# Patient Record
Sex: FEMALE | Race: WHITE | NOT HISPANIC OR LATINO | Employment: FULL TIME | URBAN - METROPOLITAN AREA
[De-identification: names, ages, dates, MRNs, and addresses within clinical notes are randomized per-mention and may not be internally consistent; named-entity substitution may affect disease eponyms.]

---

## 2017-01-17 ENCOUNTER — MYC MEDICAL ADVICE (OUTPATIENT)
Dept: FAMILY MEDICINE | Facility: CLINIC | Age: 52
End: 2017-01-17

## 2017-01-17 DIAGNOSIS — E78.5 HYPERLIPIDEMIA LDL GOAL <160: Primary | ICD-10-CM

## 2017-01-17 RX ORDER — SIMVASTATIN 10 MG
10 TABLET ORAL AT BEDTIME
Qty: 30 TABLET | Refills: 0 | Status: SHIPPED | OUTPATIENT
Start: 2017-01-17 | End: 2017-02-02

## 2017-01-17 NOTE — TELEPHONE ENCOUNTER
Alex sent. Huddled with provider who would like to see patient. Ordered simvastatin x1, and order for fasting lipids.    Arnav Warren RN

## 2017-02-02 ENCOUNTER — OFFICE VISIT (OUTPATIENT)
Dept: FAMILY MEDICINE | Facility: CLINIC | Age: 52
End: 2017-02-02
Payer: COMMERCIAL

## 2017-02-02 VITALS
WEIGHT: 159 LBS | SYSTOLIC BLOOD PRESSURE: 118 MMHG | HEART RATE: 88 BPM | HEIGHT: 69 IN | BODY MASS INDEX: 23.55 KG/M2 | TEMPERATURE: 97.9 F | DIASTOLIC BLOOD PRESSURE: 82 MMHG

## 2017-02-02 DIAGNOSIS — E78.5 HYPERLIPIDEMIA LDL GOAL <160: Primary | ICD-10-CM

## 2017-02-02 DIAGNOSIS — Z11.59 NEED FOR HEPATITIS C SCREENING TEST: ICD-10-CM

## 2017-02-02 LAB
CHOLEST SERPL-MCNC: 233 MG/DL
HDLC SERPL-MCNC: 70 MG/DL
LDLC SERPL CALC-MCNC: 140 MG/DL
NONHDLC SERPL-MCNC: 163 MG/DL
TRIGL SERPL-MCNC: 114 MG/DL

## 2017-02-02 PROCEDURE — 86803 HEPATITIS C AB TEST: CPT | Performed by: PHYSICIAN ASSISTANT

## 2017-02-02 PROCEDURE — 36415 COLL VENOUS BLD VENIPUNCTURE: CPT | Performed by: PHYSICIAN ASSISTANT

## 2017-02-02 PROCEDURE — 99213 OFFICE O/P EST LOW 20 MIN: CPT | Performed by: PHYSICIAN ASSISTANT

## 2017-02-02 PROCEDURE — 80061 LIPID PANEL: CPT | Performed by: PHYSICIAN ASSISTANT

## 2017-02-02 RX ORDER — SIMVASTATIN 10 MG
10 TABLET ORAL AT BEDTIME
Qty: 90 TABLET | Refills: 3 | Status: SHIPPED | OUTPATIENT
Start: 2017-02-02 | End: 2018-02-27

## 2017-02-02 NOTE — PROGRESS NOTES
SUBJECTIVE:                                                    Isela Cartwright is a 51 year old female who presents to clinic today for the following health issues:      Hyperlipidemia Follow-Up      Rate your low fat/cholesterol diet?: good    Taking statin?  Yes, no muscle aches from statin    Other lipid medications/supplements?:  none       Amount of exercise or physical activity: 2-3 days/week for an average of 45-60 minutes    Problems taking medications regularly: No    Medication side effects: none    Diet: low fat/cholesterol        Problem list and histories reviewed & adjusted, as indicated.  Additional history: as documented    Patient Active Problem List   Diagnosis     Insomnia     Recurrent UTI     Hyperlipidemia LDL goal <160     Vaginal atrophy     Weight gain     Past Surgical History   Procedure Laterality Date     Surgical history of -        urethral dilation from UTI     Colonoscopy         Social History   Substance Use Topics     Smoking status: Former Smoker -- 0.50 packs/day for 15 years     Types: Cigarettes     Quit date: 2008     Smokeless tobacco: Former User     Alcohol Use: Yes      Comment: OCCAS     Family History   Problem Relation Age of Onset     Hypertension Father      HEART DISEASE Maternal Grandfather      cardiomegaly;  at 98     C.A.D. No family hx of      no deaths at young age, bypass, etc.     Breast Cancer Maternal Aunt      mom's half sister     Cancer - colorectal Maternal Grandmother      Breast Cancer Cousin      paternal      DIABETES No family hx of      father is borderline     Hyperlipidemia Father      CEREBROVASCULAR DISEASE Maternal Grandmother      TIAs     Breast Cancer Cousin          Patient presents with a need for a medication refill of her hyperlipidemia medication. Patient reports no concerns regarding this medication, any other medications, or about life in general.       ROS:  C: NEGATIVE for fever, chills, change in weight  E/M:  "NEGATIVE for ear, mouth and throat problems  R: NEGATIVE for significant cough or SOB  CV: NEGATIVE for chest pain, palpitations or peripheral edema  MUSCULOSKELETAL: NEGATIVE for significant arthralgias or myalgia  ROS otherwise negative    OBJECTIVE:                                                    /82 mmHg  Pulse 88  Temp(Src) 97.9  F (36.6  C) (Oral)  Ht 5' 9\" (1.753 m)  Wt 159 lb (72.122 kg)  BMI 23.47 kg/m2  LMP 07/13/2007  Body mass index is 23.47 kg/(m^2).  GENERAL: healthy, alert and no distress  EYES: Eyes grossly normal to inspection, present red-eye reflex, PERRL and conjunctivae and sclerae normal  HENT: ear canals and TM's normal, mouth and oropharynx without ulcers, lesions, erythema, or inflammation  RESP: lungs clear to auscultation - no rales, rhonchi or wheezes  CV: regular rate and rhythm, normal S1 S2, no S3 or S4, no murmur, click or rub  MSK: 2+ deep tendon reflex at the knees bilaterally  SKIN: no suspicious lesions or rashes  PSYCH: mentation appears normal, affect normal/bright         ASSESSMENT/PLAN:                                                          (E78.5) Hyperlipidemia LDL goal <160  (primary encounter diagnosis)  Comment: Patient is tolerating low dose statin treatment for previous concerns regarding hyperlipidemia. We did briefly discuss pros / cons of treatment and current guidelines - she prefers to stay on.   Plan: Lipid panel reflex to direct LDL, simvastatin         (ZOCOR) 10 MG tablet           (Z11.59) Need for hepatitis C screening test  Comment: Per guidelines, patient's age indicates the need for a one time check for subclinical Hepatitis C infection  Plan: Hepatitis C Screen Reflex to HCV RNA Quant and         Genotype         HM up to date, pap, mammo, colon     PALMER MOODY PA-C  [facility]    "

## 2017-02-02 NOTE — NURSING NOTE
"Chief Complaint   Patient presents with     Lipids       Initial /82 mmHg  Pulse 88  Temp(Src) 97.9  F (36.6  C) (Oral)  Ht 5' 9\" (1.753 m)  Wt 159 lb (72.122 kg)  BMI 23.47 kg/m2  LMP 07/13/2007 Estimated body mass index is 23.47 kg/(m^2) as calculated from the following:    Height as of this encounter: 5' 9\" (1.753 m).    Weight as of this encounter: 159 lb (72.122 kg).  BP completed using cuff size: jonathan Gates, Certified Medical Assistant (AAMA)     "

## 2017-02-02 NOTE — MR AVS SNAPSHOT
"              After Visit Summary   2/2/2017    Isela Cartwright    MRN: [id number]           Patient Information     Date Of Birth          1965        Visit Information        Provider Department      2/2/2017 7:20 AM Bandar Banuelos PA-C [facility]        Today's Diagnoses     Hyperlipidemia LDL goal <160    -  1     Need for hepatitis C screening test            Follow-ups after your visit        Who to contact     If you have questions or need follow up information about today's clinic visit or your schedule please contact [facility] directly at [phone].  Normal or non-critical lab and imaging results will be communicated to you by NoveltyLabhart, letter or phone within 4 business days after the clinic has received the results. If you do not hear from us within 7 days, please contact the clinic through NoveltyLabhart or phone. If you have a critical or abnormal lab result, we will notify you by phone as soon as possible.  Submit refill requests through NetBeez or call your pharmacy and they will forward the refill request to us. Please allow 3 business days for your refill to be completed.          Additional Information About Your Visit        MyChart Information     NetBeez gives you secure access to your electronic health record. If you see a primary care provider, you can also send messages to your care team and make appointments. If you have questions, please call your primary care clinic.  If you do not have a primary care provider, please call [phone] and they will assist you.        Care EveryWhere ID     This is your Care EveryWhere ID. This could be used by other organizations to access your [place] medical records  [id number]        Your Vitals Were     Pulse Temperature Height BMI (Body Mass Index) Last Period       88 97.9  F (36.6  C) (Oral) 5' 9\" (1.753 m) 23.47 kg/m2 07/13/2007        Blood Pressure from Last 3 Encounters:   02/02/17 118/82   08/02/16 " 104/76   10/06/15 108/60    Weight from Last 3 Encounters:   02/02/17 159 lb (72.122 kg)   09/14/16 155 lb 6.4 oz (70.489 kg)   08/02/16 154 lb 3.2 oz (69.945 kg)              We Performed the Following     Hepatitis C Screen Reflex to HCV RNA Quant and Genotype     Lipid panel reflex to direct LDL          Where to get your medicines      These medications were sent to [place] [address] [facility] [place] - [address]  [address], [place] [address]     Phone:  [phone]    - simvastatin 10 MG tablet       Primary Care Provider Office Phone # Fax #    Bandar Banuelos PA-C [phone]       [facility] [address] [place] [address]        Thank you!     Thank you for choosing [facility]  for your care. Our goal is always to provide you with excellent care. Hearing back from our patients is one way we can continue to improve our services. Please take a few minutes to complete the written survey that you may receive in the mail after your visit with us. Thank you!             Your Updated Medication List - Protect others around you: Learn how to safely use, store and throw away your medicines at www.disposemymeds.org.          This list is accurate as of: 2/2/17  8:05 AM.  Always use your most recent med list.                   Brand Name Dispense Instructions for use    CALCIUM 500 + D PO      Take 1 tablet by mouth daily.       simvastatin 10 MG tablet    ZOCOR    90 tablet    Take 1 tablet (10 mg) by mouth At Bedtime       tretinoin 0.025 % cream    RETIN-A    45 g    Use every other night, pea-size to entire face x 2 weeks, then use nightly       zolpidem 5 MG tablet    AMBIEN    30 tablet    Take 1 tablet (5 mg) by mouth nightly as needed for sleep

## 2017-02-03 LAB — HCV AB SERPL QL IA: NORMAL

## 2017-03-15 ENCOUNTER — MYC MEDICAL ADVICE (OUTPATIENT)
Dept: FAMILY MEDICINE | Facility: CLINIC | Age: 52
End: 2017-03-15

## 2017-03-15 ENCOUNTER — E-VISIT (OUTPATIENT)
Dept: FAMILY MEDICINE | Facility: CLINIC | Age: 52
End: 2017-03-15
Payer: COMMERCIAL

## 2017-03-15 DIAGNOSIS — G47.00 INSOMNIA, UNSPECIFIED TYPE: ICD-10-CM

## 2017-03-15 PROCEDURE — 99444 ZZC PHYSICIAN ONLINE EVALUATION & MANAGEMENT SERVICE: CPT | Performed by: PHYSICIAN ASSISTANT

## 2017-03-15 NOTE — TELEPHONE ENCOUNTER
It looks like this was filled by Ger[facility] in 2015. Would you like to see patient for this, or refill?    Arnav Warren RN

## 2017-03-15 NOTE — MR AVS SNAPSHOT
After Visit Summary   3/15/2017    Isela Cartwright    MRN: [id number]           Patient Information     Date Of Birth          1965        Visit Information        Provider Department      3/15/2017 10:43 AM Bandar Banuelos PA-C [facility]        Today's Diagnoses     Insomnia, unspecified type           Follow-ups after your visit        Who to contact     If you have questions or need follow up information about today's clinic visit or your schedule please contact [facility] directly at [phone].  Normal or non-critical lab and imaging results will be communicated to you by BPThart, letter or phone within 4 business days after the clinic has received the results. If you do not hear from us within 7 days, please contact the clinic through Oscart or phone. If you have a critical or abnormal lab result, we will notify you by phone as soon as possible.  Submit refill requests through Employee Benef[employer] Plans or call your pharmacy and they will forward the refill request to us. Please allow 3 business days for your refill to be completed.          Additional Information About Your Visit        MyChart Information     [employer] Benef[employer] Plans gives you secure access to your electronic health record. If you see a primary care provider, you can also send messages to your care team and make appointments. If you have questions, please call your primary care clinic.  If you do not have a primary care provider, please call [phone] and they will assist you.        Care EveryWhere ID     This is your Care EveryWhere ID. This could be used by other organizations to access your [place] medical records  [id number]        Your Vitals Were     Last Period                   07/13/2007            Blood Pressure from Last 3 Encounters:   02/02/17 118/82   08/02/16 104/76   10/06/15 108/60    Weight from Last 3 Encounters:   02/02/17 159 lb (72.1 kg)   09/14/16 155 lb 6.4 oz (70.5 kg)   08/02/16  154 lb 3.2 oz (69.9 kg)              Today, you had the following     No orders found for display         Where to get your medicines      Some of these will need a paper prescription and others can be bought over the counter.  Ask your nurse if you have questions.     Bring a paper prescription for each of these medications     zolpidem 5 MG tablet          Primary Care Provider Office Phone # Fax #    Bandar Banuelos PA-C [phone]       [facility] [address] [facility] [address]        Thank you!     Thank you for choosing [facility]  for your care. Our goal is always to provide you with excellent care. Hearing back from our patients is one way we can continue to improve our services. Please take a few minutes to complete the written survey that you may receive in the mail after your visit with us. Thank you!             Your Updated Medication List - Protect others around you: Learn how to safely use, store and throw away your medicines at www.disposemymeds.org.          This list is accurate as of: 3/15/17 11:59 PM.  Always use your most recent med list.                   Brand Name Dispense Instructions for use    CALCIUM 500 + D PO      Take 1 tablet by mouth daily.       simvastatin 10 MG tablet    ZOCOR    90 tablet    Take 1 tablet (10 mg) by mouth At Bedtime       tretinoin 0.025 % cream    RETIN-A    45 g    Use every other night, pea-size to entire face x 2 weeks, then use nightly       zolpidem 5 MG tablet    AMBIEN    30 tablet    Take 1 tablet (5 mg) by mouth nightly as needed for sleep

## 2017-03-16 RX ORDER — ZOLPIDEM TARTRATE 5 MG/1
5 TABLET ORAL
Qty: 30 TABLET | Refills: 2 | Status: SHIPPED | OUTPATIENT
Start: 2017-03-16 | End: 2018-09-20

## 2017-08-03 ENCOUNTER — RADIANT APPOINTMENT (OUTPATIENT)
Dept: MAMMOGRAPHY | Facility: CLINIC | Age: 52
End: 2017-08-03

## 2017-08-03 DIAGNOSIS — Z12.31 VISIT FOR SCREENING MAMMOGRAM: ICD-10-CM

## 2017-10-12 ENCOUNTER — MYC MEDICAL ADVICE (OUTPATIENT)
Dept: FAMILY MEDICINE | Facility: CLINIC | Age: 52
End: 2017-10-12

## 2017-10-26 ENCOUNTER — OFFICE VISIT (OUTPATIENT)
Dept: FAMILY MEDICINE | Facility: CLINIC | Age: 52
End: 2017-10-26
Payer: COMMERCIAL

## 2017-10-26 VITALS
HEIGHT: 69 IN | BODY MASS INDEX: 21.77 KG/M2 | SYSTOLIC BLOOD PRESSURE: 108 MMHG | DIASTOLIC BLOOD PRESSURE: 72 MMHG | WEIGHT: 147 LBS | TEMPERATURE: 98.3 F | HEART RATE: 80 BPM

## 2017-10-26 DIAGNOSIS — M72.2 PLANTAR FASCIITIS: Primary | ICD-10-CM

## 2017-10-26 PROCEDURE — 99213 OFFICE O/P EST LOW 20 MIN: CPT | Performed by: PHYSICIAN ASSISTANT

## 2017-10-26 NOTE — NURSING NOTE
"Chief Complaint   Patient presents with     Foot Pain     Heel pain      Flu Shot     Pt declined        Initial /72 (BP Location: Right arm, Cuff Size: Adult Regular)  Pulse 80  Temp 98.3  F (36.8  C) (Oral)  Ht 5' 9\" (1.753 m)  Wt 147 lb (66.7 kg)  LMP 07/13/2007  BMI 21.71 kg/m2 Estimated body mass index is 21.71 kg/(m^2) as calculated from the following:    Height as of this encounter: 5' 9\" (1.753 m).    Weight as of this encounter: 147 lb (66.7 kg).  Medication Reconciliation: complete     Gerri Antunez CMA (AAMA)      "

## 2017-10-26 NOTE — PROGRESS NOTES
"  SUBJECTIVE:   Isela Cartwright is a 52 year old female who presents to clinic today for the following health issues:      Joint Pain    Onset: July     Description:   Location: Left heel   Character: Dull ache    Intensity: moderate    Progression of Symptoms: same    Accompanying Signs & Symptoms:  Other symptoms: none    History:   Previous similar pain: no       Precipitating factors:   Trauma or overuse: no     Alleviating factors:  Improved by: nothing    Therapies Tried and outcome: Nothing     Patient has had pain since July, she thinks it might be due to wearing sandals in the summer. Left heel pain. No right sided pain. No history of injuries or pain in the past. Pain will increase with the first few steps after resting. Has morning pain. Pain increases with walking a lot. Typically wears tennis shows. Works as an  - but does walk around quite a bit throughout the day. Denies numbness or tingling. She has not tried anything for the pain.   Does aerobics once a week - her pain doesn't seem to increase with this activity.    Problem list and histories reviewed & adjusted, as indicated.  Additional history: as documented    BP Readings from Last 3 Encounters:   10/26/17 108/72   02/02/17 118/82   08/02/16 104/76    Wt Readings from Last 3 Encounters:   10/26/17 147 lb (66.7 kg)   02/02/17 159 lb (72.1 kg)   09/14/16 155 lb 6.4 oz (70.5 kg)         Reviewed and updated as needed this visit by clinical staff     Reviewed and updated as needed this visit by Provider       ROS:  Constitutional, HEENT, cardiovascular, pulmonary, gi and gu systems are negative, except as otherwise noted.      OBJECTIVE:   /72 (BP Location: Right arm, Cuff Size: Adult Regular)  Pulse 80  Temp 98.3  F (36.8  C) (Oral)  Ht 5' 9\" (1.753 m)  Wt 147 lb (66.7 kg)  LMP 07/13/2007  BMI 21.71 kg/m2  Body mass index is 21.71 kg/(m^2).  GENERAL: healthy, alert and no distress  MS: left plantar aspect of calcaneus " tender to palpation, ankle plantarflexion and dorsiflexion ROM WNL, strength 5/5.       ASSESSMENT/PLAN:     1. Plantar fasciitis  Patient advised to do stretching exercises every evening prior to going to bed. Also to use a cold water bottle to roll out and ice her foot at night. In addition, advised to get orthotics for extra support. Discussed the course of plantar fasciitis. Instructed to follow up in 2-3 months if not improving or worsening. May benefit from physical therapy at that time.    PALMER MOODY PA-C  [facility]

## 2017-10-26 NOTE — MR AVS SNAPSHOT
"              After Visit Summary   10/26/2017    Isela Cartwright    MRN: [id number]           Patient Information     Date Of Birth          1965        Visit Information        Provider Department      10/26/2017 8:40 AM Bandar Banuelos PA-C [facility]        Today's Diagnoses     Plantar fasciitis    -  1       Follow-ups after your visit        Who to contact     If you have questions or need follow up information about today's clinic visit or your schedule please contact [facility] directly at [phone].  Normal or non-critical lab and imaging results will be communicated to you by Cloud Lendinghart, letter or phone within 4 business days after the clinic has received the results. If you do not hear from us within 7 days, please contact the clinic through Heart Metabolicst or phone. If you have a critical or abnormal lab result, we will notify you by phone as soon as possible.  Submit refill requests through MySQL or call your pharmacy and they will forward the refill request to us. Please allow 3 business days for your refill to be completed.          Additional Information About Your Visit        MyChart Information     MySQL gives you secure access to your electronic health record. If you see a primary care provider, you can also send messages to your care team and make appointments. If you have questions, please call your primary care clinic.  If you do not have a primary care provider, please call [phone] and they will assist you.        Care EveryWhere ID     This is your Care EveryWhere ID. This could be used by other organizations to access your [place] medical records  [id number]        Your Vitals Were     Pulse Temperature Height Last Period BMI (Body Mass Index)       80 98.3  F (36.8  C) (Oral) 5' 9\" (1.753 m) 07/13/2007 21.71 kg/m2        Blood Pressure from Last 3 Encounters:   10/26/17 108/72   02/02/17 118/82   08/02/16 104/76    Weight from Last 3 " Encounters:   10/26/17 147 lb (66.7 kg)   02/02/17 159 lb (72.1 kg)   09/14/16 155 lb 6.4 oz (70.5 kg)              Today, you had the following     No orders found for display       Primary Care Provider Office Phone # Fax #    Bandar Banuelos PA-C [phone]       [address] [facility] [address]        Equal Access to Services     LINDA BELL : Hadii aad ku hadasho Soomaali, waaxda luqadaha, qaybta kaalmada adeegyada, waxay idiin hayaan adeeg kharatammy la'ani . So [facility] [phone].    ATENCIÓN: Si lornala espcarlos enrique, tiene a chamorro disposición servicios gratuitos de asistencia lingüística. [facility] [phone].    We comply with applicable federal civil rights laws and [place] laws. We do not discriminate on the basis of race, color, national origin, age, disability, sex, sexual orientation, or gender identity.            Thank you!     Thank you for choosing [facility]  for your care. Our goal is always to provide you with excellent care. Hearing back from our patients is one way we can continue to improve our services. Please take a few minutes to complete the written survey that you may receive in the mail after your visit with us. Thank you!             Your Updated Medication List - Protect others around you: Learn how to safely use, store and throw away your medicines at www.disposemymeds.org.          This list is accurate as of: 10/26/17 10:33 AM.  Always use your most recent med list.                   Brand Name Dispense Instructions for use Diagnosis    CALCIUM 500 + D PO      Take 1 tablet by mouth daily.        simvastatin 10 MG tablet    ZOCOR    90 tablet    Take 1 tablet (10 mg) by mouth At Bedtime    Hyperlipidemia LDL goal <160       tretinoin 0.025 % cream    RETIN-A    45 g    Use every other night, pea-size to entire face x 2 weeks, then use nightly    Acne vulgaris       zolpidem 5 MG tablet    AMBIEN    30 tablet    Take 1 tablet (5 mg) by mouth  nightly as needed for sleep    Insomnia, unspecified type

## 2018-01-24 ENCOUNTER — TELEPHONE (OUTPATIENT)
Dept: FAMILY MEDICINE | Facility: CLINIC | Age: 53
End: 2018-01-24

## 2018-01-24 NOTE — TELEPHONE ENCOUNTER
Reason for Call:  Other     Detailed comments: patent would like to know what her BP and cholesterol where the last time it was checked, please call with information     Phone Number Patient can be reached at: Work number on file:  [phone] (work)    Best Time: any    Can we leave a detailed message on this number? YES    Call taken on 1/24/2018 at 8:17 AM by Yulisa Sarah

## 2018-01-24 NOTE — TELEPHONE ENCOUNTER
Returned call to patient and informed her of BP reading from visit on 10/26/17, as well as most recent Lipid Panel results from 2/2/17.  She voices understanding.  Edie Mckeon RN

## 2018-02-12 ENCOUNTER — DOCUMENTATION ONLY (OUTPATIENT)
Dept: LAB | Facility: CLINIC | Age: 53
End: 2018-02-12

## 2018-02-12 DIAGNOSIS — Z13.1 SCREENING FOR DIABETES MELLITUS: ICD-10-CM

## 2018-02-12 DIAGNOSIS — Z13.220 SCREENING CHOLESTEROL LEVEL: Primary | ICD-10-CM

## 2018-02-22 DIAGNOSIS — Z13.1 SCREENING FOR DIABETES MELLITUS: ICD-10-CM

## 2018-02-22 DIAGNOSIS — Z13.220 SCREENING CHOLESTEROL LEVEL: ICD-10-CM

## 2018-02-22 LAB
ANION GAP SERPL CALCULATED.3IONS-SCNC: 7 MMOL/L (ref 3–14)
BUN SERPL-MCNC: 12 MG/DL (ref 7–30)
CALCIUM SERPL-MCNC: 9.2 MG/DL (ref 8.5–10.1)
CHLORIDE SERPL-SCNC: 107 MMOL/L (ref 94–109)
CHOLEST SERPL-MCNC: 242 MG/DL
CO2 SERPL-SCNC: 27 MMOL/L (ref 20–32)
CREAT SERPL-MCNC: 0.69 MG/DL (ref 0.52–1.04)
GFR SERPL CREATININE-BSD FRML MDRD: 88 ML/MIN/1.7M2
GLUCOSE SERPL-MCNC: 88 MG/DL (ref 70–99)
HDLC SERPL-MCNC: 70 MG/DL
LDLC SERPL CALC-MCNC: 153 MG/DL
NONHDLC SERPL-MCNC: 172 MG/DL
POTASSIUM SERPL-SCNC: 4.3 MMOL/L (ref 3.4–5.3)
SODIUM SERPL-SCNC: 141 MMOL/L (ref 133–144)
TRIGL SERPL-MCNC: 94 MG/DL

## 2018-02-22 PROCEDURE — 36415 COLL VENOUS BLD VENIPUNCTURE: CPT | Performed by: PHYSICIAN ASSISTANT

## 2018-02-22 PROCEDURE — 80048 BASIC METABOLIC PNL TOTAL CA: CPT | Performed by: PHYSICIAN ASSISTANT

## 2018-02-22 PROCEDURE — 80061 LIPID PANEL: CPT | Performed by: PHYSICIAN ASSISTANT

## 2018-02-27 ENCOUNTER — MYC MEDICAL ADVICE (OUTPATIENT)
Dept: FAMILY MEDICINE | Facility: CLINIC | Age: 53
End: 2018-02-27

## 2018-02-27 DIAGNOSIS — E78.5 HYPERLIPIDEMIA LDL GOAL <130: Primary | ICD-10-CM

## 2018-02-27 RX ORDER — SIMVASTATIN 20 MG
20 TABLET ORAL AT BEDTIME
Qty: 90 TABLET | Refills: 3 | Status: SHIPPED | OUTPATIENT
Start: 2018-02-27 | End: 2019-02-15

## 2018-04-03 ENCOUNTER — TELEPHONE (OUTPATIENT)
Dept: DERMATOLOGY | Facility: CLINIC | Age: 53
End: 2018-04-03

## 2018-04-03 ENCOUNTER — OFFICE VISIT (OUTPATIENT)
Dept: DERMATOLOGY | Facility: CLINIC | Age: 53
End: 2018-04-03
Payer: COMMERCIAL

## 2018-04-03 DIAGNOSIS — L82.0 SEBORRHEIC KERATOSES, INFLAMED: ICD-10-CM

## 2018-04-03 DIAGNOSIS — L70.0 ACNE VULGARIS: ICD-10-CM

## 2018-04-03 DIAGNOSIS — Z80.8 FAMILY HISTORY OF MELANOMA: Primary | ICD-10-CM

## 2018-04-03 RX ORDER — TRETINOIN 0.25 MG/G
CREAM TOPICAL
Qty: 45 G | Refills: 3 | Status: SHIPPED | OUTPATIENT
Start: 2018-04-03 | End: 2020-06-10

## 2018-04-03 ASSESSMENT — PAIN SCALES - GENERAL: PAINLEVEL: NO PAIN (0)

## 2018-04-03 NOTE — LETTER
4/3/2018       RE: Isela Cartwright  [address]  [facility] [address]     Dear Colleague,    Thank you for referring your patient, Isela Cartwright, to the [facility] DERMATOLOGY at [facility]. Please see a copy of my visit note below.    [facility] Dermatology Note      Dermatology Problem List:  1.Acne Vulgaris - face - tretinoin 0.025% cream nightly  2. Fhx melanoma and NMSC (mother)    CC:   Chief Complaint   Patient presents with     Skin Check     Isela is here today to have a mole looked at.          Encounter Date: Apr 3, 2018    History of Present Illness:  Ms. Isela Cartwright is a 52 year old female who presents for a spot check. She has had the spot on her left side for a long time, but lately it has changed somewhat and sometimes rubs on her bra line. It has gotten more raised she says. She does have a fhx of melanoma (mom) and her last FBSE was in September 2016 with Dr. Chavez.     Additionally, she would like a refill of her tretinoin for acne. It is working well. She uses it almost nightly. She says it helps the most on her cheeks. She still sometimes gets more acne on her chin, but it has not been bothersome enough to her at this time to change her treatment.     Past Medical History:   Patient Active Problem List   Diagnosis     Insomnia     Recurrent UTI     Hyperlipidemia LDL goal <160     Vaginal atrophy     Weight gain     Past Medical History:   Diagnosis Date     Recurrent UTI 11/19/2009     Tobacco use disorder     quit in 2008     Past Surgical History:   Procedure Laterality Date     COLONOSCOPY  2013     GENITOURINARY SURGERY  1988    burned uretha, chronic urinary tract infections before     SURGICAL HISTORY OF -       urethral dilation from UTI       Social History:  Not obtained today.    Family History:  There is a family history of melanoma and NMSC in the patient's mother.    Medications:  Current Outpatient Prescriptions    Medication Sig Dispense Refill     simvastatin (ZOCOR) 20 MG tablet Take 1 tablet (20 mg) by mouth At Bedtime 90 tablet 3     zolpidem (AMBIEN) 5 MG tablet Take 1 tablet (5 mg) by mouth nightly as needed for sleep 30 tablet 2     tretinoin (RETIN-A) 0.025 % cream Use every other night, pea-size to entire face x 2 weeks, then use nightly 45 g 3     Calcium Carbonate-Vitamin D (CALCIUM 500 + D PO) Take 1 tablet by mouth daily.       Allergies   Allergen Reactions     Amoxicillin Rash     Bees      Sulfa Drugs Rash     Tetracycline      Mild itching; not sure if this is true allergy         Review of Systems:  -Skin Establ Pt: The patient denies any new rash, pruritus, or lesions that are symptomatic, changing or bleeding, except as per HPI.  -Constitutional: The patient denies fatigue, fevers, chills, unintended weight loss, and night sweats.  -Skin: As above in HPI. No additional skin concerns.    Physical exam:  Vitals: LMP 07/13/2007  GEN: This is a well developed, well-nourished female in no acute distress, in a pleasant mood.    SKIN: Focused examination of the face, neck, abdomen and back was performed.  -There is a waxy stuck on tan to brown papule on the left flank which is inflamed.  -Face is relatively clear of comedones, although there are 2-3 small pink papules on the chin  -No other lesions of concern on areas examined.     Impression/Plan:  1. Seborrheic keratosis, irritated    Discussed benign nature of lesions.  Cryotherapy procedure note: After verbal consent and discussion of risks and benefits including but no limited to dyspigmentation/scar, blister, and pain, 1 was(were) treated with 1-2mm freeze border for 2 cycles with liquid nitrogen. Post cryotherapy instructions were provided.     2. Acne vulgaris    Tretinoin 0.025% cream refilled    Reminded patient of need for adequate sun protection - at least SPF 30    3. Family Hx of melanoma - mother    Discussed need for annual skin exams, patient's  last exam was 9/2016.     Patient to schedule for this fall.    Continue with adequate sun protection    CC Dr. Mitchell on close of this encounter.  Follow-up prn for new or changing lesions.       Staff Involved:  Staff Only  All risks, benefits and alternatives were discussed with patient.  Patient is in agreement and understands the assessment and plan.  All questions were answered.    Cora Wong PA-C  [facility]: Phone: [phone], Fax: [phone]

## 2018-04-03 NOTE — MR AVS SNAPSHOT
After Visit Summary   4/3/2018    Isela Cartwright    MRN: [id number]           Patient Information     Date Of Birth          1965        Visit Information        Provider Department      4/3/2018 10:30 AM Cora Wong PA-C [facility] Dermatology        Today's Diagnoses     Acne vulgaris          Care Instructions    Cryotherapy    What is it?    Use of a very cold liquid, such as liquid nitrogen, to freeze and destroy abnormal skin cells that need to be removed    What should I expect?    Tenderness and redness    A small blister that might grow and fill with dark purple blood. There may be crusting.    More than one treatment may be needed if the lesions do not go away.    How do I care for the treated area?    Gently wash the area with your hands when bathing.    Use a thin layer of Vaseline to help with healing. You may use a Band-Aid.     The area should heal within 7-10 days and may leave behind a pink or lighter color.     Do not use an antibiotic or Neosporin ointment.     You may take acetaminophen (Tylenol) for pain.     Call your Doctor if you have:    Severe pain    Signs of infection (warmth, redness, cloudy yellow drainage, and or a bad smell)    Questions or concerns    Who should I call with questions?       [facility]: [phone]       [facility]: [phone]       For urgent needs outside of business hours call the [facility] at [phone]        and ask for the dermatology resident on call            Follow-ups after your visit        Follow-up notes from your care team     Return in about 1 year (around 4/3/2019).      Who to contact     Please call your clinic at [phone] to:    Ask questions about your health    Make or cancel appointments    Discuss your medicines    Learn about your test results    Speak to your doctor            Additional Information About Your Visit        Alex  Information     [employer] gives you secure access to your electronic health record. If you see a primary care provider, you can also send messages to your care team and make appointments. If you have questions, please call your primary care clinic.  If you do not have a primary care provider, please call [phone] and they will assist you.      [employer] is an electronic gateway that provides easy, online access to your medical records. With [employer], you can request a clinic appointment, read your test results, renew a prescription or communicate with your care team.     To access your existing account, please contact your [facility] or call [phone] for assistance.        Care EveryWhere ID     This is your Care EveryWhere ID. This could be used by other organizations to access your [place] medical records  [id number]        Your Vitals Were     Last Period                   07/13/2007            Blood Pressure from Last 3 Encounters:   10/26/17 108/72   02/02/17 118/82   08/02/16 104/76    Weight from Last 3 Encounters:   10/26/17 66.7 kg (147 lb)   02/02/17 72.1 kg (159 lb)   09/14/16 70.5 kg (155 lb 6.4 oz)              Today, you had the following     No orders found for display         Today's Medication Changes          These changes are accurate as of 4/3/18 10:46 AM.  If you have any questions, ask your nurse or doctor.               These medicines have changed or have updated prescriptions.        Dose/Directions    tretinoin 0.025 % cream   Commonly known as:  RETIN-A   This may have changed:  additional instructions   Used for:  Acne vulgaris   Changed by:  Cora Wong PA-C        Use nightly on entire face   Quantity:  45 g   Refills:  3            Where to get your medicines      These medications were sent to [place] [address] [place] [facility] [place] - [address]  [address] [place] [address]     Phone:  [phone]     tretinoin 0.025 % cream                 Primary Care Provider Office Phone # Fax #    Bandar Banuelos PA-C [phone]       [address] [facility] [address]        Equal Access to Services     LINDA BELL : Hadii aad ku haddionicioo Soomaali, waaxda luqadaha, qaybta kaalmada adeegyada, vahid radford laTaraani hanson. So [facility] [phone].    ATENCIÓN: Si habla español, tiene a chamorro disposición servicios gratuitos de asistencia lingüística. Llame al [phone].    We comply with applicable federal civil rights laws and [place] laws. We do not discriminate on the basis of race, color, national origin, age, disability, sex, sexual orientation, or gender identity.            Thank you!     Thank you for choosing [facility] DERMATOLOGY  for your care. Our goal is always to provide you with excellent care. Hearing back from our patients is one way we can continue to improve our services. Please take a few minutes to complete the written survey that you may receive in the mail after your visit with us. Thank you!             Your Updated Medication List - Protect others around you: Learn how to safely use, store and throw away your medicines at www.disposemymeds.org.          This list is accurate as of 4/3/18 10:46 AM.  Always use your most recent med list.                   Brand Name Dispense Instructions for use Diagnosis    CALCIUM 500 + D PO      Take 1 tablet by mouth daily.        simvastatin 20 MG tablet    ZOCOR    90 tablet    Take 1 tablet (20 mg) by mouth At Bedtime    Hyperlipidemia LDL goal <130       tretinoin 0.025 % cream    RETIN-A    45 g    Use nightly on entire face    Acne vulgaris       zolpidem 5 MG tablet    AMBIEN    30 tablet    Take 1 tablet (5 mg) by mouth nightly as needed for sleep    Insomnia, unspecified type

## 2018-04-03 NOTE — PATIENT INSTRUCTIONS
Cryotherapy    What is it?    Use of a very cold liquid, such as liquid nitrogen, to freeze and destroy abnormal skin cells that need to be removed    What should I expect?    Tenderness and redness    A small blister that might grow and fill with dark purple blood. There may be crusting.    More than one treatment may be needed if the lesions do not go away.    How do I care for the treated area?    Gently wash the area with your hands when bathing.    Use a thin layer of Vaseline to help with healing. You may use a Band-Aid.     The area should heal within 7-10 days and may leave behind a pink or lighter color.     Do not use an antibiotic or Neosporin ointment.     You may take acetaminophen (Tylenol) for pain.     Call your Doctor if you have:    Severe pain    Signs of infection (warmth, redness, cloudy yellow drainage, and or a bad smell)    Questions or concerns    Who should I call with questions?       [facility]: [phone]       [facility]: [phone]       For urgent needs outside of business hours call the [facility] at [phone]        and ask for the dermatology resident on call

## 2018-04-03 NOTE — PROGRESS NOTES
[facility] Dermatology Note      Dermatology Problem List:  1.Acne Vulgaris - face - tretinoin 0.025% cream nightly  2. Fhx melanoma and NMSC (mother)    CC:   Chief Complaint   Patient presents with     Skin Check     Isela is here today to have a mole looked at.          Encounter Date: Apr 3, 2018    History of Present Illness:  Ms. Isela Cartwright is a 52 year old female who presents for a spot check. She has had the spot on her left side for a long time, but lately it has changed somewhat and sometimes rubs on her bra line. It has gotten more raised she says. She does have a fhx of melanoma (mom) and her last FBSE was in September 2016 with Dr. Chavez.     Additionally, she would like a refill of her tretinoin for acne. It is working well. She uses it almost nightly. She says it helps the most on her cheeks. She still sometimes gets more acne on her chin, but it has not been bothersome enough to her at this time to change her treatment.     Past Medical History:   Patient Active Problem List   Diagnosis     Insomnia     Recurrent UTI     Hyperlipidemia LDL goal <160     Vaginal atrophy     Weight gain     Past Medical History:   Diagnosis Date     Recurrent UTI 11/19/2009     Tobacco use disorder     quit in 2008     Past Surgical History:   Procedure Laterality Date     COLONOSCOPY  2013     GENITOURINARY SURGERY  1988    burned uretha, chronic urinary tract infections before     SURGICAL HISTORY OF -       urethral dilation from UTI       Social History:  Not obtained today.    Family History:  There is a family history of melanoma and NMSC in the patient's mother.    Medications:  Current Outpatient Prescriptions   Medication Sig Dispense Refill     simvastatin (ZOCOR) 20 MG tablet Take 1 tablet (20 mg) by mouth At Bedtime 90 tablet 3     zolpidem (AMBIEN) 5 MG tablet Take 1 tablet (5 mg) by mouth nightly as needed for sleep 30 tablet 2     tretinoin (RETIN-A) 0.025 % cream Use every other  night, pea-size to entire face x 2 weeks, then use nightly 45 g 3     Calcium Carbonate-Vitamin D (CALCIUM 500 + D PO) Take 1 tablet by mouth daily.       Allergies   Allergen Reactions     Amoxicillin Rash     Bees      Sulfa Drugs Rash     Tetracycline      Mild itching; not sure if this is true allergy         Review of Systems:  -Skin Establ Pt: The patient denies any new rash, pruritus, or lesions that are symptomatic, changing or bleeding, except as per HPI.  -Constitutional: The patient denies fatigue, fevers, chills, unintended weight loss, and night sweats.  -Skin: As above in HPI. No additional skin concerns.    Physical exam:  Vitals: LMP 07/13/2007  GEN: This is a well developed, well-nourished female in no acute distress, in a pleasant mood.    SKIN: Focused examination of the face, neck, abdomen and back was performed.  -There is a waxy stuck on tan to brown papule on the left flank which is inflamed.  -Face is relatively clear of comedones, although there are 2-3 small pink papules on the chin  -No other lesions of concern on areas examined.     Impression/Plan:  1. Seborrheic keratosis, irritated    Discussed benign nature of lesions.  Cryotherapy procedure note: After verbal consent and discussion of risks and benefits including but no limited to dyspigmentation/scar, blister, and pain, 1 was(were) treated with 1-2mm freeze border for 2 cycles with liquid nitrogen. Post cryotherapy instructions were provided.     2. Acne vulgaris    Tretinoin 0.025% cream refilled    Reminded patient of need for adequate sun protection - at least SPF 30    3. Family Hx of melanoma - mother    Discussed need for annual skin exams, patient's last exam was 9/2016.     Patient to schedule for this fall.    Continue with adequate sun protection    CC Dr. Mitchell on close of this encounter.  Follow-up prn for new or changing lesions.       Staff Involved:  Staff Only  All risks, benefits and alternatives were discussed  with patient.  Patient is in agreement and understands the assessment and plan.  All questions were answered.    Cora Wong PA-C  [facility]: Phone: [phone], Fax: [phone]

## 2018-04-04 NOTE — TELEPHONE ENCOUNTER
Prior Authorization Retail Medication Request    Medication/Dose: tretinoin (Retin-A) 0.025% cream  ICD code (if different than what is on RX):  Acne vulgaris (L70.0)  Previously Tried and Failed:  tretinoin  Rationale:  Continuation of current therapy    Insurance Name:  [employer]  Insurance ID:  [id number]

## 2018-04-05 NOTE — TELEPHONE ENCOUNTER
Central Prior Authorization Team   Phone: [phone]      PA Initiation    Medication: tretinoin (Retin-A) 0.025% cream-Initiated  Insurance Company: Other (see comments)Comment:  [phone]  Pharmacy Filling the Rx: [id number] [place] [facility] [place] - [address]  Filling Pharmacy Phone: [phone]  Filling Pharmacy Fax:    Start Date: 4/5/2018

## 2018-04-06 NOTE — TELEPHONE ENCOUNTER
Prior Authorization Approval    Authorization Effective Date: 4/3/2018  Authorization Expiration Date: 4/3/2019  Medication: tretinoin (Retin-A) 0.025% cream-PA approved  Approved Dose/Quantity:    Reference #:     Insurance Company: Other (see comments)Comment:  [employer] [phone]  Expected CoPay: $10.00     CoPay Card Available:      Foundation Assistance Needed:    Which Pharm[facility] is filling the prescription (Not needed for infusion/clinic administered[id number] [place] [address] [address]  Pharmacy Notified: Yes  Patient Notified: Yes

## 2018-05-08 ENCOUNTER — OFFICE VISIT (OUTPATIENT)
Dept: FAMILY MEDICINE | Facility: CLINIC | Age: 53
End: 2018-05-08
Payer: COMMERCIAL

## 2018-05-08 ENCOUNTER — MYC MEDICAL ADVICE (OUTPATIENT)
Dept: FAMILY MEDICINE | Facility: CLINIC | Age: 53
End: 2018-05-08

## 2018-05-08 ENCOUNTER — RADIANT APPOINTMENT (OUTPATIENT)
Dept: GENERAL RADIOLOGY | Facility: CLINIC | Age: 53
End: 2018-05-08
Attending: FAMILY MEDICINE
Payer: COMMERCIAL

## 2018-05-08 VITALS
TEMPERATURE: 98.3 F | HEIGHT: 69 IN | WEIGHT: 154 LBS | HEART RATE: 82 BPM | BODY MASS INDEX: 22.81 KG/M2 | DIASTOLIC BLOOD PRESSURE: 76 MMHG | SYSTOLIC BLOOD PRESSURE: 130 MMHG

## 2018-05-08 DIAGNOSIS — M79.645 PAIN OF FINGER OF LEFT HAND: Primary | ICD-10-CM

## 2018-05-08 PROCEDURE — 73130 X-RAY EXAM OF HAND: CPT | Mod: LT

## 2018-05-08 PROCEDURE — 99213 OFFICE O/P EST LOW 20 MIN: CPT | Performed by: FAMILY MEDICINE

## 2018-05-08 NOTE — PATIENT INSTRUCTIONS
-Ice 20 minutes 3 times daily, as needed.  -OTC medications, only as directed.  -Follow-up if worsening condition or not gradually improving over the next week.    [facility]   Discharged by : Angi Chavez CMA  Paper scripts provided to patient : no     If you have any questions regarding your visit please contact your care team:     Team Gold                Clinic Hours Telephone Number     Dr. Jeri Cardona NP 7am-7pm  Monday - Thursday   7am-5pm  Fridays  [phone]   (Appointment scheduling available 24/7)     RN Line  [phone] option 2     Urgent Care - [place] - 11am-9pm Monday-Friday Saturday-Sunday- 9am-5pm     [place] -   5pm-9pm Monday-Friday Saturday-Sunday- 9am-5pm    [phone] - [place]    [phone] - [place]       For a Price Quote for your services, please call our Consumer Price Line at [phone].     What options do I have for visits at the clinic other than the traditional office visit?     To expand how we care for you, many of our providers are utilizing electronic visits (e-visits) and telephone visits, when medically appropriate, for interactions with their patients rather than a visit in the clinic. We also offer nurse visits for many medical concerns. Just like any other service, we will bill your insurance company for this type of visit based on time spent on the phone with your provider. Not all insurance companies cover these visits. Please check with your medical insurance if this type of visit is covered. You will be responsible for any charges that are not paid by your insurance.   E-visits via [employer]: generally incur a $35.00 fee.     Telephone visits:  Time spent on the phone: *charged based on time that is spent on the phone in increments of 10 minutes. Estimated cost:   5-10 mins $30.00   11-20 mins. $59.00   21-30  mins. $85.00       Use [employer] (secure email communication and access to your chart) to send your primary care provider a message or make an appointment. Ask someone on your Team how to sign up for [employer].     As always, Thank you for trusting us with your health care needs!      [facility]:    Scheduling Appointments  [facility]  Call: [phone]    [facility]  Call: [phone]    [facility]  Call: [phone]    For Gastroenterology referrals   [facility]   Clinics and Surgery Center, 4th Floor   [address] [place] [address]   Appointments: [phone]    WHERE TO GO FOR CARE?  Clinic    Make an appointment if you:       Are sick (cold, cough, flu, sore throat, earache or in pain).       Have a small injury (sprain, small cut, burn or broken bone).       Need a physical exam, Pap smear, vaccine or prescription refill.       Have questions about your health or medicines.    To reach us:      Call [phone] ([phone]). Open 24 hours every day. (For counseling services, call [phone].)    Log into [employer] at [email or web address]. (Visit [email or web address] to create an account.) Hospital emergency room    An emergency is a serious or life- threatening problem that must be treated right away.    Call 508 or get to the hospital if you have:      Very bad or sudden:            - Chest pain or pressure         - Bleeding         - Head or belly pain         - Dizziness or trouble seeing, walking or                          Speaking      Problems breathing      Blood in your vomit or you are coughing up blood      A major injury (knocked out, loss of a finger or limb, rape, broken bone protruding from skin)    A mental health crisis. (Or call the Mental Health Crisis line at 1-737.284.2600 or Suicide Prevention Hotline at 1-804.941.1455.)    Open 24 hours every day. You don't  need an appointment.     Urgent care    Visit urgent care for sickness or small injuries when the clinic is closed. You don't need an appointment. To check hours or find an urgent care near you, visit [email or web address]. Online care    Get online care from [facility] for more than 70 common problems, like colds, allergies and infections. Open 24 hours every day at:   [email or web address]   Need help deciding?    For advice about where to be seen, you may call your clinic and ask to speak with a nurse. We're here for you 24 hours every day.         If you are deaf or hard of hearing, please let us know. We provide many free services including sign language interpreters, oral interpreters, TTYs, telephone amplifiers, note takers and written materials.

## 2018-05-08 NOTE — PROGRESS NOTES
SUBJECTIVE:   Isela Cartwright is a 52 year old female who presents to clinic today for the following health issues:    Chief Complaint   Patient presents with     Musculoskeletal Problem     left fingers injury happened last night      Finger Pain    Onset:     Occurred last evening.      Mechanism:  Patient states she missed the last step of her deck.  Patient fell, catching herself with the fingers of her left hand.    No other injuries reported.  No headache or loss of consciousness.    Description:   Location: PIP joints of the left index, middle, and ring fingers  Character: Dull ache, especially involving the middle finger     Intensity: Moderate - 3/10 (rest) and 5-6/10 (with typing)    Progression of Symptoms: Same    Accompanying Signs & Symptoms:  Other symptoms: Swelling and purplish discoloration of the affected PIP joints noted, mainly involving the middle finger.  No paresthesias or weakness.    History:   Previous similar pain: no       Precipitating factors:   Trauma or overuse: YES - Typing at work makes it worse.    Alleviating factors:  Improved by: nothing    Therapies Tried and outcome: Ice last night, Ibuprofen     Problem list and histories reviewed & adjusted, as indicated.  Additional history: as documented    Patient Active Problem List   Diagnosis     Insomnia     Recurrent UTI     Hyperlipidemia LDL goal <160     Vaginal atrophy     Weight gain     Pain of finger of left hand     Past Surgical History:   Procedure Laterality Date     COLONOSCOPY  2013     GENITOURINARY SURGERY  1988    burned uretha, chronic urinary tract infections before     SURGICAL HISTORY OF -       urethral dilation from UTI       Social History   Substance Use Topics     Smoking status: Former Smoker     Packs/day: 0.50     Years: 15.00     Types: Cigarettes     Quit date: 1/6/2008     Smokeless tobacco: Former User     Alcohol use Yes      Comment: OCCAS     Family History   Problem Relation Age of Onset      "Hypertension Father      Hyperlipidemia Father      Cancer - colorectal Maternal Grandmother      CEREBROVASCULAR DISEASE Maternal Grandmother      TIAs     HEART DISEASE Maternal Grandfather      cardiomegaly;  at 98     Breast Cancer Maternal Aunt      mom's half sister     Breast Cancer Cousin      paternal      Breast Cancer Cousin      C.A.D. No family hx of      no deaths at young age, bypass, etc.     DIABETES No family hx of      father is borderline           Reviewed and updated as needed this visit by clinical staff  Tobacco  Allergies  Meds  Med Hx  Surg Hx  Fam Hx  Soc Hx      Reviewed and updated as needed this visit by Provider         ROS:  Patient denies risk for pregnancy, stating that she has been in menopause the past 15 years.    OBJECTIVE:     /76  Pulse 82  Temp 98.3  F (36.8  C) (Oral)  Ht 5' 9\" (1.753 m)  Wt 154 lb (69.9 kg)  LMP 2007  Breastfeeding? No  BMI 22.74 kg/m2  Body mass index is 22.74 kg/(m^2).    General: Awake, alert, and in no acute distress.  HEENT: Sclera anicteric.  No conjunctivitis.  Respiratory:  No respiratory distress.   Left Hand: No gross abnormalities.  Full range of motion and intact strength noted.  There is mild edema, ecchymosis, and tenderness noted involving the PIP joints of the index, middle, and ring fingers.  The PIP joint of the middle finger is most affected.  The remainder of the fingers and left hand are nontender to palpation.  Sensation intact.  Capillary refill less than 3 seconds.    X-ray of the Left Hand: Negative for acute changes or fracture, as reviewed by this examiner.  Formal Radiology report was reviewed in Epic.    ASSESSMENT/PLAN:     1. Pain of fingers of the left hand, likely consistent with contusions of the PIP joints of the index, middle, and ring fingers.  X-ray studies today were negative for fracture or dislocation.    - XR Hand Left G/E 3 Views    -Ice 20 minutes 3 times daily, as needed.  -OTC " medications, only as directed.  -Follow-up if worsening condition or not gradually improving over the next week.    Leesa Aguirre MD  [facility]

## 2018-05-08 NOTE — MR AVS SNAPSHOT
After Visit Summary   5/8/2018    Isela Cartwright    MRN: [id number]           Patient Information     Date Of Birth          1965        Visit Information        Provider Department      5/8/2018 1:40 PM Leesa Aguirre MD [facility] [place] [facility]        Today's Diagnoses     Pain of finger of left hand    -  1      Care Instructions    [facility]   Discharged by : Angi Chavez CMA  Paper scripts provided to patient : no     If you have any questions regarding your visit please contact your care team:     Team Gold                Clinic Hours Telephone Number     Dr. Jeri Cardona NP 7am-7pm  Monday - Thursday   7am-5pm  Fridays  [phone]   (Appointment scheduling available 24/7)     RN Line  [phone] option 2     Urgent Care - [place] - 11am-9pm Monday-Friday Saturday-Sunday- 9am-5pm     [place] -   5pm-9pm Monday-Friday Saturday-Sunday- 9am-5pm    [phone] - [place]    [phone] - [place]       For a Price Quote for your services, please call our Consumer Price Line at [phone].     What options do I have for visits at the clinic other than the traditional office visit?     To expand how we care for you, many of our providers are utilizing electronic visits (e-visits) and telephone visits, when medically appropriate, for interactions with their patients rather than a visit in the clinic. We also offer nurse visits for many medical concerns. Just like any other service, we will bill your insurance company for this type of visit based on time spent on the phone with your provider. Not all insurance companies cover these visits. Please check with your medical insurance if this type of visit is covered. You will be responsible for any charges that are not paid by your insurance.   E-visits via PowerOasis:  generally incur a $35.00 fee.     Telephone visits:  Time spent on the phone: *charged based on time that is spent on the phone in increments of 10 minutes. Estimated cost:   5-10 mins $30.00   11-20 mins. $59.00   21-30 mins. $85.00       Use [employer] (secure email communication and access to your chart) to send your primary care provider a message or make an appointment. Ask someone on your Team how to sign up for [employer].     As always, Thank you for trusting us with your health care needs!      [facility]:    Scheduling Appointments  [facility]  Call: [phone]    [facility]  Call: [phone]    [facility]  Call: [phone]    For Gastroenterology referrals   [facility]in[facility] and Surgery Center, 4th Floor   [address] [place] [address]   Appointments: [phone]    WHERE TO GO FOR CARE?  Clinic    Make an appointment if you:       Are sick (cold, cough, flu, sore throat, earache or in pain).       Have a small injury (sprain, small cut, burn or broken bone).       Need a physical exam, Pap smear, vaccine or prescription refill.       Have questions about your health or medicines.    To reach us:      Call [phone] ([phone]). Open 24 hours every day. (For counseling services, call [phone].)    Log into Specle at [email or web address]. (Visit [email or web address] to create an account.) Hospital emergency room    An emergency is a serious or life- threatening problem that must be treated right away.    Call 879 or get to the hospital if you have:      Very bad or sudden:            - Chest pain or pressure         - Bleeding         - Head or belly pain         - Dizziness or trouble seeing, walking or                          Speaking      Problems breathing      Blood in your vomit or you are coughing up blood      A major injury (knocked out, loss of a finger  or limb, rape, broken bone protruding from skin)    A mental health crisis. (Or call the Mental Health Crisis line at 1-317.630.4556 or Suicide Prevention Hotline at 1-993.939.2379.)    Open 24 hours every day. You don't need an appointment.     Urgent care    Visit urgent care for sickness or small injuries when the clinic is closed. You don't need an appointment. To check hours or find an urgent care near you, visit [email or web address]. Online care    Get online care from [facility] for more than 70 common problems, like colds, allergies and infections. Open 24 hours every day at:   [email or web address]   Need help deciding?    For advice about where to be seen, you may call your clinic and ask to speak with a nurse. We're here for you 24 hours every day.         If you are deaf or hard of hearing, please let us know. We provide many free services including sign language interpreters, oral interpreters, TTYs, telephone amplifiers, note takers and written materials.                   Follow-ups after your visit        Who to contact     If you have questions or need follow up information about today's clinic visit or your schedule please contact [facility] directly at [phone].  Normal or non-critical lab and imaging results will be communicated to you by SendUshart, letter or phone within 4 business days after the clinic has received the results. If you do not hear from us within 7 days, please contact the clinic through SendUshart or phone. If you have a critical or abnormal lab result, we will notify you by phone as soon as possible.  Submit refill requests through [employer] or call your pharmacy and they will forward the refill request to us. Please allow 3 business days for your refill to be completed.          Additional Information About Your Visit        [employer] Information     [employer] gives you secure access to your electronic health record. If you see a primary care provider, you can also send messages  "to your care team and make appointments. If you have questions, please call your primary care clinic.  If you do not have a primary care provider, please call [phone] and they will assist you.        Care EveryWhere ID     This is your Care EveryWhere ID. This could be used by other organizations to access your [place] medical records  [id number]        Your Vitals Were     Pulse Temperature Height Last Period Breastfeeding? BMI (Body Mass Index)    82 98.3  F (36.8  C) (Oral) 5' 9\" (1.753 m) 07/13/2007 No 22.74 kg/m2       Blood Pressure from Last 3 Encounters:   05/08/18 130/76   10/26/17 108/72   02/02/17 118/82    Weight from Last 3 Encounters:   05/08/18 154 lb (69.9 kg)   10/26/17 147 lb (66.7 kg)   02/02/17 159 lb (72.1 kg)              We Performed the Following     XR Hand Left G/E 3 Views        Primary Care Provider Office Phone # Fax #    Bandar Banuelos PA-C [phone]       [address] [facility] [address]        Equal Access to Services     [facility] ARABELLA : Hadii aad ku hadasho Soomaali, waaxda luqadaha, qaybta kaalmada adeegyada, waxay kourtneyin hayyingn goyoeg prabhakar lalana ah. So [facility] [phone].    ATENCIÓN: Si habla español, tiene a chamorro disposición servicios gratuitos de asistencia lingüística. Randal al [phone].    We comply with applicable federal civil rights laws and [place] laws. We do not discriminate on the basis of race, color, national origin, age, disability, sex, sexual orientation, or gender identity.            Thank you!     Thank you for choosing [facility]  for your care. Our goal is always to provide you with excellent care. Hearing back from our patients is one way we can continue to improve our services. Please take a few minutes to complete the written survey that you may receive in the mail after your visit with us. Thank you!             Your Updated Medication List - Protect others around you: Learn how to safely use, " store and throw away your medicines at www.disposemymeds.org.          This list is accurate as of 5/8/18  2:37 PM.  Always use your most recent med list.                   Brand Name Dispense Instructions for use Diagnosis    CALCIUM 500 + D PO      Take 1 tablet by mouth daily.        simvastatin 20 MG tablet    ZOCOR    90 tablet    Take 1 tablet (20 mg) by mouth At Bedtime    Hyperlipidemia LDL goal <130       tretinoin 0.025 % cream    RETIN-A    45 g    Use nightly on entire face    Acne vulgaris       zolpidem 5 MG tablet    AMBIEN    30 tablet    Take 1 tablet (5 mg) by mouth nightly as needed for sleep    Insomnia, unspecified type

## 2018-05-09 PROBLEM — M79.645 PAIN OF FINGER OF LEFT HAND: Status: ACTIVE | Noted: 2018-05-09

## 2018-06-19 ENCOUNTER — OFFICE VISIT (OUTPATIENT)
Dept: FAMILY MEDICINE | Facility: CLINIC | Age: 53
End: 2018-06-19
Payer: COMMERCIAL

## 2018-06-19 VITALS
BODY MASS INDEX: 21.77 KG/M2 | WEIGHT: 147 LBS | TEMPERATURE: 98 F | HEART RATE: 60 BPM | HEIGHT: 69 IN | DIASTOLIC BLOOD PRESSURE: 62 MMHG | SYSTOLIC BLOOD PRESSURE: 118 MMHG

## 2018-06-19 DIAGNOSIS — M79.642 PAIN OF LEFT HAND: Primary | ICD-10-CM

## 2018-06-19 PROCEDURE — 99213 OFFICE O/P EST LOW 20 MIN: CPT | Performed by: PHYSICIAN ASSISTANT

## 2018-06-19 NOTE — PROGRESS NOTES
"  SUBJECTIVE:   Isela Cartwright is a 53 year old female who presents to clinic today for the following health issues:    Hand pain - Follow up from appointment on 5/8/18, pain has not improved. Fell off a deck and landed on it, hyperextended. Notes swelling, stiffness and pain since. No weakness. X rays from 5/8 normal.     Patient Active Problem List   Diagnosis     Insomnia     Recurrent UTI     Hyperlipidemia LDL goal <160     Vaginal atrophy     Weight gain     Pain of finger of left hand      Current Outpatient Prescriptions   Medication     Calcium Carbonate-Vitamin D (CALCIUM 500 + D PO)     diclofenac (VOLTAREN) 1 % GEL topical gel     simvastatin (ZOCOR) 20 MG tablet     tretinoin (RETIN-A) 0.025 % cream     zolpidem (AMBIEN) 5 MG tablet     No current facility-administered medications for this visit.       Problem list and histories reviewed & adjusted, as indicated.  Additional history: as documented    Labs reviewed in EPIC    Reviewed and updated as needed this visit by clinical staff       Reviewed and updated as needed this visit by Provider         ROS:  Constitutional, HEENT, cardiovascular, pulmonary, gi and gu systems are negative, except as otherwise noted.    OBJECTIVE:     /62 (Cuff Size: Adult Regular)  Pulse 60  Temp 98  F (36.7  C) (Oral)  Ht 5' 9\" (1.753 m)  Wt 147 lb (66.7 kg)  LMP 07/13/2007  BMI 21.71 kg/m2  Body mass index is 21.71 kg/(m^2).  GENERAL: healthy, alert and no distress  MUSC: Left hand tender 2nd, 3rd digit of the PIP is erythematous and stiff, ROM is stiff but mostly intact. Normal exam otherwise     ASSESSMENT/PLAN:         ICD-10-CM    1. Pain of left hand M79.642 [facility] PT, HAND, AND CHIROPRACTIC REFERRAL     diclofenac (VOLTAREN) 1 % GEL topical gel      Persistent. Recommend PT and topical NSAID. Follow up as needed.    CECIL Castorena, PA-C   "

## 2018-06-19 NOTE — MR AVS SNAPSHOT
After Visit Summary   6/19/2018    Isela Cartwright    MRN: [id number]           Patient Information     Date Of Birth          1965        Visit Information        Provider Department      6/19/2018 12:40 PM Bandar Banuelos PA-C [facility]        Today's Diagnoses     Pain of left hand    -  1       Follow-ups after your visit        Additional Services     [facility] PT, HAND, AND CHIROPRACTIC REFERRAL       **This order will print in the [facility] Scheduling Office**    Physical Therapy, Hand Therapy and Chiropractic Care are available through:    *[facility]  *[facility]  *[facility]    Call one number to schedule at any of the above locations: [phone].    Your provider has referred you to: Hand Therapy    Indication/Reason for Referral: Left hand swelling following a fall - persisting for 5-6 weeks   Onset of Illness: 6+ weeks   Therapy Orders: Evaluate and Treat  Special Programs: None  Special Request: None    Shekhar Corrigan      Additional Comments for the Therapist or Chiropractor: Thanks     Please be aware that coverage of these services is subject to the terms and limitations of your health insurance plan.  Call member services at your health plan with any benefit or coverage questions.      Please bring the following to your appointment:    *Your personal calendar for scheduling future appointments  *Comfortable clothing                  Your next 10 appointments already scheduled     Aug 06, 2018  7:30 AM CDT   Screening Mammogram with [id number]   [facility] ([facility])    [address] [place] [id number] [phone]           Do NOT use body powder, lotions, perfume or deodorant the day of the exam.  If your last mammogram was not done at [place] [facility], please bring your mammogram films. We will need the name of your provider to send a copy of  "your report.  A mammogram may be covered on an annual or biannual basis, please check with your insurance company.              Who to contact     If you have questions or need follow up information about today's clinic visit or your schedule please contact [facility] directly at [phone].  Normal or non-critical lab and imaging results will be communicated to you by MyChart, letter or phone within 4 business days after the clinic has received the results. If you do not hear from us within 7 days, please contact the clinic through Btargethart or phone. If you have a critical or abnormal lab result, we will notify you by phone as soon as possible.  Submit refill requests through [employer] or call your pharmacy and they will forward the refill request to us. Please allow 3 business days for your refill to be completed.          Additional Information About Your Visit        MyChart Information     [employer] gives you secure access to your electronic health record. If you see a primary care provider, you can also send messages to your care team and make appointments. If you have questions, please call your primary care clinic.  If you do not have a primary care provider, please call [phone] and they will assist you.        Care EveryWhere ID     This is your Care EveryWhere ID. This could be used by other organizations to access your [place] medical records  [id number]        Your Vitals Were     Pulse Temperature Height Last Period BMI (Body Mass Index)       60 98  F (36.7  C) (Oral) 5' 9\" (1.753 m) 07/13/2007 21.71 kg/m2        Blood Pressure from Last 3 Encounters:   06/19/18 118/62   05/08/18 130/76   10/26/17 108/72    Weight from Last 3 Encounters:   06/19/18 147 lb (66.7 kg)   05/08/18 154 lb (69.9 kg)   10/26/17 147 lb (66.7 kg)              We Performed the Following     [facility] PT, HAND, AND CHIROPRACTIC REFERRAL          Today's Medication Changes          These changes are accurate as of " 6/19/18  1:00 PM.  If you have any questions, ask your nurse or doctor.               Start taking these medicines.        Dose/Directions    diclofenac 1 % Gel topical gel   Commonly known as:  VOLTAREN   Used for:  Pain of left hand   Started by:  Bandar Banuelos PA-C        Apply 4 grams to knees or 2 grams to hands four times daily using enclosed dosing card.   Quantity:  100 g   Refills:  1            Where to get your medicines      These medications were sent to [place] [address] [facility] - [place], [place] - [address]  [address], [place] [address]     Phone:  [phone]     diclofenac 1 % Gel topical gel                Primary Care Provider Office Phone # Fax #    Bandar Banuelos PA-C [phone]       [facility] [address]        Equal Access to Services     [facility] : Hadii aad ku hadasho Soomaali, waaxda luqadaha, qaybta kaalmada adeegyada, vahid oconnellin hayaan rob castro . So [facility] [phone].    ATENCIÓN: Si habla español, tiene a chamorro disposición servicios gratuitos de asistencia lingüística. Llame al [phone].    We comply with applicable federal civil rights laws and [place] laws. We do not discriminate on the basis of race, color, national origin, age, disability, sex, sexual orientation, or gender identity.            Thank you!     Thank you for choosing [facility]  for your care. Our goal is always to provide you with excellent care. Hearing back from our patients is one way we can continue to improve our services. Please take a few minutes to complete the written survey that you may receive in the mail after your visit with us. Thank you!             Your Updated Medication List - Protect others around you: Learn how to safely use, store and throw away your medicines at www.disposemymeds.org.          This list is accurate as of 6/19/18  1:00 PM.  Always use your most recent med list.                   Brand Name  Dispense Instructions for use Diagnosis    CALCIUM 500 + D PO      Take 1 tablet by mouth daily.        diclofenac 1 % Gel topical gel    VOLTAREN    100 g    Apply 4 grams to knees or 2 grams to hands four times daily using enclosed dosing card.    Pain of left hand       simvastatin 20 MG tablet    ZOCOR    90 tablet    Take 1 tablet (20 mg) by mouth At Bedtime    Hyperlipidemia LDL goal <130       tretinoin 0.025 % cream    RETIN-A    45 g    Use nightly on entire face    Acne vulgaris       zolpidem 5 MG tablet    AMBIEN    30 tablet    Take 1 tablet (5 mg) by mouth nightly as needed for sleep    Insomnia, unspecified type

## 2018-07-02 ENCOUNTER — THERAPY VISIT (OUTPATIENT)
Dept: OCCUPATIONAL THERAPY | Facility: CLINIC | Age: 53
End: 2018-07-02
Payer: COMMERCIAL

## 2018-07-02 DIAGNOSIS — M25.442 SWELLING OF HAND JOINT, LEFT: ICD-10-CM

## 2018-07-02 DIAGNOSIS — M79.642 PAIN OF LEFT HAND: ICD-10-CM

## 2018-07-02 PROCEDURE — 97165 OT EVAL LOW COMPLEX 30 MIN: CPT | Mod: GO | Performed by: OCCUPATIONAL THERAPIST

## 2018-07-02 PROCEDURE — 97140 MANUAL THERAPY 1/> REGIONS: CPT | Mod: GO | Performed by: OCCUPATIONAL THERAPIST

## 2018-07-02 PROCEDURE — 97110 THERAPEUTIC EXERCISES: CPT | Mod: GO | Performed by: OCCUPATIONAL THERAPIST

## 2018-07-02 NOTE — PROGRESS NOTES
Hand Therapy Initial Evaluation    Current Date: July, 2, 2018  Order Dr. Bandar Banuelos, Mitch and treat Left hand pain  Diagnosis: Left hand pain swelling and stiffness post fall  Procedure:  none  Post:  7w 6d        Subjective:  Isela Cartwright is a 53 year old female with a diagnosis of Left hand pain.   Right hand dominate  Patient reports symptoms of pain, stiffness/loss of motion, weakness/loss of strength and edema of the left index, middle, and ring fingers which occurred due to fall on outstretched hand. Since onset symptoms are Unchanged  Special tests:  x-ray.  Previous treatment: Pt home icing.    General health as reported by patient is excellent.  Pertinent medical history includes:Menopausal, Migraines/Headaches, Smoking  Medical allergies:Amoxocillion, Sulpha drugs, tetracycline.  Surgical history: none.  Medication history: High colesterol.    Occupational Profile Information:  Current occupation is   Currently working in normal job without restrictions  Job Tasks: Repetitive Tasks  Prior functional level:  no limitations  Barriers include:none  Mobility: No difficulty  Transportation: drives  Leisure activities/hobbies: garden and read  Other: mild restriction to job tasks, notes endrange stiffness and joint enlargement    Functional Outcome Measure:   Upper Extremity Functonal Index 68/80 mild impairment , sensitive to vibration.     Objective:  Pain Level Report  VAS(0-10) 7/2/2018   At Rest: minimal   With Use: 3/10     Report of Pain:  Location:  index finger, long finger and ring finger  Pain Quality:  Aching  Frequency: constant  day  Pain is worst:  daytime  Exacerbated by:   and repetitive work  Relieved by:  Maybe icing hard to say  Progression:  Unchanged   Edema:  MILD joint enlargement with nodule formation on radial side of PIP jt.   Sensation: WNL throughout all nerve distributions; per patient report    ROM:  Endrange limitation due to stiffness  DPC index 8 hours  cm, middle .6cm Ring and small full      Strength:  Strength:   (Measured in pounds)  Pain Report:  - none    + mild    ++ moderate    +++ severe    Right Left   Trials 3 3   1  2  3 75  73  70 45  50  50   Average: 73 48    mild discomfort with release of      Assessment:  Patient presents with symptoms consistent with diagnosis of hand hyper extension,  with conservative intervention.     Patient's limitations or Problem List includes:  Pain, Decreased ROM/motion, Increased edema, Weakness and Decreased  of the left hand which interferes with the patient's ability to perform Household Chores as compared to previous level of function.    Rehab Potential:  Excellent - Return to full activity, no limitations    Patient will benefit from skilled Occupational Therapy to decrease pain, edema and adherence of scarring to return to previous activity level and resume normal daily tasks and to reach their rehab potential.    Barriers to Learning:  No barrier    Communication Issues:  Patient appears to be able to clearly communicate and understand verbal and written communication and follow directions correctly.    Chart Review: Chart Review    Identified Performance Deficits: home establishment and management    Assessment of Occupational Performance:  1-3 Performance Deficits    Clinical Decision Making (Complexity): Low complexity    Treatment Explanation:  The following has been discussed with the patient:  RX ordered/plan of care  Anticipated outcomes  Possible risks and side effects    Plan:  Frequency:  1 X week, once daily  Duration:  for 1 months    Treatment Plan:   Modalities:  Heat ( warm water bath for exercise)  Therapeutic Exercise:  AROM, AAROM, PROM and Tendon Gliding  Manual Techniques:  Manual edema mobilization  Self Care:  Self Care Tasks  Discharge Plan:  Achieve all LTG.  Independent in home treatment program.  Reach maximal therapeutic benefit.    Home Exercise Program:  Hard copy  handouts: AROM tendon gliding exercises   MEM palm (webspaces) and dorsal fingers  Coban tape night sleeves  Kinesiotape dorsal hand and webspaces after tx.      Next Visit:  Recheck 1 week, reassess self management, progress to strengthening if swelling is resolving.

## 2018-07-02 NOTE — MR AVS SNAPSHOT
After Visit Summary   7/2/2018    Isela Cartwright    MRN: [id number]           Patient Information     Date Of Birth          1965        Visit Information        Provider Department      7/2/2018 7:30 AM Kaylan Humphrey [facility] [place]        Today's Diagnoses     Pain of left hand        Swelling of hand joint, left           Follow-ups after your visit        Your next 10 appointments already scheduled     Jul 10, 2018  8:00 AM CDT   SCAR Hand with Gissell Skelton   [facility] [place] ([facility] [place] [facility])    [address]  [place] [address] [phone]            Aug 06, 2018  7:30 AM CDT   Screening Mammogram with UCB[address] [facility] ([facility] [place])    [address] [place] [address], 2nd Floor  [facility] [address] [phone]           Do NOT use body powder, lotions, perfume or deodorant the day of the exam.  If your last mammogram was not done at [place], please bring your mammogram films. We will need the name of your provider to send a copy of your report.  A mammogram may be covered on an annual or biannual basis, please check with your insurance company.              Who to contact     If you have questions or need follow up information about today's clinic visit or your schedule please contact [facility] [place] directly at [phone].  Normal or non-critical lab and imaging results will be communicated to you by MyChart, letter or phone within 4 business days after the clinic has received the results. If you do not hear from us within 7 days, please contact the clinic through MyChart or phone. If you have a critical or abnormal lab result, we will notify you by phone as soon as possible.  Submit refill requests through SkyJam or call your pharmacy and they will forward the refill request to us. Please allow 3  business days for your refill to be completed.          Additional Information About Your Visit        MyChart Information     [email or web address] gives you secure access to your electronic health record. If you see a primary care provider, you can also send messages to your care team and make appointments. If you have questions, please call your primary care clinic.  If you do not have a primary care provider, please call [phone] and they will assist you.        Care EveryWhere ID     This is your Care EveryWhere ID. This could be used by other organizations to access your [place] medical records  [id number]        Your Vitals Were     Last Period                   07/13/2007            Blood Pressure from Last 3 Encounters:   06/19/18 118/62   05/08/18 130/76   10/26/17 108/72    Weight from Last 3 Encounters:   06/19/18 66.7 kg (147 lb)   05/08/18 69.9 kg (154 lb)   10/26/17 66.7 kg (147 lb)              We Performed the Following     INITIAL EVAL REPORT     MANUAL THER TECH     OT Eval, Low Complexity ([address])     THERAPEUTIC EXERCISES        Primary Care Provider Office Phone # Fax #    Bandar Banuelos PA-C [phone]       [address] [facility] [address]        Equal Access to Services     LINDA BELL : Hadii aad ku hadasho Soomaali, waaxda luqadaha, qaybta kaalmada adeegyada, vahid marshn rob hanson. So [facility] [phone].    ATENCIÓN: Si habla español, tiene a chamorro disposición servicios gratuitos de asistencia lingüística. Llame al [phone].    We comply with applicable federal civil rights laws and [place] laws. We do not discriminate on the basis of race, color, national origin, age, disability, sex, sexual orientation, or gender identity.            Thank you!     Thank you for choosing [facility] [place]  for your care. Our goal is always to provide you with excellent care. Hearing back from our patients is one way we can  continue to improve our services. Please take a few minutes to complete the written survey that you may receive in the mail after your visit with us. Thank you!             Your Updated Medication List - Protect others around you: Learn how to safely use, store and throw away your medicines at www.disposemymeds.org.          This list is accurate as of 7/2/18 11:51 AM.  Always use your most recent med list.                   Brand Name Dispense Instructions for use Diagnosis    CALCIUM 500 + D PO      Take 1 tablet by mouth daily.        diclofenac 1 % Gel topical gel    VOLTAREN    100 g    Apply 4 grams to knees or 2 grams to hands four times daily using enclosed dosing card.    Pain of left hand       simvastatin 20 MG tablet    ZOCOR    90 tablet    Take 1 tablet (20 mg) by mouth At Bedtime    Hyperlipidemia LDL goal <130       tretinoin 0.025 % cream    RETIN-A    45 g    Use nightly on entire face    Acne vulgaris       zolpidem 5 MG tablet    AMBIEN    30 tablet    Take 1 tablet (5 mg) by mouth nightly as needed for sleep    Insomnia, unspecified type

## 2018-07-10 ENCOUNTER — THERAPY VISIT (OUTPATIENT)
Dept: OCCUPATIONAL THERAPY | Facility: CLINIC | Age: 53
End: 2018-07-10
Payer: COMMERCIAL

## 2018-07-10 DIAGNOSIS — M79.642 PAIN OF LEFT HAND: ICD-10-CM

## 2018-07-10 DIAGNOSIS — M25.442 SWELLING OF HAND JOINT, LEFT: ICD-10-CM

## 2018-07-10 PROCEDURE — 97110 THERAPEUTIC EXERCISES: CPT | Mod: GO | Performed by: OCCUPATIONAL THERAPIST

## 2018-07-10 NOTE — MR AVS SNAPSHOT
After Visit Summary   7/10/2018    Isela Cartwright    MRN: [id number]           Patient Information     Date Of Birth          1965        Visit Information        Provider Department      7/10/2018 8:00 AM Gissell Skelton [facility]note 3[facility] [place]        Today's Diagnoses     Pain of left hand        Swelling of hand joint, left           Follow-ups after your visit        Your next 10 appointments already scheduled     Jul 24, 2018  7:30 AM CDT   SCAR Hand with Gissell Skelton   [facility]note 3[facility] [place] ([facility] [place] [facility])    [address]  [place] [address] [phone]            Aug 06, 2018  7:30 AM CDT   Screening Mammogram with UCB[address] [facility] ([facility] [place])    [address], [address]nd [address]  [facility] [address] [phone]           Do NOT use body powder, lotions, perfume or deodorant the day of the exam.  If your last mammogram was not done at [place], please bring your mammogram films. We will need the name of your provider to send a copy of your report.  A mammogram may be covered on an annual or biannual basis, please check with your insurance company.              Who to contact     If you have questions or need follow up information about today's clinic visit or your schedule please contact [facility] [place] directly at [phone].  Normal or non-critical lab and imaging results will be communicated to you by MyChart, letter or phone within 4 business days after the clinic has received the results. If you do not hear from us within 7 days, please contact the clinic through MyChart or phone. If you have a critical or abnormal lab result, we will notify you by phone as soon as possible.  Submit refill requests through Higher One or call your pharmacy and they will forward the refill request to us. Please allow 3  business days for your refill to be completed.          Additional Information About Your Visit        MyChart Information     [employer] [facility] gives you secure access to your electronic health record. If you see a primary care provider, you can also send messages to your care team and make appointments. If you have questions, please call your primary care clinic.  If you do not have a primary care provider, please call [phone] and they will assist you.        Care EveryWhere ID     This is your Care EveryWhere ID. This could be used by other organizations to access your [place] medical records  [id number]        Your Vitals Were     Last Period                   07/13/2007            Blood Pressure from Last 3 Encounters:   06/19/18 118/62   05/08/18 130/76   10/26/17 108/72    Weight from Last 3 Encounters:   06/19/18 66.7 kg (147 lb)   05/08/18 69.9 kg (154 lb)   10/26/17 66.7 kg (147 lb)              We Performed the Following     THERAPEUTIC EXERCISES        Primary Care Provider Office Phone # Fax #    Bandar Banuelos PA-C [phone]       [facility] [address]        Equal Access to Services     [facility] : Hadii aad ku hadasho Soomaali, waaxda luqadaha, qaybta kaalmada adeegyada, vahid marshn rob castro . So [facility] [phone].    ATENCIÓN: Si habla español, tiene a chamorro disposición servicios gratuitos de asistencia lingüística. Llame al [phone].    We comply with applicable federal civil rights laws and [place] laws. We do not discriminate on the basis of race, color, national origin, age, disability, sex, sexual orientation, or gender identity.            Thank you!     Thank you for choosing [facility] [place]  for your care. Our goal is always to provide you with excellent care. Hearing back from our patients is one way we can continue to improve our services. Please take a few minutes to complete the  written survey that you may receive in the mail after your visit with us. Thank you!             Your Updated Medication List - Protect others around you: Learn how to safely use, store and throw away your medicines at www.disposemymeds.org.          This list is accurate as of 7/10/18  8:29 AM.  Always use your most recent med list.                   Brand Name Dispense Instructions for use Diagnosis    CALCIUM 500 + D PO      Take 1 tablet by mouth daily.        diclofenac 1 % Gel topical gel    VOLTAREN    100 g    Apply 4 grams to knees or 2 grams to hands four times daily using enclosed dosing card.    Pain of left hand       simvastatin 20 MG tablet    ZOCOR    90 tablet    Take 1 tablet (20 mg) by mouth At Bedtime    Hyperlipidemia LDL goal <130       tretinoin 0.025 % cream    RETIN-A    45 g    Use nightly on entire face    Acne vulgaris       zolpidem 5 MG tablet    AMBIEN    30 tablet    Take 1 tablet (5 mg) by mouth nightly as needed for sleep    Insomnia, unspecified type

## 2018-07-10 NOTE — PROGRESS NOTES
SOAP note objective information for 7/10/2018:    ROM:  AROM(PROM) 7/10/18    Right   Index MP 0/90   PIP 0/95   DIP 0/60   Long MP 0/90   PIP 0/100   DIP 0/65     Home Exercise Program:  Warmth for stiffness  AROM tendon gliding exercises with 3 fists  PIP and DIP blocking exercises  PROM for hook and composite flexion   strengthening with foam wedge  Coban wrap sleeping for swelling as needed    Next Visit:  See in 2 weeks  Assess response to PROM, blocking and  strengthening    Please refer to the daily flowsheet for treatment today, total treatment time and time spent performing 1:1 timed codes.

## 2018-07-24 ENCOUNTER — THERAPY VISIT (OUTPATIENT)
Dept: OCCUPATIONAL THERAPY | Facility: CLINIC | Age: 53
End: 2018-07-24
Payer: COMMERCIAL

## 2018-07-24 DIAGNOSIS — M79.642 PAIN OF LEFT HAND: ICD-10-CM

## 2018-07-24 DIAGNOSIS — M25.442 SWELLING OF HAND JOINT, LEFT: ICD-10-CM

## 2018-07-24 PROCEDURE — 97110 THERAPEUTIC EXERCISES: CPT | Mod: GO | Performed by: OCCUPATIONAL THERAPIST

## 2018-07-24 NOTE — PROGRESS NOTES
Hand Therapy Progress/Discharge Note    Current Date:  7/24/2018    Reporting period is 7/2/2018 to 7/24/2018    Subjective:   Subjective changes noted by patient:  Fingers still are a little stiff and sore but it helps to use heat and stretch.  Functional changes noted by patient:  Improvement in Self Care Tasks (dressing) and Household Chores  Patient has noted adverse reaction to:  None    Functional Outcome Measure:  Upper Extremity Functional Index  SCORE:   Column Totals: 73/80  (A lower score indicates greater disability.)    Objective:  ROM:  AROM(PROM) 7/10/18 7/24/18    Left Left   Index MP 0/90 0/95   PIP 0/95 0/105   DIP 0/60 0/70   Long MP 0/90 0/90   PIP 0/100 0/105   DIP 0/65 0/75     Strength:   (Measured in pounds)    7/24/18   Trials Right Left   1  2  3 50  57  52 40-  42-  40-   Average: 53 41     Edema: Mild of index and long fingers    Sensation:  WNL throughout all nerve distributions; per patient report      Pain Level Report  VAS(0-10) 7/2/18 7/24/18   At Rest: minimal    With Use: 3/10 3/10   Location:  index finger, long finger and ring finger    Please refer to the daily flowsheet for treatment provided today.     Assessment:  Response to therapy has been improvement to:  ROM of Fingers: All Planes    Overall Assessment:  Patient's symptoms are resolving and patient is independent in home exercise program  STG/LTG:  STGoals have been reviewed and progress or achievement has occurred;  see goal sheet for details and updates.  I have re-evaluated this patient and find that the nature, scope, duration and intensity of the therapy is appropriate for the medical condition of the patient.    Plan:  Frequency/Duration:  Discharge from Hand Therapy; continue home program.    Recommendations for Continued Therapy  Home Exercise Program:  Warmth for stiffness  AROM tendon gliding exercises with 3 fists  PIP and DIP blocking exercises  PROM for hook and composite flexion   strengthening with  foam wedge  3 point pinch strengthening with foam wedge  Coban wrap sleeping for swelling as needed

## 2018-07-24 NOTE — MR AVS SNAPSHOT
After Visit Summary   7/24/2018    Isela Cartwright    MRN: [id number]           Patient Information     Date Of Birth          1965        Visit Information        Provider Department      7/24/2018 7:30 AM Gissell Skelton [facility] [place]        Today's Diagnoses     Pain of left hand        Swelling of hand joint, left           Follow-ups after your visit        Your next 10 appointments already scheduled     Aug 06, 2018  7:30 AM CDT   Screening Mammogram with [id number]   [facility] ([facility])    [address] [place] [address], [address]nd Floor  [facility] [address] [phone]           Do NOT use body powder, lotions, perfume or deodorant the day of the exam.  If your last mammogram was not done at [place], please bring your mammogram films. We will need the name of your provider to send a copy of your report.  A mammogram may be covered on an annual or biannual basis, please check with your insurance company.              Who to contact     If you have questions or need follow up information about today's clinic visit or your schedule please contact [facility] [place] directly at [phone].  Normal or non-critical lab and imaging results will be communicated to you by MyChart, letter or phone within 4 business days after the clinic has received the results. If you do not hear from us within 7 days, please contact the clinic through MyChart or phone. If you have a critical or abnormal lab result, we will notify you by phone as soon as possible.  Submit refill requests through Covercake or call your pharmacy and they will forward the refill request to us. Please allow 3 business days for your refill to be completed.          Additional Information About Your Visit        MyChart Information     Covercake gives you secure access to your electronic health record. If you see a primary care  provider, you can also send messages to your care team and make appointments. If you have questions, please call your primary care clinic.  If you do not have a primary care provider, please call [phone] and they will assist you.        Care EveryWhere ID     This is your Care EveryWhere ID. This could be used by other organizations to access your [place] medical records  [id number]        Your Vitals Were     Last Period                   07/13/2007            Blood Pressure from Last 3 Encounters:   06/19/18 118/62   05/08/18 130/76   10/26/17 108/72    Weight from Last 3 Encounters:   06/19/18 66.7 kg (147 lb)   05/08/18 69.9 kg (154 lb)   10/26/17 66.7 kg (147 lb)              We Performed the Following     SCAR PROGRESS NOTES REPORT     THERAPEUTIC EXERCISES        Primary Care Provider Office Phone # Fax #    Bandar Banuelos PA-C [phone]       [address] [facility] [address]        Equal Access to Services     LINDA BELL : Hadii aad ku hadasho Soomaali, waaxda luqadaha, qaybta kaalmada adeegyada, waxay idiin hayaan rob castro . So [facility] [phone].    ATENCIÓN: Si habla español, tiene a chamorro disposición servicios gratuitos de asistencia lingüística. Ll[facility]ultman [facility] [phone].    We comply with applicable federal civil rights laws and [place] laws. We do not discriminate on the basis of race, color, national origin, age, disability, sex, sexual orientation, or gender identity.            Thank you!     Thank you for choosing [facility] [place]  for your care. Our goal is always to provide you with excellent care. Hearing back from our patients is one way we can continue to improve our services. Please take a few minutes to complete the written survey that you may receive in the mail after your visit with us. Thank you!             Your Updated Medication List - Protect others around you: Learn how to safely use, store and  throw away your medicines at www.disposemymeds.org.          This list is accurate as of 7/24/18  7:55 AM.  Always use your most recent med list.                   Brand Name Dispense Instructions for use Diagnosis    CALCIUM 500 + D PO      Take 1 tablet by mouth daily.        diclofenac 1 % Gel topical gel    VOLTAREN    100 g    Apply 4 grams to knees or 2 grams to hands four times daily using enclosed dosing card.    Pain of left hand       simvastatin 20 MG tablet    ZOCOR    90 tablet    Take 1 tablet (20 mg) by mouth At Bedtime    Hyperlipidemia LDL goal <130       tretinoin 0.025 % cream    RETIN-A    45 g    Use nightly on entire face    Acne vulgaris       zolpidem 5 MG tablet    AMBIEN    30 tablet    Take 1 tablet (5 mg) by mouth nightly as needed for sleep    Insomnia, unspecified type

## 2018-08-06 DIAGNOSIS — Z12.31 VISIT FOR SCREENING MAMMOGRAM: ICD-10-CM

## 2018-09-10 ENCOUNTER — OFFICE VISIT (OUTPATIENT)
Dept: FAMILY MEDICINE | Facility: CLINIC | Age: 53
End: 2018-09-10
Payer: COMMERCIAL

## 2018-09-10 VITALS
SYSTOLIC BLOOD PRESSURE: 122 MMHG | HEART RATE: 68 BPM | TEMPERATURE: 98.2 F | DIASTOLIC BLOOD PRESSURE: 72 MMHG | BODY MASS INDEX: 21.4 KG/M2 | HEIGHT: 69 IN | WEIGHT: 144.5 LBS

## 2018-09-10 DIAGNOSIS — B88.0 CHIGGER BITES: Primary | ICD-10-CM

## 2018-09-10 PROCEDURE — 99213 OFFICE O/P EST LOW 20 MIN: CPT | Performed by: PHYSICIAN ASSISTANT

## 2018-09-10 NOTE — PATIENT INSTRUCTIONS
The bites seem consistent with chigger bites.  Vigorous scrubbing with soap and water.  I would recommend using topical steroid 3 times daily for redness and itching.  Take over the counter Zyrtec/cetirizine x 5-7 days.  Call if this worsens.    Alem Boswell PA-C

## 2018-09-10 NOTE — MR AVS SNAPSHOT
After Visit Summary   9/10/2018    Isela Cartwright    MRN: [id number]           Patient Information     Date Of Birth          1965        Visit Information        Provider Department      9/10/2018 10:40 AM Alem Boswell PA-C [facility]        Today's Diagnoses     Chigger bites    -  1      Care Instructions    The bites seem consistent with chigger bites.  I would recommend using topical steroid 3 times daily for redness and itching.  Take over the counter Zyrtec/cetirizine x 5-7 days.  Call if this worsens.    Alem Boswell PA-C            Follow-ups after your visit        Your next 10 appointments already scheduled     Sep 20, 2018  2:20 PM CDT   [facility] with Bandar Banuelos PA-C   [facility] ([facility])    [address] [place] [id number] [phone]              Who to contact     If you have questions or need follow up information about today's clinic visit or your schedule please contact [facility] directly at [phone].  Normal or non-critical lab and imaging results will be communicated to you by Kim[employer] Systemshart, letter or phone within 4 business days after the clinic has received the results. If you do not hear from us within 7 days, please contact the clinic through Kimera Systemshart or phone. If you have a critical or abnormal lab result, we will notify you by phone as soon as possible.  Submit refill requests through [employer] or call your pharmacy and they will forward the refill request to us. Please allow 3 business days for your refill to be completed.          Additional Information About Your Visit        MyChart Information     [employer] gives you secure access to your electronic health record. If you see a primary care provider, you can also send messages to your care team and make appointments. If you have questions, please call your primary care clinic.  If you do not have  "a primary care provider, please call [phone] and they will assist you.        Care EveryWhere ID     This is your Care EveryWhere ID. This could be used by other organizations to access your [place] medical records  [id number]        Your Vitals Were     Pulse Temperature Height Last Period BMI (Body Mass Index)       68 98.2  F (36.8  C) (Oral) 5' 9\" (1.753 m) 07/13/2007 21.34 kg/m2        Blood Pressure from Last 3 Encounters:   09/10/18 122/72   06/19/18 118/62   05/08/18 130/76    Weight from Last 3 Encounters:   09/10/18 144 lb 8 oz (65.5 kg)   06/19/18 147 lb (66.7 kg)   05/08/18 154 lb (69.9 kg)              Today, you had the following     No orders found for display       Primary Care Provider Office Phone # Fax #    Bandar Banuelos PA-C [phone]       [facility] [address]        Equal Access to Services     [facility]: Hadii aad ku hadasho Soomaali, waaxda luqadaha, qaybta kaalmada adeegyada, waxclemente castro . So [facility] [phone].    ATENCIÓN: Si habla español, tiene a chamorro disposición servicios gratuitos de asistencia lingüística. Llame al [phone].    We comply with applicable federal civil rights laws and [place] laws. We do not discriminate on the basis of race, color, national origin, age, disability, sex, sexual orientation, or gender identity.            Thank you!     Thank you for choosing [facility]  for your care. Our goal is always to provide you with excellent care. Hearing back from our patients is one way we can continue to improve our services. Please take a few minutes to complete the written survey that you may receive in the mail after your visit with us. Thank you!             Your Updated Medication List - Protect others around you: Learn how to safely use, store and throw away your medicines at www.disposemymeds.org.          This list is accurate as of 9/10/18 11:23 AM.  Always use " your most recent med list.                   Brand Name Dispense Instructions for use Diagnosis    CALCIUM 500 + D PO      Take 1 tablet by mouth daily.        simvastatin 20 MG tablet    ZOCOR    90 tablet    Take 1 tablet (20 mg) by mouth At Bedtime    Hyperlipidemia LDL goal <130       tretinoin 0.025 % cream    RETIN-A    45 g    Use nightly on entire face    Acne vulgaris       zolpidem 5 MG tablet    AMBIEN    30 tablet    Take 1 tablet (5 mg) by mouth nightly as needed for sleep    Insomnia, unspecified type

## 2018-09-10 NOTE — PROGRESS NOTES
"  SUBJECTIVE:   Isela Cartwright is a 53 year old female who presents to clinic today for the following health issues:      Rash  Onset: About 1 week ago, believes they are some type of bug bites.    Description:   Location: both legs from the calves down to feet.    Character: raised, red  Itching (Pruritis): YES- just recently started itching    Progression of Symptoms:  Worsened on Saturday with more spots showing up    Accompanying Signs & Symptoms:  Fever: no   Body aches or joint pain: no   Sore throat symptoms: no   Recent cold symptoms: no     History:   Previous similar rash: no     Precipitating factors:   Exposure to similar rash: no   New exposures: None   Recent travel: no   Had been working out in her grass/garden.    Alleviating factors:  The cortisone cream might be helping    Therapies Tried and outcome: cortisone     -------------------------------------    Problem list and histories reviewed & adjusted, as indicated.  Additional history: as documented    Reviewed and updated as needed this visit by clinical staff       Reviewed and updated as needed this visit by Provider         ROS:  Constitutional, HEENT, cardiovascular, pulmonary, gi and gu systems are negative, except as otherwise noted.    OBJECTIVE:     /72 (BP Location: Right arm, Cuff Size: Adult Regular)  Pulse 68  Temp 98.2  F (36.8  C) (Oral)  Ht 5' 9\" (1.753 m)  Wt 144 lb 8 oz (65.5 kg)  LMP 07/13/2007  BMI 21.34 kg/m2  Body mass index is 21.34 kg/(m^2).  GENERAL: healthy, alert and no distress  RESP: lungs clear to auscultation - no rales, rhonchi or wheezes  CV: regular rate and rhythm, normal S1 S2, no S3 or S4, no murmur, click or rub, no peripheral edema and peripheral pulses strong  SKIN: bilateral lower legs and feet with scattered erythematous papules.    Diagnostic Test Results:  none     ASSESSMENT/PLAN:   1. Chigger bites  Patient Instructions   The bites seem consistent with chigger bites.  Vigorous scrubbing " with soap and water.  I would recommend using topical steroid 3 times daily for redness and itching.  Take over the counter Zyrtec/cetirizine x 5-7 days.  Call if this worsens.    DASHAWN Chi PA-C  [facility]

## 2018-09-20 ENCOUNTER — RADIANT APPOINTMENT (OUTPATIENT)
Dept: GENERAL RADIOLOGY | Facility: CLINIC | Age: 53
End: 2018-09-20
Attending: PHYSICIAN ASSISTANT
Payer: COMMERCIAL

## 2018-09-20 ENCOUNTER — OFFICE VISIT (OUTPATIENT)
Dept: FAMILY MEDICINE | Facility: CLINIC | Age: 53
End: 2018-09-20
Payer: COMMERCIAL

## 2018-09-20 VITALS
HEIGHT: 69 IN | DIASTOLIC BLOOD PRESSURE: 72 MMHG | WEIGHT: 143.13 LBS | SYSTOLIC BLOOD PRESSURE: 122 MMHG | TEMPERATURE: 98.4 F | HEART RATE: 72 BPM | BODY MASS INDEX: 21.2 KG/M2

## 2018-09-20 DIAGNOSIS — M25.511 ACUTE PAIN OF RIGHT SHOULDER: ICD-10-CM

## 2018-09-20 DIAGNOSIS — M79.672 LEFT FOOT PAIN: Primary | ICD-10-CM

## 2018-09-20 DIAGNOSIS — G47.00 INSOMNIA, UNSPECIFIED TYPE: ICD-10-CM

## 2018-09-20 PROCEDURE — 73030 X-RAY EXAM OF SHOULDER: CPT | Mod: RT

## 2018-09-20 PROCEDURE — 99214 OFFICE O/P EST MOD 30 MIN: CPT | Performed by: PHYSICIAN ASSISTANT

## 2018-09-20 RX ORDER — ZOLPIDEM TARTRATE 5 MG/1
5 TABLET ORAL
Qty: 30 TABLET | Refills: 2 | Status: CANCELLED | OUTPATIENT
Start: 2018-09-20

## 2018-09-20 RX ORDER — MELOXICAM 15 MG/1
15 TABLET ORAL DAILY
Qty: 14 TABLET | Refills: 1 | Status: SHIPPED | OUTPATIENT
Start: 2018-09-20 | End: 2019-05-31

## 2018-09-20 RX ORDER — ZOLPIDEM TARTRATE 10 MG/1
10 TABLET ORAL
Qty: 30 TABLET | Refills: 2 | Status: SHIPPED | OUTPATIENT
Start: 2018-09-20 | End: 2019-05-10

## 2018-09-20 ASSESSMENT — PAIN SCALES - GENERAL: PAINLEVEL: MODERATE PAIN (5)

## 2018-09-20 NOTE — PATIENT INSTRUCTIONS
"1. Left foot pain  --Do \"Contrast Baths\" ) hot / cold soaks for up to 5 minutes  --Wear tennis shoes or other shoe with good sole on them and consider inserts - Dr. Newman's or similar   --Could do PT, See Podiatry etc if not improving  2. Ice the arm for 2 days - then switch to heat, gentle stretching, isometric exercises as shown - see if the mobic anti inflammatory medication helps   "

## 2018-09-20 NOTE — PROGRESS NOTES
"  SUBJECTIVE:   Isela Cartwright is a 53 year old female who presents to clinic today for the following health issues:      Medication Followup of Ambien    Taking Medication as prescribed: yes    Side Effects:  None    Medication Helping Symptoms:  No - would like to increase dosage    She uses this rarely when traveling. Was on 10 mg in the past and tolerated well but reports that the 5 mg isn't working      Joint Pain    Onset: over 1 month    Description:   Location: Top of left foot  Character: \"like I stretched something\"    Intensity: mild    Progression of Symptoms: Someday's it seems worse    Accompanying Signs & Symptoms:  Other symptoms: none    History:   Previous similar pain: no       Precipitating factors:   Trauma or overuse: Not sure if related to fall she had in May and injured her fingers    Alleviating factors:  Improved by: nothing    Therapies Tried and outcome: hasn't tried anything    Right arm pain - located proximal along the right humerus area. Denies an injury recently but did have a fracture a few years ago that as far as we know healed well.     Patient Active Problem List   Diagnosis     Insomnia     Recurrent UTI     Hyperlipidemia LDL goal <160     Vaginal atrophy     Weight gain     Pain of finger of left hand      Current Outpatient Prescriptions   Medication     Calcium Carbonate-Vitamin D (CALCIUM 500 + D PO)     meloxicam (MOBIC) 15 MG tablet     simvastatin (ZOCOR) 20 MG tablet     tretinoin (RETIN-A) 0.025 % cream     zolpidem (AMBIEN) 10 MG tablet     No current facility-administered medications for this visit.         Problem list and histories reviewed & adjusted, as indicated.  Additional history: as documented    Labs reviewed in EPIC    Reviewed and updated as needed this visit by clinical staff  Tobacco  Allergies  Meds  Med Hx  Surg Hx  Fam Hx  Soc Hx      Reviewed and updated as needed this visit by Provider         ROS:  Constitutional, HEENT, cardiovascular, " "pulmonary, gi and gu systems are negative, except as otherwise noted.    OBJECTIVE:     /72 (BP Location: Right arm, Cuff Size: Adult Regular)  Pulse 72  Temp 98.4  F (36.9  C) (Oral)  Ht 5' 9\" (1.753 m)  Wt 143 lb 2 oz (64.9 kg)  LMP 07/13/2007  BMI 21.14 kg/m2  Body mass index is 21.14 kg/(m^2).  GENERAL: healthy, alert and no distress  MUSC: Right arm tender muscle groups lateral arm, ROM of the shoulder and elbow are normal, left  over flexural surface of the left foot, she has distal 2nd and 3rd metatarsal tenderness on exam but an otherwise normal exam. She has pes cavus     X ray arm - normal  ASSESSMENT/PLAN:     (M79.672) Left foot pain  (primary encounter diagnosis)  Comment:   Plan: meloxicam (MOBIC) 15 MG tablet        This appears to probably be metatarsalgia with some flexural tendinous overuse due to compensation for the metatarsal pain. Recommend PRICE and wear good tennis shoes, contrast baths. She declines an x ray and walking boot, wants to monitor. Refer to PT if persisting.    (G47.00) Insomnia, unspecified type  Comment:   Plan: zolpidem (AMBIEN) 10 MG tablet        Can try 10 mg again. This prescription is given with a discussion of side effects, risks and proper use.  Instructions are given to follow up if not improving or symptoms change or worsen as discussed.     (M25.511) Acute pain of right shoulder  Comment:   Plan: XR Shoulder Right 2 Views        Exam suggest muscular but due to her prior history of a fracture I advised an x ray. The x ray was normal. Recommend ice to the area, mild stretching and the anti inflammatory as above. This prescription is given with a discussion of side effects, risks and proper use.  Instructions are given to follow up if not improving or symptoms change or worsen as discussed.     Bandar Banuelos, DOMINICKS, PA-C      "

## 2018-09-20 NOTE — MR AVS SNAPSHOT
"              After Visit Summary   9/20/2018    Isela Cartwright    MRN: [id number]           Patient Information     Date Of Birth          1965        Visit Information        Provider Department      9/20/2018 2:20 PM Bandar Banuelos PA-C [facility]        Today's Diagnoses     Left foot pain    -  1    Insomnia, unspecified type        Acute pain of right shoulder          Care Instructions    1. Left foot pain  --Do \"Contrast Baths\" ) hot / cold soaks for up to 5 minutes  --Wear tennis shoes or other shoe with good sole on them and consider inserts - Dr. Hernandez or similar   --Could do PT, See Podiatry etc if not improving  2. Ice the arm for 2 days - then switch to heat, gentle stretching, isometric exercises as shown - see if the mobic anti inflammatory medication helps           Follow-ups after your visit        Who to contact     If you have questions or need follow up information about today's clinic visit or your schedule please contact [facility] directly at [phone].  Normal or non-critical lab and imaging results will be communicated to you by Nalahart, letter or phone within 4 business days after the clinic has received the results. If you do not hear from us within 7 days, please contact the clinic through [employer] or phone. If you have a critical or abnormal lab result, we will notify you by phone as soon as possible.  Submit refill requests through [employer] or call your pharmacy and they will forward the refill request to us. Please allow 3 business days for your refill to be completed.          Additional Information About Your Visit        Nalahart Information     [employer] gives you secure access to your electronic health record. If you see a primary care provider, you can also send messages to your care team and make appointments. If you have questions, please call your primary care clinic.  If you do not have a primary care provider, please call " "[phone] and they will assist you.        Care EveryWhere ID     This is your Care EveryWhere ID. This could be used by other organizations to access your [place] medical records  [id number]        Your Vitals Were     Pulse Temperature Height Last Period BMI (Body Mass Index)       72 98.4  F (36.9  C) (Oral) 5' 9\" (1.753 m) 07/13/2007 21.14 kg/m2        Blood Pressure from Last 3 Encounters:   09/20/18 122/72   09/10/18 122/72   06/19/18 118/62    Weight from Last 3 Encounters:   09/20/18 143 lb 2 oz (64.9 kg)   09/10/18 144 lb 8 oz (65.5 kg)   06/19/18 147 lb (66.7 kg)                 Today's Medication Changes          These changes are accurate as of 9/20/18  3:08 PM.  If you have any questions, ask your nurse or doctor.               Start taking these medicines.        Dose/Directions    meloxicam 15 MG tablet   Commonly known as:  MOBIC   Used for:  Left foot pain   Started by:  Bandar Banuelos PA-C        Dose:  15 mg   Take 1 tablet (15 mg) by mouth daily   Quantity:  14 tablet   Refills:  1         These medicines have changed or have updated prescriptions.        Dose/Directions    zolpidem 10 MG tablet   Commonly known as:  AMBIEN   This may have changed:    - medication strength  - how much to take   Used for:  Insomnia, unspecified type   Changed by:  Bandar Banuelos PA-C        Dose:  10 mg   Take 1 tablet (10 mg) by mouth nightly as needed for sleep   Quantity:  30 tablet   Refills:  2            Where to get your medicines      These medications were sent to [place] [address] [facility] [place]  [address]  [address] [place] [address]     Phone:  [phone]     meloxicam 15 MG tablet         Some of these will need a paper prescription and others can be bought over the counter.  Ask your nurse if you have questions.     Bring a paper prescription for each of these medications     zolpidem 10 MG tablet                Primary Care Provider Office Phone # Fax #    Bandar " Andrew Banuelos PA-C [phone]       [address] [facility] [address]        Equal Access to Services     LINDA BELL : Wesley Olivera, wacedric salgadobarbiha, jesseta karoxda jacinda, vahid kourtneyin hayaan goyoamada radford matthew hanson. So [facility] [phone].    ATENCIÓN: Si habla español, tiene a chamorro disposición servicios gratuitos de asistencia lingüística. Llame al [phone].    We comply with applicable federal civil rights laws and [place] laws. We do not discriminate on the basis of race, color, national origin, age, disability, sex, sexual orientation, or gender identity.            Thank you!     Thank you for choosing [facility]  for your care. Our goal is always to provide you with excellent care. Hearing back from our patients is one way we can continue to improve our services. Please take a few minutes to complete the written survey that you may receive in the mail after your visit with us. Thank you!             Your Updated Medication List - Protect others around you: Learn how to safely use, store and throw away your medicines at www.disposemymeds.org.          This list is accurate as of 9/20/18  3:08 PM.  Always use your most recent med list.                   Brand Name Dispense Instructions for use Diagnosis    CALCIUM 500 + D PO      Take 1 tablet by mouth daily.        meloxicam 15 MG tablet    MOBIC    14 tablet    Take 1 tablet (15 mg) by mouth daily    Left foot pain       simvastatin 20 MG tablet    ZOCOR    90 tablet    Take 1 tablet (20 mg) by mouth At Bedtime    Hyperlipidemia LDL goal <130       tretinoin 0.025 % cream    RETIN-A    45 g    Use nightly on entire face    Acne vulgaris       zolpidem 10 MG tablet    AMBIEN    30 tablet    Take 1 tablet (10 mg) by mouth nightly as needed for sleep    Insomnia, unspecified type

## 2018-10-30 ENCOUNTER — MYC MEDICAL ADVICE (OUTPATIENT)
Dept: FAMILY MEDICINE | Facility: CLINIC | Age: 53
End: 2018-10-30

## 2018-10-30 DIAGNOSIS — M79.672 LEFT FOOT PAIN: Primary | ICD-10-CM

## 2018-10-30 NOTE — TELEPHONE ENCOUNTER
"Route MyChart to PCP for PT referral please.  Referral pended.  Your note from 9/20/18 visit pasted below.    \"(K34.611) Left foot pain  (primary encounter diagnosis)  Comment:   Plan: meloxicam (MOBIC) 15 MG tablet        This appears to probably be metatarsalgia with some flexural tendinous overuse due to compensation for the metatarsal pain. Recommend PRICE and wear good tennis shoes, contrast baths. She declines an x ray and walking boot, wants to monitor. Refer to PT if persisting.\"      MyChart sent.   Edie Mckeon RN    "

## 2018-11-07 ENCOUNTER — OFFICE VISIT (OUTPATIENT)
Dept: PODIATRY | Facility: CLINIC | Age: 53
End: 2018-11-07
Payer: COMMERCIAL

## 2018-11-07 ENCOUNTER — RADIANT APPOINTMENT (OUTPATIENT)
Dept: GENERAL RADIOLOGY | Facility: CLINIC | Age: 53
End: 2018-11-07
Attending: PODIATRIST
Payer: COMMERCIAL

## 2018-11-07 VITALS
HEIGHT: 69 IN | BODY MASS INDEX: 21.18 KG/M2 | WEIGHT: 143 LBS | SYSTOLIC BLOOD PRESSURE: 112 MMHG | HEART RATE: 82 BPM | DIASTOLIC BLOOD PRESSURE: 76 MMHG

## 2018-11-07 DIAGNOSIS — Q66.70 CAVUS DEFORMITY OF FOOT: ICD-10-CM

## 2018-11-07 DIAGNOSIS — M79.672 LEFT FOOT PAIN: Primary | ICD-10-CM

## 2018-11-07 DIAGNOSIS — M19.072 PRIMARY OSTEOARTHRITIS OF LEFT FOOT: ICD-10-CM

## 2018-11-07 DIAGNOSIS — M79.672 LEFT FOOT PAIN: ICD-10-CM

## 2018-11-07 PROCEDURE — 73630 X-RAY EXAM OF FOOT: CPT | Mod: LT

## 2018-11-07 PROCEDURE — 99243 OFF/OP CNSLTJ NEW/EST LOW 30: CPT | Performed by: PODIATRIST

## 2018-11-07 ASSESSMENT — PAIN SCALES - GENERAL: PAINLEVEL: MILD PAIN (3)

## 2018-11-07 NOTE — PATIENT INSTRUCTIONS
Thank you for choosing [facility]!    Follow up as needed     DR. OZUNA'S CLINIC LOCATIONS     MONDAY  [place] TUESDAY & FRIDAY AM  [facility]   [address]   [address]   [place] [address] [place]EL [place] [address]   [phone]  [phone]  [phone]       WEDNESDAY  [place] SCHEDULE SURGERY: [phone]   [address] [facility] APPOINTMENTS: [phone]   [place] [address] BILLING QUESTIONS: [phone]   [phone]       PRICE THERAPY  Many aches and pains throughout the foot and ankle can be helped with many simple treatments. This is usually described as PRICE Therapy.      P - Protection - often times, inflammation/pain in the lower extremity is not able to improve simply because the areas involved are never allowed to rest. Every step we take can bother the problematic area. Protecting those areas is an important step in the healing process. This may involve a walking cast boot, a special insert/orthotic device, an ankle brace, or simply avoiding barefoot walking.    R - Rest - in addition to protecting the foot/ankle, resting is an important, but often times difficult, treatment option. Getting off your feet when they bother you, and specifically avoiding activities that cause pain/discomfort, are very beneficial to prevent, and treat, foot/ankle pain.      I - Ice - icing regularly can help to decrease inflammation and swelling in the foot, thus decreasing pain. Using an ice pack or a bag of frozen veggies works very well. Ice for 20 minutes multiple times per day as needed.  Do not place the ice directly on the skin as this can cause tissue damage.    C - Compression - using a compression wrap or an ACE wrap can help to decrease swelling, which can help to decrease pain. Wearing the wraps is generally not needed at night, but they should be worn on a regular basis when you are going to be on your  feet for prolonged periods as gravity tends to pull fluids down to your feet/ankles.    E - Elevation - elevating your lower extremities multiple times daily for 15-20 minutes can help to decrease swelling, which works well in decreasing pain levels.    NSAID/Tylenol - Anti-inflammatories like Aleve or ibuprofen, and/or a pain medication, such as Tylenol, can help to improve pain levels and get the issue resolved sooner rather than later. Anyone with liver issues should be careful with Tylenol, and anyone with high blood pressure or heart, stomach or kidney issues should be careful with anti-inflammatories. Please ask if you have questions about these medications, including dosage.  OVER THE COUNTER INSERTS    SuperFeet   Sofsole Fit Spenco   Power Step   Walk-Fit Arch Cradles     Most of these can be found at your local [employer], [employer], or online:    1.  https://www.[employer]/en-us/  2.  Https://[employer].Psy[employer]ic/  3.  Https://www.powersteps.com/    **A good high quality over the counter insert should cost around $40-$50            OSTEOARTHRITIS OF THE FOOT & ANKLE  Osteoarthritis is a condition characterized by the breakdown and eventual loss of cartilage in one or more joints. Cartilage (the connective tissue found at the end of the bones in the joints) protects and cushions the bones during movement. When cartilage deteriorates or is lost, symptoms develop that can restrict one s ability to easily perform daily activities.  Osteoarthritis is also known as degenerative arthritis, reflecting its nature to develop as part of the aging process. As the most common form of arthritis, osteoarthritis affects millions of Americans. Some people refer to osteoarthritis simply as arthritis, even though there are many different types of arthritis.  Osteoarthritis appears at various joints throughout the body, including the hands, feet, spine, hips, and knees. In the foot, the  disease most frequently occurs in the big toe, although it is also often found in the midfoot and ankle.  CAUSES  Osteoarthritis is considered a  wear and tear  disease because the cartilage in the joint wears down with repeated stress and use over time. As the cartilage deteriorates and gets thinner, the bones lose their protective covering and eventually may rub together, causing pain and inflammation of the joint.  An injury may also lead to osteoarthritis, although it may take months or years after the injury for the condition to develop. For example, osteoarthritis in the big toe is often caused by kicking or jamming the toe, or by dropping something on the toe. Osteoarthritis in the midfoot is often caused by dropping something on it, or by a sprain or fracture. In the ankle, osteoarthritis is usually caused by a fracture and occasionally by a severe sprain.  Sometimes osteoarthritis develops as a result of abnormal foot mechanics such as flat feet or high arches. A flat foot causes less stability in the ligaments (bands of tissue that connect bones), resulting in excessive strain on the joints, which can cause arthritis. A high arch is rigid and lacks mobility, causing a jamming of joints that creates an increased risk of arthritis.  SYMPTOMS  People with osteoarthritis in the foot or ankle experience, in varying degrees, one or more of the following: Pain and stiffness in the joint, swelling in or near the joint, or difficulty walking or bending the joint.   Some patients with osteoarthritis also develop a bone spur (a bony protrusion) at the affected joint. Shoe pressure may cause pain at the site of a bone spur, and in some cases blisters or calluses may form over its surface. Bone spurs can also limit the movement of the joint.    DIAGNOSIS  In diagnosing osteoarthritis, the foot and ankle surgeon will examine the foot thoroughly, looking for swelling in the joint, limited mobility, and pain with  movement. In some cases, deformity and/or enlargement (spur) of the joint may be noted. X-rays may be ordered to evaluate the extent of the disease.  NON-SURGICAL TREATMENT  To help relieve symptoms, the surgeon may begin treating osteoarthritis with one or more of the following non-surgical approaches:  Oral medications. Nonsteroidal anti-inflammatory drugs (NSAIDs), such as ibuprofen, are often helpful in reducing the inflammation and pain. Occasionally a prescription for a steroid medication is needed to adequately reduce symptoms.   Orthotic devices. Custom orthotic devices (shoe inserts) are often prescribed to provide support to improve the foot s mechanics or cushioning to help minimize pain.   Bracing. Bracing, which restricts motion and supports the joint, can reduce pain during walking and help prevent further deformity.   Immobilization. Protecting the foot from movement by wearing a cast or removable cast-boot may be necessary to allow the inflammation to resolve.   Steroid injections. In some cases, steroid injections are applied to the affected joint to deliver anti-inflammatory medication.   Physical therapy. Exercises to strengthen the muscles, especially when the osteoarthritis occurs in the ankle, may give the patient greater stability and help avoid injury that might worsen the condition.   DO I NEED SURGERY?  When osteoarthritis has progressed substantially or failed to improve with non-surgical treatment, surgery may be recommended. In advanced cases, surgery may be the only option. The goal of surgery is to decrease pain and improve function. The foot and ankle surgeon will consider a number of factors when selecting the procedure best suited to the patient s condition and lifestyle.  Cavus Foot (High-Arched Foot)  What Is Cavus Foot?   Cavus foot is a condition in which the foot has a very high arch. Because of this high arch, an excessive amount of weight is placed on the ball and heel of the  foot when walking or standing. Cavus foot can lead to a variety of signs and symptoms, such as pain and instability. It can develop at any age and can occur in one or both feet.  Causes  Cavus foot is often caused by a neurologic disorder or other medical condition, such as cerebral palsy, Charcot-Mary-Tooth disease, spina bifida, polio, muscular dystrophy or stroke. In other cases of cavus foot, the high arch may represent an inherited structural abnormality. An accurate diagnosis is important because the underlying cause of cavus foot largely determines its future course. If the high arch is due to a neurologic disorder or other medical condition, it is likely to progressively worsen. On the other hand, cases of cavus foot that do not result from neurologic disorders usually do not change in appearance.  Symptoms  The arch of a cavus foot will appear high even when standing. In addition, one or more of the following symptoms may be present:  Hammertoes (bent toes) or claw toes (toes clenched like a fist)   Calluses on the ball, side or heel of the foot   Pain when standing or walking   An unstable foot due to the heel tilting inward, which can lead to ankle sprains  Some people with cavus foot may also experience foot drop, a weakness of the muscles in the foot and ankle that results in dragging the foot when taking a step. Foot drop is usually a sign of an underlying neurologic condition.   Diagnosis  Diagnosis of cavus foot includes a review of the patient s family history. The foot and ankle surgeon examines the foot, looking for a high arch and possible calluses, hammertoes and claw toes. The foot is tested for muscle strength, and the patient s walking pattern and coordination are observed. If a neurologic condition appears to be present, the entire limb may be examined. The surgeon may also study the pattern of wear on the patient's shoes.  X-rays are sometimes ordered to further assess the condition. In  addition, the surgeon may refer the patient to a neurologist for a complete neurologic evaluation.  Nonsurgical Treatment  Nonsurgical treatment of cavus foot may include one or more of the following options:  Orthotic devices. Custom orthotic devices that fit into the shoe can be beneficial because they provide stability and cushioning to the foot.   Shoe modifications. High-topped shoes support the ankle, and shoes with heels a little wider on the bottom add stability.   Bracing. The surgeon may recommend a brace to help keep the foot and ankle stable. Bracing is also useful in managing foot drop.  When Is Surgery Needed?  If nonsurgical treatment fails to adequately relieve pain and improve stability, surgery may be needed to decrease pain, increase stability and compensate for weakness in the foot. The surgeon will choose the best surgical procedure or combination of procedures based on the patient s individual case. In some cases where an underlying neurologic problem exists, surgery may be needed again in the future due to the progression of the disorder.    [facility] LOCATIONS  [facility]  [address]  [place] [address]  Phone: [phone]  Fax: [phone] [facility]  [address]  [place] [address]  Phone: [phone]   Fax: [phone]   [facility]  [address]  [place] [address]  Phone: [phone]  Fax: [phone] [facility]  [address]  [place] [address]  Phone: [phone]   Fax: [phone]   [facility]   [address]  [place] [address]  Phone: [phone]   Fax: [phone] * Please call any location listed to make an appointment for a casting/fitting. Your referral was sent to their central office and they will all have the order on file.
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
## 2018-11-07 NOTE — MR AVS SNAPSHOT
After Visit Summary   11/7/2018    Isela Cartwright    MRN: [id number]           Patient Information     Date Of Birth          1965        Visit Information        Provider Department      11/7/2018 8:30 AM Hubert Ozuna DPM [facility]        Today's Diagnoses     Left foot pain    -  1    Primary osteoarthritis of left foot        Cavus deformity of foot          Care Instructions    Thank you for choosing [facility]!    Follow up as needed     DR. OZUNA'S CLINIC LOCATIONS     MONDAY  [place] TUESDAY & FRIDAY AM  [facility]   [address] [facility]   [address]   [place] [address] MANUEL Prado [address]   [phone]  [phone]  [phone]       WEDNESDAY  [place] SCHEDULE SURGERY: [phone]   [address] [facility] APPOINTMENTS: [phone]   [place] [address] BILLING QUESTIONS: [phone]   [phone]       PRICE THERAPY  Many aches and pains throughout the foot and ankle can be helped with many simple treatments. This is usually described as PRICE Therapy.      P - Protection - often times, inflammation/pain in the lower extremity is not able to improve simply because the areas involved are never allowed to rest. Every step we take can bother the problematic area. Protecting those areas is an important step in the healing process. This may involve a walking cast boot, a special insert/orthotic device, an ankle brace, or simply avoiding barefoot walking.    R - Rest - in addition to protecting the foot/ankle, resting is an important, but often times difficult, treatment option. Getting off your feet when they bother you, and specifically avoiding activities that cause pain/discomfort, are very beneficial to prevent, and treat, foot/ankle pain.      I - Ice - icing regularly can help to decrease inflammation and swelling in the foot, thus decreasing pain. Using an ice  pack or a bag of frozen veggies works very well. Ice for 20 minutes multiple times per day as needed.  Do not place the ice directly on the skin as this can cause tissue damage.    C - Compression - using a compression wrap or an ACE wrap can help to decrease swelling, which can help to decrease pain. Wearing the wraps is generally not needed at night, but they should be worn on a regular basis when you are going to be on your feet for prolonged periods as gravity tends to pull fluids down to your feet/ankles.    E - Elevation - elevating your lower extremities multiple times daily for 15-20 minutes can help to decrease swelling, which works well in decreasing pain levels.    NSAID/Tylenol - Anti-inflammatories like Aleve or ibuprofen, and/or a pain medication, such as Tylenol, can help to improve pain levels and get the issue resolved sooner rather than later. Anyone with liver issues should be careful with Tylenol, and anyone with high blood pressure or heart, stomach or kidney issues should be careful with anti-inflammatories. Please ask if you have questions about these medications, including dosage.  OVER THE COUNTER INSERTS    VideoLens Fit Spenco   Power Step   Walk-Fit Arch Cradles     Most of these can be found at your local [employer], [employer], [employer], or online:    1.  https://www.Passbox.[employer]/en-us/  2.  Https://ValetAnywhere.[employer]/  3.  Https://www.[employer]s.[employer]/    **A good high quality over the counter insert should cost around $40-$50            OSTEOARTHRITIS OF THE FOOT & ANKLE  Osteoarthritis is a condition characterized by the breakdown and eventual loss of cartilage in one or more joints. Cartilage (the connective tissue found at the end of the bones in the joints) protects and cushions the bones during movement. When cartilage deteriorates or is lost, symptoms develop that can restrict one s ability to easily perform daily activities.  Osteoarthritis is  also known as degenerative arthritis, reflecting its nature to develop as part of the aging process. As the most common form of arthritis, osteoarthritis affects millions of Americans. Some people refer to osteoarthritis simply as arthritis, even though there are many different types of arthritis.  Osteoarthritis appears at various joints throughout the body, including the hands, feet, spine, hips, and knees. In the foot, the disease most frequently occurs in the big toe, although it is also often found in the midfoot and ankle.  CAUSES  Osteoarthritis is considered a  wear and tear  disease because the cartilage in the joint wears down with repeated stress and use over time. As the cartilage deteriorates and gets thinner, the bones lose their protective covering and eventually may rub together, causing pain and inflammation of the joint.  An injury may also lead to osteoarthritis, although it may take months or years after the injury for the condition to develop. For example, osteoarthritis in the big toe is often caused by kicking or jamming the toe, or by dropping something on the toe. Osteoarthritis in the midfoot is often caused by dropping something on it, or by a sprain or fracture. In the ankle, osteoarthritis is usually caused by a fracture and occasionally by a severe sprain.  Sometimes osteoarthritis develops as a result of abnormal foot mechanics such as flat feet or high arches. A flat foot causes less stability in the ligaments (bands of tissue that connect bones), resulting in excessive strain on the joints, which can cause arthritis. A high arch is rigid and lacks mobility, causing a jamming of joints that creates an increased risk of arthritis.  SYMPTOMS  People with osteoarthritis in the foot or ankle experience, in varying degrees, one or more of the following: Pain and stiffness in the joint, swelling in or near the joint, or difficulty walking or bending the joint.   Some patients with  osteoarthritis also develop a bone spur (a bony protrusion) at the affected joint. Shoe pressure may cause pain at the site of a bone spur, and in some cases blisters or calluses may form over its surface. Bone spurs can also limit the movement of the joint.    DIAGNOSIS  In diagnosing osteoarthritis, the foot and ankle surgeon will examine the foot thoroughly, looking for swelling in the joint, limited mobility, and pain with movement. In some cases, deformity and/or enlargement (spur) of the joint may be noted. X-rays may be ordered to evaluate the extent of the disease.  NON-SURGICAL TREATMENT  To help relieve symptoms, the surgeon may begin treating osteoarthritis with one or more of the following non-surgical approaches:  Oral medications. Nonsteroidal anti-inflammatory drugs (NSAIDs), such as ibuprofen, are often helpful in reducing the inflammation and pain. Occasionally a prescription for a steroid medication is needed to adequately reduce symptoms.   Orthotic devices. Custom orthotic devices (shoe inserts) are often prescribed to provide support to improve the foot s mechanics or cushioning to help minimize pain.   Bracing. Bracing, which restricts motion and supports the joint, can reduce pain during walking and help prevent further deformity.   Immobilization. Protecting the foot from movement by wearing a cast or removable cast-boot may be necessary to allow the inflammation to resolve.   Steroid injections. In some cases, steroid injections are applied to the affected joint to deliver anti-inflammatory medication.   Physical therapy. Exercises to strengthen the muscles, especially when the osteoarthritis occurs in the ankle, may give the patient greater stability and help avoid injury that might worsen the condition.   DO I NEED SURGERY?  When osteoarthritis has progressed substantially or failed to improve with non-surgical treatment, surgery may be recommended. In advanced cases, surgery may be the  only option. The goal of surgery is to decrease pain and improve function. The foot and ankle surgeon will consider a number of factors when selecting the procedure best suited to the patient s condition and lifestyle.  Cavus Foot (High-Arched Foot)  What Is Cavus Foot?   Cavus foot is a condition in which the foot has a very high arch. Because of this high arch, an excessive amount of weight is placed on the ball and heel of the foot when walking or standing. Cavus foot can lead to a variety of signs and symptoms, such as pain and instability. It can develop at any age and can occur in one or both feet.  Causes  Cavus foot is often caused by a neurologic disorder or other medical condition, such as cerebral palsy, Charcot-Mary-Tooth disease, spina bifida, polio, muscular dystrophy or stroke. In other cases of cavus foot, the high arch may represent an inherited structural abnormality. An accurate diagnosis is important because the underlying cause of cavus foot largely determines its future course. If the high arch is due to a neurologic disorder or other medical condition, it is likely to progressively worsen. On the other hand, cases of cavus foot that do not result from neurologic disorders usually do not change in appearance.  Symptoms  The arch of a cavus foot will appear high even when standing. In addition, one or more of the following symptoms may be present:  Hammertoes (bent toes) or claw toes (toes clenched like a fist)   Calluses on the ball, side or heel of the foot   Pain when standing or walking   An unstable foot due to the heel tilting inward, which can lead to ankle sprains  Some people with cavus foot may also experience foot drop, a weakness of the muscles in the foot and ankle that results in dragging the foot when taking a step. Foot drop is usually a sign of an underlying neurologic condition.   Diagnosis  Diagnosis of cavus foot includes a review of the patient s family history. The foot  and ankle surgeon examines the foot, looking for a high arch and possible calluses, hammertoes and claw toes. The foot is tested for muscle strength, and the patient s walking pattern and coordination are observed. If a neurologic condition appears to be present, the entire limb may be examined. The surgeon may also study the pattern of wear on the patient's shoes.  X-rays are sometimes ordered to further assess the condition. In addition, the surgeon may refer the patient to a neurologist for a complete neurologic evaluation.  Nonsurgical Treatment  Nonsurgical treatment of cavus foot may include one or more of the following options:  Orthotic devices. Custom orthotic devices that fit into the shoe can be beneficial because they provide stability and cushioning to the foot.   Shoe modifications. High-topped shoes support the ankle, and shoes with heels a little wider on the bottom add stability.   Bracing. The surgeon may recommend a brace to help keep the foot and ankle stable. Bracing is also useful in managing foot drop.  When Is Surgery Needed?  If nonsurgical treatment fails to adequately relieve pain and improve stability, surgery may be needed to decrease pain, increase stability and compensate for weakness in the foot. The surgeon will choose the best surgical procedure or combination of procedures based on the patient s individual case. In some cases where an underlying neurologic problem exists, surgery may be needed again in the future due to the progression of the disorder.    [facility] LOCATIONS  [facility]  [address]  [place] [address]  Phone: [phone]  Fax: [phone] [facility]  [address] [place] [address] [address]  [place] [address]  Phone: [phone]   Fax: [phone]   [facility]  [address]  [place] [address]  Phone: [phone]  Fax: [phone] [facility]  [place]  [address]  [place] [address]  Phone: [phone]   Fax: [phone]   [facility]   [address]  [place] [address]  Phone: [phone]   Fax: [phone] * Please call any location listed to make an appointment for a casting/fitting. Your referral was sent to their central office and they will all have the order on file.               Follow-ups after your visit        Additional Services     ORTHOTICS REFERRAL       Please be aware that coverage of these services is subject to the terms and limitations of your health insurance plan.  Call member services at your health plan with any benefit or coverage questions.      Please bring the following to your appointment:    >>   Any x-rays, CTs or MRIs which have been performed.  Contact the facility where they were done to arrange for  prior to your scheduled appointment.  Any new CT, MRI or other procedures ordered by your specialist must be performed at a [facility] facility or coordinated by your clinic's referral office.    >>   List of current medications   >>   This referral request   >>   Any documents/labs given to you for this referral    ==This Referral PRINTS in the [facility] (ORTHOTICS & PROSTHETICS) Central scheduling office ==     The [facility] Central Scheduling staff will contact patient to arrange appointments. Central Scheduling Phone #:  MANUEL Santacruz  [phone]     Orthotics: Foot Orthotics                  Follow-up notes from your care team     Return if symptoms worsen or fail to improve.      Who to contact     If you have questions or need follow up information about today's clinic visit or your schedule please contact [facility] directly at [phone].  Normal or non-critical lab and imaging results will be communicated to you by MyChart, letter or phone within 4 business days after the clinic has received the results. If you do not hear  "from us within 7 days, please contact the clinic through [employer] or phone. If you have a critical or abnormal lab result, we will notify you by phone as soon as possible.  Submit refill requests through [employer] or call your pharmacy and they will forward the refill request to us. Please allow 3 business days for your refill to be completed.          Additional Information About Your Visit        [employer] Information     [employer] gives you secure access to your electronic health record. If you see a primary care provider, you can also send messages to your care team and make appointments. If you have questions, please call your primary care clinic.  If you do not have a primary care provider, please call [phone] and they will assist you.        Care EveryWhere ID     This is your Care EveryWhere ID. This could be used by other organizations to access your [facility] medical records  [id number]        Your Vitals Were     Pulse Height Last Period BMI (Body Mass Index)          82 5' 9\" (1.753 m) 07/13/2007 21.12 kg/m2         Blood Pressure from Last 3 Encounters:   11/07/18 112/76   09/20/18 122/72   09/10/18 122/72    Weight from Last 3 Encounters:   11/07/18 143 lb (64.9 kg)   09/20/18 143 lb 2 oz (64.9 kg)   09/10/18 144 lb 8 oz (65.5 kg)              We Performed the Following     ORTHOTICS REFERRAL        Primary Care Provider Office Phone # Fax #    Bandar Banuelos PA-C [phone]       [facility] [address]        Equal Access to Services     LINDA BELL : Hadii aad ku hadasho Soomaali, waaxda luqadaha, qaybta kaalmada adeegyada, vahid hanson. So [facility] [phone].    ATENCIÓN: Si habla español, tiene a chamorro disposición servicios gratuitos de asistencia lingüística. Llame al [phone].    We comply with applicable federal civil rights laws and [place] laws. We do not discriminate on the basis of race, color, national origin, age, " disability, sex, sexual orientation, or gender identity.            Thank you!     Thank you for choosing [facility]  for your care. Our goal is always to provide you with excellent care. Hearing back from our patients is one way we can continue to improve our services. Please take a few minutes to complete the written survey that you may receive in the mail after your visit with us. Thank you!             Your Updated Medication List - Protect others around you: Learn how to safely use, store and throw away your medicines at www.disposemymeds.org.          This list is accurate as of 11/7/18  8:53 AM.  Always use your most recent med list.                   Brand Name Dispense Instructions for use Diagnosis    CALCIUM 500 + D PO      Take 1 tablet by mouth daily.        meloxicam 15 MG tablet    MOBIC    14 tablet    Take 1 tablet (15 mg) by mouth daily    Left foot pain       simvastatin 20 MG tablet    ZOCOR    90 tablet    Take 1 tablet (20 mg) by mouth At Bedtime    Hyperlipidemia LDL goal <130       tretinoin 0.025 % cream    RETIN-A    45 g    Use nightly on entire face    Acne vulgaris       zolpidem 10 MG tablet    AMBIEN    30 tablet    Take 1 tablet (10 mg) by mouth nightly as needed for sleep    Insomnia, unspecified type

## 2018-11-07 NOTE — PROGRESS NOTES
"PATIENT HISTORY:  Isela Cartwright is a 53 year old female who presents to clinic for L dorsal foot pain with activity.  Intermittent for a few months.  She didn't have a fall in May but didn't recall any foot pain after that fall until a month later.  Pain is \"not bad\" today.  No treatments other than icing/heat, which helped minimally.      I was requested to see this patient for this issue by Deangelo Banuelos PA-C.    Review of Systems:  Patient denies fever, chills, rash, wound, stiffness, limping, numbness, heart burn, blood in stool, chest pain with activity, calf pain when walking, shortness of breath with activity, chronic cough, easy bleeding/bruising, swelling of ankles, excessive thirst, fatigue, depression.  Patient admits to weakness, anxiety.     PAST MEDICAL HISTORY:   Past Medical History:   Diagnosis Date     Recurrent UTI 11/19/2009     Tobacco use disorder     quit in 2008        PAST SURGICAL HISTORY:   Past Surgical History:   Procedure Laterality Date     COLONOSCOPY  2013     GENITOURINARY SURGERY  1988    burned uretha, chronic urinary tract infections before     SURGICAL HISTORY OF -       urethral dilation from UTI        MEDICATIONS:   Current Outpatient Prescriptions:      Calcium Carbonate-Vitamin D (CALCIUM 500 + D PO), Take 1 tablet by mouth daily., Disp: , Rfl:      meloxicam (MOBIC) 15 MG tablet, Take 1 tablet (15 mg) by mouth daily, Disp: 14 tablet, Rfl: 1     simvastatin (ZOCOR) 20 MG tablet, Take 1 tablet (20 mg) by mouth At Bedtime, Disp: 90 tablet, Rfl: 3     tretinoin (RETIN-A) 0.025 % cream, Use nightly on entire face, Disp: 45 g, Rfl: 3     zolpidem (AMBIEN) 10 MG tablet, Take 1 tablet (10 mg) by mouth nightly as needed for sleep, Disp: 30 tablet, Rfl: 2     ALLERGIES:    Allergies   Allergen Reactions     Amoxicillin Rash     Bees      Sulfa Drugs Rash     Tetracycline      Mild itching; not sure if this is true allergy        SOCIAL HISTORY:   Social History     Social History " "    Marital status: Single     Spouse name: N/A     Number of children: N/A     Years of education: N/A     Occupational History     Not on file.     Social History Main Topics     Smoking status: Former Smoker     Packs/day: 0.50     Years: 15.00     Types: Cigarettes     Quit date: 2008     Smokeless tobacco: Never Used     Alcohol use Yes      Comment: OCCAS     Drug use: No     Sexual activity: Not Currently     Partners: Male     Birth control/ protection: None     Other Topics Concern     Parent/Sibling W/ Cabg, Mi Or Angioplasty Before 65f 55m? No     Social History Narrative        FAMILY HISTORY:   Family History   Problem Relation Age of Onset     Hypertension Father      Hyperlipidemia Father      Cancer - colorectal Maternal Grandmother      Cerebrovascular Disease Maternal Grandmother      TIAs     HEART DISEASE Maternal Grandfather      cardiomegaly;  at 98     Breast Cancer Maternal Aunt      mom's half sister     Breast Cancer Cousin      paternal      Breast Cancer Cousin      C.A.D. No family hx of      no deaths at young age, bypass, etc.     Diabetes No family hx of      father is borderline        EXAM:Vitals: /76  Pulse 82  Ht 5' 9\" (1.753 m)  Wt 143 lb (64.9 kg)  LMP 2007  BMI 21.12 kg/m2  BMI= Body mass index is 21.12 kg/(m^2).    General appearance: Patient is alert and fully cooperative with history & exam.  No sign of distress is noted during the visit.     Psychiatric: Affect is pleasant & appropriate.  Patient appears motivated to improve health.     Respiratory: Breathing is regular & unlabored while sitting.     HEENT: Hearing is intact to spoken word.  Speech is clear.  No gross evidence of visual impairment that would impact ambulation.     Dermatologic: Skin is intact to L foot without significant lesions, rash or abrasion.  No paronychia or evidence of soft tissue infection is noted.     Vascular: DP & PT pulses are intact & regular on the left.  No " significant edema or varicosities noted.  CFT and skin temperature are normal to both lower extremities.     Neurologic: Lower extremity sensation is intact to light touch.  No evidence of weakness or contracture in the lower extremities.  No evidence of neuropathy.     Musculoskeletal: Difficult to localize pain.  Pt points to primarily over the dorsal TMTJs in a diffuse fashion, where some tenderness is noted with palpation at the 2nd TMTJ area.  Palpable spur noted here.  No pain with dorsiflexion of toes.  Patient is ambulatory without assistive device or brace.  No gross ankle deformity noted.  No foot or ankle joint effusion is noted.    XRs of L foot reviewed with pt.  Dorsal midfoot spurring noted with some degenerative changes, moreso at 2nd TMTJ area.  No definitive acute findings.     ASSESSMENT: L foot pain, DJD, cavus foot     PLAN:  Reviewed patient's chart in epic.  Discussed condition and treatment options including pros and cons.    Foot pain likely due to midfoot DJD, cavus foot.    Discussed this is a progressive issue.  Reviewed treatments including icing, NSAIDs, stiff soled shoes, orthotics.  Injection can be helpful if not improving.  Boot, MRI can be considered if not improving.  Pt will proceed with different shoes, orthotics.  otc discussed and custom ordered.  F/u 1-2 months prn.    Hubert Santizo DPM, FACFAS

## 2019-02-15 DIAGNOSIS — E78.5 HYPERLIPIDEMIA LDL GOAL <130: ICD-10-CM

## 2019-02-15 RX ORDER — SIMVASTATIN 20 MG
TABLET ORAL
Qty: 90 TABLET | Refills: 0 | Status: SHIPPED | OUTPATIENT
Start: 2019-02-15 | End: 2019-05-13

## 2019-02-15 NOTE — TELEPHONE ENCOUNTER
"Requested Prescriptions   Pending Prescriptions Disp Refills     simvastatin (ZOCOR) 20 MG tablet [Pharmacy Med Name: SIMVASTATIN 20 MG TABLET]  Last Written Prescription Date:  2/27/18  Last Fill Quantity: 90,  # refills: 3   Last office visit: 9/20/2018 with prescribing provider:  Vin   Future Office Visit:     90 tablet 3     Sig: TAKE 1 TABLET BY MOUTH AT BEDTIME    Statins Protocol Passed - 2/15/2019  1:10 AM       Passed - LDL on file in past 12 months    Recent Labs   Lab Test 02/22/18  0736   *            Passed - No abnormal creatine kinase in past 12 months    No lab results found.            Passed - Recent (12 mo) or future (30 days) visit within the authorizing provider's specialty    Patient had office visit in the last 12 months or has a visit in the next 30 days with authorizing provider or within the authorizing provider's specialty.  See \"Patient Info\" tab in inbasket, or \"Choose Columns\" in Meds & Orders section of the refill encounter.             Passed - Medication is active on med list       Passed - Patient is age 18 or older       Passed - No active pregnancy on record       Passed - No positive pregnancy test in past 12 months          "

## 2019-02-15 NOTE — TELEPHONE ENCOUNTER
Patient due for labs.  Medication is being filled for 1 time refill only due to:    Delores Wang, RN

## 2019-04-26 ENCOUNTER — MYC MEDICAL ADVICE (OUTPATIENT)
Dept: FAMILY MEDICINE | Facility: CLINIC | Age: 54
End: 2019-04-26

## 2019-04-26 NOTE — TELEPHONE ENCOUNTER
Lost My Name message sent to patient. In reviewing the chart it appears that there should still be refills for the 10 mg Ambien as there was no end date for the refills. Asked the patient if she has tried to refill the medication with pharmacy.     Gwendolyn Muse RN

## 2019-05-10 DIAGNOSIS — G47.00 INSOMNIA, UNSPECIFIED TYPE: ICD-10-CM

## 2019-05-13 ENCOUNTER — TELEPHONE (OUTPATIENT)
Dept: FAMILY MEDICINE | Facility: CLINIC | Age: 54
End: 2019-05-13

## 2019-05-13 DIAGNOSIS — E78.5 HYPERLIPIDEMIA LDL GOAL <130: ICD-10-CM

## 2019-05-13 NOTE — TELEPHONE ENCOUNTER
zolpidem (AMBIEN) 10 MG tablet      Last Written Prescription Date:  9/20/2018  Last Fill Quantity: 30 tablet,   # refills: 2  Last Office Visit: 9/20/2018  ANNA Higgins  Future Office visit:       Routing refill request to provider for review/approval because:  Drug not on the [facility], [facility] or [facility] refill protocol or controlled substance

## 2019-05-13 NOTE — TELEPHONE ENCOUNTER
"Requested Prescriptions   Pending Prescriptions Disp Refills     simvastatin (ZOCOR) 20 MG tablet [Pharmacy Med Name: SIMVASTATIN 20 MG TABLET]  Last Written Prescription Date:  2/15/2019  Last Fill Quantity: 90 tablet,  # refills: 0   Last office visit: 9/20/2018 with prescribing provider:  ANNA Banuelos   Future Office Visit:     90 tablet 0     Sig: TAKE 1 TABLET BY MOUTH EVERYDAY AT BEDTIME       Statins Protocol Failed - 5/13/2019  1:12 AM        Failed - LDL on file in past 12 months     Recent Labs   Lab Test 02/22/18  0736   *             Passed - No abnormal creatine kinase in past 12 months     No lab results found.             Passed - Recent (12 mo) or future (30 days) visit within the authorizing provider's specialty     Patient had office visit in the last 12 months or has a visit in the next 30 days with authorizing provider or within the authorizing provider's specialty.  See \"Patient Info\" tab in inbasket, or \"Choose Columns\" in Meds & Orders section of the refill encounter.              Passed - Medication is active on med list        Passed - Patient is age 18 or older        Passed - No active pregnancy on record        Passed - No positive pregnancy test in past 12 months          "

## 2019-05-14 RX ORDER — ZOLPIDEM TARTRATE 10 MG/1
10 TABLET ORAL
Qty: 30 TABLET | Refills: 5 | Status: SHIPPED | OUTPATIENT
Start: 2019-05-14 | End: 2023-09-13

## 2019-05-14 NOTE — TELEPHONE ENCOUNTER
Script faxed to [facility] pharmacy in [employer] in [place], Fax# [phone].    Thanks!  Arnav Dunham

## 2019-05-15 RX ORDER — SIMVASTATIN 20 MG
20 TABLET ORAL AT BEDTIME
Qty: 30 TABLET | Refills: 0 | Status: SHIPPED | OUTPATIENT
Start: 2019-05-15 | End: 2019-05-31

## 2019-05-15 NOTE — TELEPHONE ENCOUNTER
Sent 30 - need a cholesterol lab and a related visit. Could do a physical/preventive.    Thanks.  Bandar Banuelos, MPAS, PA-C

## 2019-05-16 NOTE — TELEPHONE ENCOUNTER
Called patient and left a VM message to schedule an Annual physical/ med check.    Isatu Rincon

## 2019-05-31 ENCOUNTER — OFFICE VISIT (OUTPATIENT)
Dept: FAMILY MEDICINE | Facility: CLINIC | Age: 54
End: 2019-05-31
Payer: COMMERCIAL

## 2019-05-31 VITALS
WEIGHT: 137.8 LBS | SYSTOLIC BLOOD PRESSURE: 116 MMHG | TEMPERATURE: 98.6 F | HEIGHT: 69 IN | BODY MASS INDEX: 20.41 KG/M2 | HEART RATE: 88 BPM | DIASTOLIC BLOOD PRESSURE: 74 MMHG

## 2019-05-31 DIAGNOSIS — E78.5 HYPERLIPIDEMIA LDL GOAL <130: Primary | ICD-10-CM

## 2019-05-31 DIAGNOSIS — G47.00 INSOMNIA, UNSPECIFIED TYPE: ICD-10-CM

## 2019-05-31 LAB
CHOLEST SERPL-MCNC: 215 MG/DL
HDLC SERPL-MCNC: 78 MG/DL
LDLC SERPL CALC-MCNC: 124 MG/DL
NONHDLC SERPL-MCNC: 137 MG/DL
TRIGL SERPL-MCNC: 64 MG/DL

## 2019-05-31 PROCEDURE — 80061 LIPID PANEL: CPT | Performed by: PHYSICIAN ASSISTANT

## 2019-05-31 PROCEDURE — 36415 COLL VENOUS BLD VENIPUNCTURE: CPT | Performed by: PHYSICIAN ASSISTANT

## 2019-05-31 PROCEDURE — 99213 OFFICE O/P EST LOW 20 MIN: CPT | Performed by: PHYSICIAN ASSISTANT

## 2019-05-31 RX ORDER — SIMVASTATIN 20 MG
20 TABLET ORAL AT BEDTIME
Qty: 90 TABLET | Refills: 3 | Status: SHIPPED | OUTPATIENT
Start: 2019-05-31 | End: 2020-06-09

## 2019-05-31 ASSESSMENT — MIFFLIN-ST. JEOR: SCORE: 1279.44

## 2019-05-31 NOTE — PROGRESS NOTES
Subjective     Isela Cartwright is a 54 year old female who presents to clinic today for the following health issues:    HPI   Hyperlipidemia Follow-Up      Are you having any of the following symptoms? (Select all that apply)  No complaints of shortness of breath, chest pain or pressure.  No increased sweating or nausea with activity.  No left-sided neck or arm pain.  No complaints of pain in calves when walking 1-2 blocks.    Are you regularly taking any medication or supplement to lower your cholesterol?   Yes- Simvastatin    Are you having muscle aches or other side effects that you think could be caused by your cholesterol lowering medication?  No        Amount of exercise or physical activity: 6-7 days/week for an average of 30-45 minutes    Problems taking medications regularly: No    Medication side effects: none    Diet: regular (no restrictions)        Patient Active Problem List   Diagnosis     Insomnia     Recurrent UTI     Hyperlipidemia LDL goal <160     Vaginal atrophy     Weight gain     Pain of finger of left hand     Past Surgical History:   Procedure Laterality Date     COLONOSCOPY       GENITOURINARY SURGERY      burned uretha, chronic urinary tract infections before     SURGICAL HISTORY OF -       urethral dilation from UTI       Social History     Tobacco Use     Smoking status: Former Smoker     Packs/day: 0.50     Years: 15.00     Pack years: 7.50     Types: Cigarettes     Last attempt to quit: 2008     Years since quittin.4     Smokeless tobacco: Never Used   Substance Use Topics     Alcohol use: Yes     Comment: OCCAS     Family History   Problem Relation Age of Onset     Hypertension Father      Hyperlipidemia Father      Cancer - colorectal Maternal Grandmother      Cerebrovascular Disease Maternal Grandmother         TIAs     Heart Disease Maternal Grandfather         cardiomegaly;  at 98     Breast Cancer Maternal Aunt         mom's half sister     Breast Cancer  "Cousin         paternal      Breast Cancer Cousin      C.A.D. No family hx of         no deaths at young age, bypass, etc.     Diabetes No family hx of         father is borderline           Reviewed and updated as needed this visit by Provider  Med Hx  Fam Hx         Review of Systems   ROS COMP: Constitutional, HEENT, cardiovascular, pulmonary, GI, , musculoskeletal, neuro, skin, endocrine and psych systems are negative, except as otherwise noted.      Objective    /74 (BP Location: Right arm, Patient Position: Chair, Cuff Size: Adult Regular)   Pulse 88   Temp 98.6  F (37  C) (Oral)   Ht 1.753 m (5' 9\")   Wt 62.5 kg (137 lb 12.8 oz)   LMP 07/13/2007   BMI 20.35 kg/m    Body mass index is 20.35 kg/m .  Physical Exam   GENERAL: healthy, alert and no distress  RESP: lungs clear to auscultation - no rales, rhonchi or wheezes  CV: regular rate and rhythm, normal S1 S2, no S3 or S4, no murmur, click or rub, no peripheral edema and peripheral pulses strong    Diagnostic Test Results:  Labs reviewed in Epic        Assessment & Plan     (E78.5) Hyperlipidemia LDL goal <130  (primary encounter diagnosis)  Comment: Refills   Plan: simvastatin (ZOCOR) 20 MG tablet, Lipid panel         reflex to direct LDL Fasting        The 10-year ASCVD risk score (Stiven CHAR Jr., et al., 2013) is: 1.8%    Values used to calculate the score:      Age: 54 years      Sex: Female      Is Non- : No      Diabetic: No      Tobacco smoker: No      Systolic Blood Pressure: 116 mmHg      Is BP treated: No      HDL Cholesterol: 70 mg/dL      Total Cholesterol: 242 mg/dL     Low risk - patient prefers to be on med - can continue.     (G47.00) Insomnia, unspecified type  Comment:   Plan: Stable. Rare use of zolpidem.        PALMER MOODY PA-C  [facility]        "

## 2019-06-04 ENCOUNTER — ALLIED HEALTH/NURSE VISIT (OUTPATIENT)
Dept: NURSING | Facility: CLINIC | Age: 54
End: 2019-06-04
Payer: COMMERCIAL

## 2019-06-04 DIAGNOSIS — Z23 NEED FOR TDAP VACCINATION: Primary | ICD-10-CM

## 2019-06-04 PROCEDURE — 90471 IMMUNIZATION ADMIN: CPT

## 2019-06-04 PROCEDURE — 90715 TDAP VACCINE 7 YRS/> IM: CPT

## 2019-06-04 PROCEDURE — 99207 ZZC NO CHARGE NURSE ONLY: CPT

## 2019-06-04 NOTE — PROGRESS NOTES
Prior to injection, verified patient identity using patient's name and date of birth.  Due to injection administration, patient instructed to remain in clinic for 15 minutes  afterwards, and to report any adverse reaction to me immediately.    Screening Questionnaire for Adult Immunization    Are you sick today?   No   Do you have allergies to medications, food, a vaccine component or latex?   Yes   Have you ever had a serious reaction after receiving a vaccination?   No   Do you have a long-term health problem with heart disease, lung disease, asthma, kidney disease, metabolic disease (e.g. diabetes), anemia, or other blood disorder?   No   Do you have cancer, leukemia, HIV/AIDS, or any other immune system problem?   No   In the past 3 months, have you taken medications that affect  your immune system, such as prednisone, other steroids, or anticancer drugs; drugs for the treatment of rheumatoid arthritis, Crohn s disease, or psoriasis; or have you had radiation treatments?   No   Have you had a seizure, or a brain or other nervous system problem?   No   During the past year, have you received a transfusion of blood or blood     products, or been given immune (gamma) globulin or antiviral drug?   No   For women: Are you pregnant or is there a chance you could become        pregnant during the next month?   No   Have you received any vaccinations in the past 4 weeks?   No        Per orders of Bandar Banuelos PA-C, injection of Tdap given by Scarlett Addison. Patient instructed to remain in clinic for 15 minutes afterwards, and to report any adverse reaction to me immediately.       Screening performed by Scarlett Addison on 6/4/2019 at 4:07 PM.

## 2019-06-05 ENCOUNTER — NURSE TRIAGE (OUTPATIENT)
Dept: FAMILY MEDICINE | Facility: CLINIC | Age: 54
End: 2019-06-05

## 2019-06-05 ENCOUNTER — TELEPHONE (OUTPATIENT)
Dept: FAMILY MEDICINE | Facility: CLINIC | Age: 54
End: 2019-06-05

## 2019-06-05 NOTE — TELEPHONE ENCOUNTER
Additional Information    Negative: Sounds like a life-threatening emergency to the triager    Negative: Possible contact with poison ivy or oak    Negative: Insect bite(s) suspected    Negative: Athlete's Foot suspected (i.e., itchy rash between the toes)    Negative: Jock Itch suspected (i.e., itchy rash on inner thighs near genital area)    Negative: Wound infection suspected (i.e., pain, spreading redness, or pus; in a cut, puncture, scrape or sutured wound)    Negative: Rash of external female genital area (vulva)    Negative: Rash of penis or scrotum    Negative: Small spot, skin growth, or mole    Negative: Fever and localized purple or blood-colored spots or dots that are not from injury or friction    Negative: Fever and localized rash is very painful    Negative: Patient sounds very sick or weak to the triager    Negative: Looks like a boil, infected sore, deep ulcer, or other infected rash (spreading redness, pus)    Negative: Painful rash with multiple small blisters grouped together (i.e., dermatomal distribution or 'band' or 'stripe')    Negative: Localized rash is very painful (no fever)    Negative: Localized purple or blood-colored spots or dots that are not from injury or friction (no fever)    Negative: Lyme disease suspected (e.g., bull's-eye rash or tick bite / exposure)    Negative: Patient wants to be seen    Negative: Tender bumps in armpits    Negative: Pimples (localized) and no improvement after using CARE ADVICE    Negative: SEVERE local itching persists after 2 days of steroid cream    Negative: Applying cream or ointment and it causes severe itch, burning, or pain    Negative: Localized rash present > 7 days    Negative: Red, moist, irritated area between skin folds (or under larger breasts)    Negative: Mild localized rash    Negative: Pimples (localized)    Negative: Redness or itching where jewelry (or metal) touches skin and jewelry contains nickel    Answer Assessment - Initial  "Assessment Questions  1. APPEARANCE of RASH: \"Describe the rash.\"       [place] size of nickel   2. LOCATION: \"Where is the rash located?\"       Left upper arm where Tdap immunization was done yesterday  3. NUMBER: \"How many spots are there?\"       1  4. SIZE: \"How big are the spots?\" (Inches, centimeters or compare to size of a coin)       Nickel sized  5. ONSET: \"When did the rash start?\"       Noticed this morning after shower  6. ITCHING: \"Does the rash itch?\" If so, ask: \"How bad is the itch?\"  (Scale 1-10; or mild, moderate, severe)      No itching  7. PAIN: \"Does the rash hurt?\" If so, ask: \"How bad is the pain?\"  (Scale 1-10; or mild, moderate, severe)      No pain  8. OTHER SYMPTOMS: \"Do you have any other symptoms?\" (e.g., fever)      No other symptoms  9. PREGNANCY: \"Is there any chance you are pregnant?\" \"When was your last menstrual period?\"      Not pregnant    Protocols used: RASH OR REDNESS - [id number]      "

## 2019-06-05 NOTE — TELEPHONE ENCOUNTER
Reason for call:  Patient reporting a symptom    Symptom or request: red spot around tetanus shot    Duration (how long have symptoms been present): this morning    Have you been treated for this before? No    Additional comments: Patient saw little red spot this morning    Phone Number patient can be reached at:  Work number on file:  [phone] (work)    Best Time:  Any time    Can we leave a detailed message on this number:  YES     Thank you!  Sophie LUIS  Patient Representative  [facility]        Call taken on 6/5/2019 at 8:09 AM by Sophie Baeza

## 2019-07-18 ENCOUNTER — OFFICE VISIT (OUTPATIENT)
Dept: FAMILY MEDICINE | Facility: CLINIC | Age: 54
End: 2019-07-18
Payer: COMMERCIAL

## 2019-07-18 VITALS
BODY MASS INDEX: 20.67 KG/M2 | TEMPERATURE: 97.6 F | HEART RATE: 82 BPM | WEIGHT: 140 LBS | SYSTOLIC BLOOD PRESSURE: 110 MMHG | DIASTOLIC BLOOD PRESSURE: 75 MMHG

## 2019-07-18 DIAGNOSIS — N30.00 ACUTE CYSTITIS WITHOUT HEMATURIA: Primary | ICD-10-CM

## 2019-07-18 DIAGNOSIS — R82.90 NONSPECIFIC FINDING ON EXAMINATION OF URINE: ICD-10-CM

## 2019-07-18 DIAGNOSIS — R30.9 PAINFUL URINATION: ICD-10-CM

## 2019-07-18 LAB
ALBUMIN UR-MCNC: NEGATIVE MG/DL
APPEARANCE UR: ABNORMAL
BACTERIA #/AREA URNS HPF: ABNORMAL /HPF
BILIRUB UR QL STRIP: NEGATIVE
COLOR UR AUTO: YELLOW
GLUCOSE UR STRIP-MCNC: NEGATIVE MG/DL
HGB UR QL STRIP: NEGATIVE
KETONES UR STRIP-MCNC: NEGATIVE MG/DL
LEUKOCYTE ESTERASE UR QL STRIP: ABNORMAL
NITRATE UR QL: NEGATIVE
NON-SQ EPI CELLS #/AREA URNS LPF: ABNORMAL /LPF
PH UR STRIP: 6 PH (ref 5–7)
RBC #/AREA URNS AUTO: ABNORMAL /HPF
SOURCE: ABNORMAL
SP GR UR STRIP: 1.02 (ref 1–1.03)
UROBILINOGEN UR STRIP-ACNC: 1 EU/DL (ref 0.2–1)
WBC #/AREA URNS AUTO: ABNORMAL /HPF

## 2019-07-18 PROCEDURE — 87088 URINE BACTERIA CULTURE: CPT | Performed by: PHYSICIAN ASSISTANT

## 2019-07-18 PROCEDURE — 87186 SC STD MICRODIL/AGAR DIL: CPT | Performed by: PHYSICIAN ASSISTANT

## 2019-07-18 PROCEDURE — 87086 URINE CULTURE/COLONY COUNT: CPT | Performed by: PHYSICIAN ASSISTANT

## 2019-07-18 PROCEDURE — 81001 URINALYSIS AUTO W/SCOPE: CPT | Performed by: PHYSICIAN ASSISTANT

## 2019-07-18 PROCEDURE — 99213 OFFICE O/P EST LOW 20 MIN: CPT | Performed by: PHYSICIAN ASSISTANT

## 2019-07-18 RX ORDER — NITROFURANTOIN 25; 75 MG/1; MG/1
100 CAPSULE ORAL 2 TIMES DAILY
Qty: 10 CAPSULE | Refills: 0 | Status: SHIPPED | OUTPATIENT
Start: 2019-07-18 | End: 2019-11-26

## 2019-07-18 NOTE — PROGRESS NOTES
Subjective     Isela Cartwright is a 54 year old female who presents to clinic today for the following health issues:    HPI   URINARY TRACT SYMPTOMS  Onset: x1 day- this am when she woke up    Description:   Painful urination (Dysuria): YES- more urgency.   Blood in urine (Hematuria): no   Delay in urine (Hesitency): no     Intensity: mild, moderate    Progression of Symptoms:  same    Accompanying Signs & Symptoms:  Fever/chills: no   Flank pain no   Nausea and vomiting: no   Any vaginal symptoms: none  Abdominal/Pelvic Pain: no     History:   History of frequent UTI's: YES- has been awhile since her last.   History of kidney stones: no   Sexually Active: no   Possibility of pregnancy: No    Precipitating factors:       Therapies Tried and outcome: OTC Cranberry pill          Reviewed and updated as needed this visit by Provider  Tobacco  Allergies  Meds  Problems  Med Hx  Surg Hx  Fam Hx         Review of Systems   ROS COMP: Constitutional, HEENT, cardiovascular, pulmonary, gi and gu systems are negative, except as otherwise noted.      Objective    /75 (BP Location: Left arm, Patient Position: Chair, Cuff Size: Adult Regular)   Pulse 82   Temp 97.6  F (36.4  C) (Oral)   Wt 63.5 kg (140 lb)   LMP 07/13/2007   Breastfeeding? No   BMI 20.67 kg/m    Body mass index is 20.67 kg/m .  Physical Exam   GENERAL: healthy, alert and no distress    Diagnostic Test Results:  Results for orders placed or performed in visit on 07/18/19 (from the past 24 hour(s))   UA reflex to Microscopic and Culture   Result Value Ref Range    Color Urine Yellow     Appearance Urine Slightly Cloudy     Glucose Urine Negative NEG^Negative mg/dL    Bilirubin Urine Negative NEG^Negative    Ketones Urine Negative NEG^Negative mg/dL    Specific Gravity Urine 1.020 1.003 - 1.035    Blood Urine Negative NEG^Negative    pH Urine 6.0 5.0 - 7.0 pH    Protein Albumin Urine Negative NEG^Negative mg/dL    Urobilinogen Urine 1.0 0.2 - 1.0  EU/dL    Nitrite Urine Negative NEG^Negative    Leukocyte Esterase Urine Small (A) NEG^Negative    Source Midstream Urine    Urine Microscopic   Result Value Ref Range    WBC Urine  (A) OTO5^0 - 5 /HPF    RBC Urine O - 2 OTO2^O - 2 /HPF    Squamous Epithelial /LPF Urine Few FEW^Few /LPF    Bacteria Urine Few (A) NEG^Negative /HPF           Assessment & Plan       ICD-10-CM    1. Acute cystitis without hematuria N30.00 nitroFURantoin macrocrystal-monohydrate (MACROBID) 100 MG capsule   2. Painful urination R30.9 UA reflex to Microscopic and Culture     Urine Microscopic   3. Nonspecific finding on examination of urine R82.90 Urine Culture Aerobic Bacterial      Will treat with macrobid. Increase fluids. Cranberry pills ok to use.   Await culture results.           No follow-ups on file.    Gissell Espinoza PA-C  [facility]

## 2019-07-19 LAB
BACTERIA SPEC CULT: ABNORMAL
SPECIMEN SOURCE: ABNORMAL

## 2019-07-22 NOTE — RESULT ENCOUNTER NOTE
Isela,     Your urine did show an infection. Please make sure you finished all the antibiotics.   Gissell Espinoza PA-C

## 2019-08-06 DIAGNOSIS — Z12.31 VISIT FOR SCREENING MAMMOGRAM: ICD-10-CM

## 2019-09-30 ENCOUNTER — HEALTH MAINTENANCE LETTER (OUTPATIENT)
Age: 54
End: 2019-09-30

## 2019-11-25 NOTE — PROGRESS NOTES
"Chief Complaint   Patient presents with     Physical       Initial /83 (BP Location: Left arm, Patient Position: Sitting, Cuff Size: Adult Regular)   Pulse 75   Temp 98.1  F (36.7  C) (Oral)   Ht 1.746 m (5' 8.75\")   Wt 64.6 kg (142 lb 6.4 oz)   LMP 2007   SpO2 99%   BMI 21.18 kg/m   Estimated body mass index is 21.18 kg/m  as calculated from the following:    Height as of this encounter: 1.746 m (5' 8.75\").    Weight as of this encounter: 64.6 kg (142 lb 6.4 oz).  BP completed using cuff size: regular    Questioned patient about current smoking habits.  Pt. quit smoking some time ago.          The following  Due: pap smear      The following patient reported/Care Every where data was sent to:  P ABSTRACT QUALITY INITIATIVES [50378]  n/a      patient has appointment for today and orders have been placed              Isela is a 54 year old  who presents for annual exam.   Postmenopausal since 41.  She is having hot flashes, mild and night sweats. No vaginal bleeding noted.   Overall no complaints today.  Tolerating menopausal symptoms with out a problem.    Weaned off vagifem.  Rare UTI's.    Lives alone, has a long term partner but not .   Eats very healthy.   Weight has been stable.   Follows with ANNA Banuelos for cholesterol management  Up to date of mammogram and colonoscopy.  Due for pap today.   No children.     Besides routine health maintenance, she has no other health concerns today .    Wt Readings from Last 4 Encounters:   19 64.6 kg (142 lb 6.4 oz)   19 63.5 kg (140 lb)   19 62.5 kg (137 lb 12.8 oz)   18 64.9 kg (143 lb)      GYNECOLOGIC HISTORY:  Menarche: around 14   Age at first intercourse: 16 Number of lifetime partners: less than 6  She is not sexually active with male partner(s) and she is currently in monogamous relationship with boyfriend.    History sexually transmitted infections:No STD history  STI testing offered?  " Declined  Estrogen replacement therapy: No  JOHN exposure: No    History of abnormal Pap smear: YES - updated in Problem List and Health Maintenance accordingly  Family history of breast CA: Yes (Please explain): paternal cousin, half aunt (possible from the other side of her family).  Family history of uterine/ovarian CA: Unknown, possible maternal grandmother, had hysterectomy and lived until she was 100 years old.   Family history of colon CA: No    HEALTH MAINTENANCE:  Cholesterol: (No components found for: CHOL2 ) History of abnormal lipids: Yes  Mammo: 2019 . History of abnormal Mammo: No  Regular Self Breast Exams: No  Colonoscopy: 8/15/2013 History of abnormal Colonoscopy: No  Dexa: 2010 History of abnormal Dexa: No  Calcium/Vitamin D intake: source:  dairy, dietary supplement(s), green leafy veggies Adequate? Yes  TSH: (No components found for: TSH1 )  Pap; (  Lab Results   Component Value Date    PAP NIL 2015    PAP NIL 07/10/2012    )    HISTORY:  OB History    Para Term  AB Living   1 0 0 0 1 0   SAB TAB Ectopic Multiple Live Births   0 1 0 0 0      # Outcome Date GA Lbr Piyush/2nd Weight Sex Delivery Anes PTL Lv   1 TAB              Past Medical History:   Diagnosis Date     Recurrent UTI 2009     Tobacco use disorder     quit in      Past Surgical History:   Procedure Laterality Date     COLONOSCOPY       GENITOURINARY SURGERY      burned uretha, chronic urinary tract infections before     SURGICAL HISTORY OF -       urethral dilation from UTI     Family History   Problem Relation Age of Onset     Hypertension Father      Hyperlipidemia Father      Cancer - colorectal Maternal Grandmother      Cerebrovascular Disease Maternal Grandmother         TIAs     Heart Disease Maternal Grandfather         cardiomegaly;  at 98     Breast Cancer Maternal Aunt         mom's half sister     Breast Cancer Cousin         paternal      Breast Cancer Cousin      C.A.D. No  family hx of         no deaths at young age, bypass, etc.     Diabetes No family hx of         father is borderline     Social History     Socioeconomic History     Marital status: Single     Spouse name: Not on file     Number of children: Not on file     Years of education: Not on file     Highest education level: Not on file   Occupational History     Not on file   Social Needs     Financial resource strain: Not on file     Food insecurity:     Worry: Not on file     Inability: Not on file     Transportation needs:     Medical: Not on file     Non-medical: Not on file   Tobacco Use     Smoking status: Former Smoker     Packs/day: 0.50     Years: 15.00     Pack years: 7.50     Types: Cigarettes     Last attempt to quit: 2008     Years since quittin.8     Smokeless tobacco: Never Used   Substance and Sexual Activity     Alcohol use: Yes     Comment: OCCAS     Drug use: No     Sexual activity: Not Currently     Partners: Male     Birth control/protection: None   Lifestyle     Physical activity:     Days per week: Not on file     Minutes per session: Not on file     Stress: Not on file   Relationships     Social connections:     Talks on phone: Not on file     Gets together: Not on file     Attends [employer] service: Not on file     Active member of club or organization: Not on file     Attends meetings of clubs or organizations: Not on file     Relationship status: Not on file     Intimate partner violence:     Fear of current or ex partner: Not on file     Emotionally abused: Not on file     Physically abused: Not on file     Forced sexual activity: Not on file   Other Topics Concern     Parent/sibling w/ CABG, MI or angioplasty before 65F 55M? No   Social History Narrative     Not on file       Current Outpatient Medications:      simvastatin (ZOCOR) 20 MG tablet, Take 1 tablet (20 mg) by mouth At Bedtime, Disp: 90 tablet, Rfl: 3     tretinoin (RETIN-A) 0.025 % cream, Use nightly on entire face, Disp: 45  "g, Rfl: 3     zolpidem (AMBIEN) 10 MG tablet, Take 1 tablet (10 mg) by mouth nightly as needed for sleep, Disp: 30 tablet, Rfl: 5     Calcium Carbonate-Vitamin D (CALCIUM 500 + D PO), Take 1 tablet by mouth daily., Disp: , Rfl:      Allergies   Allergen Reactions     Amoxicillin Rash     Bees      Sulfa Drugs Rash     Tetracycline      Mild itching; not sure if this is true allergy       Past medical, surgical, social and family history were reviewed and updated in EPIC.    ROS:   C:       NEGATIVE for fever, chills, change in weight  I:         NEGATIVE for worrisome rashes, moles or lesions  E:       NEGATIVE for vision changes or irritation  E/M:   NEGATIVE for ear, mouth and throat problems  R:       NEGATIVE for significant cough or SOB  CV:     NEGATIVE for chest pain, palpitations or peripheral edema  GI:      NEGATIVE for nausea, abdominal pain, heartburn, or change in bowel habits  :    NEGATIVE for frequency, dysuria, hematuria, vaginal discharge, or bleeding  M:       NEGATIVE for significant arthralgias or myalgia  N:       NEGATIVE for weakness, dizziness or paresthesias  E:       NEGATIVE for temperature intolerance, skin/hair changes  P:       NEGATIVE for changes in mood or affect    EXAM:  /83 (BP Location: Left arm, Patient Position: Sitting, Cuff Size: Adult Regular)   Pulse 75   Temp 98.1  F (36.7  C) (Oral)   Ht 1.746 m (5' 8.75\")   Wt 64.6 kg (142 lb 6.4 oz)   LMP 07/13/2007   SpO2 99%   BMI 21.18 kg/m     BMI: Body mass index is 21.18 kg/m .  Constitutional: healthy, alert and no distress  Head: Normocephalic. No masses, lesions, tenderness or abnormalities  Neck: Neck supple. Trachea midline. No adenopathy. Thyroid symmetric, normal size.   Cardiovascular: RRR.   Respiratory: Negative.   Breast: No nodularity, asymmetry or nipple discharge bilaterally.  Gastrointestinal: Abdomen soft, non-tender, non-distended. No masses, organomegaly  :  Vulva:  No external lesions, normal " female hair distribution, no inguinal adenopathy.    Urethra:  Midline, non-tender, well supported, no discharge  Vagina:  Atrophic, no abnormal discharge, no lesions  BME limited by vaginal stenosis.  Use narrow speculum. Can only admit one digit to do BME so exam is suboptimal but none the less, there is nothing on exam that feels abnormal.   Uterus:  Normal size, non-tender   Ovaries:  No masses appreciated, non-tender   Rectal Exam: deferred  Musculoskeletal: extremities normal  Skin: no suspicious lesions or rashes  Psychiatric: Affect appropriate, cooperative,mentation appears normal.     COUNSELING:   Reviewed preventive health counseling, as reflected in patient instructions       Regular exercise       Healthy diet/nutrition       Vision screening       Alcohol Use       Folic Acid Counseling       Osteoporosis Prevention/Bone Health       Colon cancer screening       ([place])menopause management   reports that she quit smoking about 11 years ago. Her smoking use included cigarettes. She has a 7.50 pack-year smoking history. She has never used smokeless tobacco.    Body mass index is 21.18 kg/m .      FRAX Risk Assessment  ASSESSMENT:  54 year old  with satisfactory annual exam  (Z00.00) Routine general medical examination at a health care facility  (primary encounter diagnosis)  Comment:   Plan: HPV High Risk Types DNA Cervical, Pap imaged         thin layer screen with HPV - recommended age 30        - 65 years (select HPV order below), TSH with         free T4 reflex, Comprehensive metabolic panel,         Hemoglobin, Vitamin D Deficiency            (Z12.4) Screening for cervical cancer  Comment:     Plan: HPV High Risk Types DNA Cervical, Pap imaged         thin layer screen with HPV - recommended age 30        - 65 years (select HPV order below)          Elizabeth Wall MD

## 2019-11-26 ENCOUNTER — OFFICE VISIT (OUTPATIENT)
Dept: OBGYN | Facility: CLINIC | Age: 54
End: 2019-11-26
Payer: COMMERCIAL

## 2019-11-26 VITALS
BODY MASS INDEX: 21.09 KG/M2 | SYSTOLIC BLOOD PRESSURE: 133 MMHG | HEART RATE: 75 BPM | DIASTOLIC BLOOD PRESSURE: 83 MMHG | TEMPERATURE: 98.1 F | HEIGHT: 69 IN | OXYGEN SATURATION: 99 % | WEIGHT: 142.4 LBS

## 2019-11-26 DIAGNOSIS — Z00.00 ROUTINE GENERAL MEDICAL EXAMINATION AT A HEALTH CARE FACILITY: Primary | ICD-10-CM

## 2019-11-26 DIAGNOSIS — Z12.4 SCREENING FOR CERVICAL CANCER: ICD-10-CM

## 2019-11-26 LAB — HGB BLD-MCNC: 12.7 G/DL (ref 11.7–15.7)

## 2019-11-26 PROCEDURE — 84443 ASSAY THYROID STIM HORMONE: CPT | Performed by: OBSTETRICS & GYNECOLOGY

## 2019-11-26 PROCEDURE — 80053 COMPREHEN METABOLIC PANEL: CPT | Performed by: OBSTETRICS & GYNECOLOGY

## 2019-11-26 PROCEDURE — 85018 HEMOGLOBIN: CPT | Performed by: OBSTETRICS & GYNECOLOGY

## 2019-11-26 PROCEDURE — 36415 COLL VENOUS BLD VENIPUNCTURE: CPT | Performed by: OBSTETRICS & GYNECOLOGY

## 2019-11-26 PROCEDURE — 82306 VITAMIN D 25 HYDROXY: CPT | Performed by: OBSTETRICS & GYNECOLOGY

## 2019-11-26 PROCEDURE — 87624 HPV HI-RISK TYP POOLED RSLT: CPT | Performed by: OBSTETRICS & GYNECOLOGY

## 2019-11-26 PROCEDURE — 99386 PREV VISIT NEW AGE 40-64: CPT | Performed by: OBSTETRICS & GYNECOLOGY

## 2019-11-26 PROCEDURE — G0145 SCR C/V CYTO,THINLAYER,RESCR: HCPCS | Performed by: OBSTETRICS & GYNECOLOGY

## 2019-11-26 ASSESSMENT — MIFFLIN-ST. JEOR: SCORE: 1296.33

## 2019-11-27 LAB
ALBUMIN SERPL-MCNC: 4.2 G/DL (ref 3.4–5)
ALP SERPL-CCNC: 72 U/L (ref 40–150)
ALT SERPL W P-5'-P-CCNC: 23 U/L (ref 0–50)
ANION GAP SERPL CALCULATED.3IONS-SCNC: 5 MMOL/L (ref 3–14)
AST SERPL W P-5'-P-CCNC: 19 U/L (ref 0–45)
BILIRUB SERPL-MCNC: 0.6 MG/DL (ref 0.2–1.3)
BUN SERPL-MCNC: 21 MG/DL (ref 7–30)
CALCIUM SERPL-MCNC: 9 MG/DL (ref 8.5–10.1)
CHLORIDE SERPL-SCNC: 107 MMOL/L (ref 94–109)
CO2 SERPL-SCNC: 26 MMOL/L (ref 20–32)
CREAT SERPL-MCNC: 0.63 MG/DL (ref 0.52–1.04)
DEPRECATED CALCIDIOL+CALCIFEROL SERPL-MC: 34 UG/L (ref 20–75)
GFR SERPL CREATININE-BSD FRML MDRD: >90 ML/MIN/{1.73_M2}
GLUCOSE SERPL-MCNC: 78 MG/DL (ref 70–99)
POTASSIUM SERPL-SCNC: 4.5 MMOL/L (ref 3.4–5.3)
PROT SERPL-MCNC: 7.1 G/DL (ref 6.8–8.8)
SODIUM SERPL-SCNC: 138 MMOL/L (ref 133–144)
TSH SERPL DL<=0.005 MIU/L-ACNC: 1.78 MU/L (ref 0.4–4)

## 2019-12-02 LAB
COPATH REPORT: NORMAL
PAP: NORMAL

## 2019-12-03 LAB
FINAL DIAGNOSIS: NORMAL
HPV HR 12 DNA CVX QL NAA+PROBE: NEGATIVE
HPV16 DNA SPEC QL NAA+PROBE: NEGATIVE
HPV18 DNA SPEC QL NAA+PROBE: NEGATIVE
SPECIMEN DESCRIPTION: NORMAL
SPECIMEN SOURCE CVX/VAG CYTO: NORMAL

## 2020-06-07 DIAGNOSIS — E78.5 HYPERLIPIDEMIA LDL GOAL <130: ICD-10-CM

## 2020-06-09 ENCOUNTER — MYC REFILL (OUTPATIENT)
Dept: FAMILY MEDICINE | Facility: CLINIC | Age: 55
End: 2020-06-09

## 2020-06-09 ENCOUNTER — MYC REFILL (OUTPATIENT)
Dept: DERMATOLOGY | Facility: CLINIC | Age: 55
End: 2020-06-09

## 2020-06-09 DIAGNOSIS — E78.5 HYPERLIPIDEMIA LDL GOAL <130: ICD-10-CM

## 2020-06-09 DIAGNOSIS — L70.0 ACNE VULGARIS: ICD-10-CM

## 2020-06-09 RX ORDER — TRETINOIN 0.25 MG/G
CREAM TOPICAL
Qty: 45 G | Refills: 3 | Status: CANCELLED | OUTPATIENT
Start: 2020-06-09

## 2020-06-10 RX ORDER — SIMVASTATIN 20 MG
20 TABLET ORAL AT BEDTIME
Qty: 90 TABLET | Refills: 3 | Status: SHIPPED | OUTPATIENT
Start: 2020-06-10 | End: 2021-05-27

## 2020-06-10 RX ORDER — SIMVASTATIN 20 MG
20 TABLET ORAL AT BEDTIME
Qty: 90 TABLET | Refills: 3 | OUTPATIENT
Start: 2020-06-10

## 2020-06-10 NOTE — TELEPHONE ENCOUNTER
Routing refill request to provider for review/approval because:  Labs not current:  RANJITH Martinez RN

## 2020-06-10 NOTE — TELEPHONE ENCOUNTER
Routing to PCP for review/approval    Routing refill request to provider for review/approval because:  Labs overdue: LDL

## 2020-06-11 ENCOUNTER — TELEPHONE (OUTPATIENT)
Dept: FAMILY MEDICINE | Facility: CLINIC | Age: 55
End: 2020-06-11

## 2020-06-11 NOTE — TELEPHONE ENCOUNTER
Prior Authorization Retail Medication Request    Medication/Dose: tretinoin (RETIN-A) 0.025 % external cream    ICD code (if different than what is on RX):  na  Previously Tried and Failed:  negin  Rationale:  na    Insurance Name:  negin  Insurance ID:  na      Pharmacy Information (if different than what is on RX)  Name:  [facility]  Phone:  na

## 2020-06-11 NOTE — TELEPHONE ENCOUNTER
Prior Authorization Approval    Authorization Effective Date: 6/10/2020  Authorization Expiration Date: 6/10/2021  Medication: tretinoin (RETIN-A) 0.025 % external cream-APPROVED  Approved Dose/Quantity:   Reference #:     Insurance Company: [employer] - Phone [phone] Fax [phone]  Expected CoPay:       CoPay Card Available:      Foundation Assistance Needed:    Which Pharmacy is filling the prescription (Not needed for infusion/clinic administered): [facility] [address] [address]  Pharmacy Notified: Yes  Patient Notified: No    Pharmacy will notify patient when medication is ready.

## 2020-06-11 NOTE — TELEPHONE ENCOUNTER
Central Prior Authorization Team   Phone: [phone]      PA Initiation    Medication: tretinoin (RETIN-A) 0.025 % external cream-Initiated  Insurance Company: [employer] - Phone [phone] Fax [phone]  Pharmacy Filling the Rx: [id number] [place] - [address]  Filling Pharmacy Phone: [phone]  Filling Pharmacy Fax:    Start Date: 6/11/2020

## 2020-06-26 DIAGNOSIS — E78.5 HYPERLIPIDEMIA LDL GOAL <130: ICD-10-CM

## 2020-06-26 LAB
CHOLEST SERPL-MCNC: 226 MG/DL
HDLC SERPL-MCNC: 59 MG/DL
LDLC SERPL CALC-MCNC: 147 MG/DL
NONHDLC SERPL-MCNC: 167 MG/DL
TRIGL SERPL-MCNC: 101 MG/DL

## 2020-06-26 PROCEDURE — 80061 LIPID PANEL: CPT | Performed by: PHYSICIAN ASSISTANT

## 2020-06-26 PROCEDURE — 36415 COLL VENOUS BLD VENIPUNCTURE: CPT | Performed by: PHYSICIAN ASSISTANT

## 2020-08-07 ENCOUNTER — ANCILLARY PROCEDURE (OUTPATIENT)
Dept: MAMMOGRAPHY | Facility: CLINIC | Age: 55
End: 2020-08-07
Payer: COMMERCIAL

## 2020-08-07 DIAGNOSIS — Z12.31 VISIT FOR SCREENING MAMMOGRAM: ICD-10-CM

## 2020-08-11 ENCOUNTER — OFFICE VISIT (OUTPATIENT)
Dept: DERMATOLOGY | Facility: CLINIC | Age: 55
End: 2020-08-11
Payer: COMMERCIAL

## 2020-08-11 DIAGNOSIS — D22.5 MELANOCYTIC NEVUS OF TRUNK: ICD-10-CM

## 2020-08-11 DIAGNOSIS — L73.8 SEBACEOUS HYPERPLASIA: ICD-10-CM

## 2020-08-11 DIAGNOSIS — L81.4 SOLAR LENTIGINOSIS: Primary | ICD-10-CM

## 2020-08-11 DIAGNOSIS — L82.1 SK (SEBORRHEIC KERATOSIS): ICD-10-CM

## 2020-08-11 RX ORDER — HYDROQUINONE 40 MG/G
CREAM TOPICAL
Qty: 30 G | Refills: 1 | Status: SHIPPED | OUTPATIENT
Start: 2020-08-11 | End: 2024-04-29

## 2020-08-11 ASSESSMENT — PAIN SCALES - GENERAL: PAINLEVEL: NO PAIN (0)

## 2020-08-11 NOTE — NURSING NOTE
"Chief Complaint   Patient presents with     Derm Problem     Isela is here today for a skin check. She states \" no new concerns today. I want to discuss age spots\"     Kristan Marsh, LULU  "

## 2020-08-11 NOTE — LETTER
"8/11/2020       RE: Isela Cartwright  [address]  [facility] [address]     Dear Colleague,    Thank you for referring your patient, Isela Cartwright, to the [facility] DERMATOLOGY at [facility]. Please see a copy of my visit note below.    [facility] Dermatology Note      Dermatology Problem List:  1. Acne Vulgaris - face - tretinoin 0.025% cream nightly (refilled by PCP)  2. Family history of melanoma and NMSC (mother)  - last TBSE 08/11/20 benign    Encounter Date: Aug 11, 2020    CC:  Chief Complaint   Patient presents with     Derm Problem     Isela is here today for a skin check. She states \" no new concerns today. I want to discuss age spots\"     History of Present Illness:  Ms. Isela Cartwright is a 55 year old female who presents for evaluation of freckling on the right side of her face as well as a total body skin check. She was last seen by CHALINO Wong two years ago and had a mole removed at this time.    Today, she reports some brown aging spots on the right side of her face. They are completely asymptomatic but would like to discuss options for treatment or lightening of these spots (wondering about lemon juice).     Otherwise, she has not noticed any new or concerning lesions. She has a family history of melanoma and NMSC in her mother but no personal history of NMSC/melanoma.     She has been walking more frequently outside and is generally consistent with sunscreen application before daily walks.      She does not need a refill for her tretinoin cream and states her acne is improved with this therapy.    Past Medical History:   Patient Active Problem List   Diagnosis     Insomnia     Recurrent UTI     Hyperlipidemia LDL goal <160     Vaginal atrophy     Weight gain     Pain of finger of left hand     Past Medical History:   Diagnosis Date     Recurrent UTI 11/19/2009     Tobacco use disorder     quit in 2008     Past Surgical History: "   Procedure Laterality Date     COLONOSCOPY       GENITOURINARY SURGERY      burned uretha, chronic urinary tract infections before     SURGICAL HISTORY OF -       urethral dilation from UTI       Social History:  Patient reports that she quit smoking about 12 years ago. Her smoking use included cigarettes. She has a 7.50 pack-year smoking history. She has never used smokeless tobacco. She reports current alcohol use. She reports that she does not use drugs.    Family History:  Family History   Problem Relation Age of Onset     Hypertension Father      Hyperlipidemia Father      Cancer - colorectal Maternal Grandmother      Cerebrovascular Disease Maternal Grandmother         TIAs     Heart Disease Maternal Grandfather         cardiomegaly;  at 98     Breast Cancer Maternal Aunt         mom's half sister     Breast Cancer Cousin         paternal      Breast Cancer Cousin      C.A.D. No family hx of         no deaths at young age, bypass, etc.     Diabetes No family hx of         father is borderline     Medications:  Current Outpatient Medications   Medication Sig Dispense Refill     Calcium Carbonate-Vitamin D (CALCIUM 500 + D PO) Take 1 tablet by mouth daily.       simvastatin (ZOCOR) 20 MG tablet Take 1 tablet (20 mg) by mouth At Bedtime 90 tablet 3     tretinoin (RETIN-A) 0.025 % external cream Use nightly on entire face 45 g 3     zolpidem (AMBIEN) 10 MG tablet Take 1 tablet (10 mg) by mouth nightly as needed for sleep 30 tablet 5     Allergies   Allergen Reactions     Amoxicillin Rash     Bees      Sulfa Drugs Rash     Tetracycline      Mild itching; not sure if this is true allergy     Review of Systems:  -As per HPI  -Constitutional: Otherwise feeling well today, in usual state of health.  -HEENT: Patient denies nonhealing oral sores.  -Skin: As above in HPI. No additional skin concerns.    Physical exam:  Vitals: LMP 2007   GEN: This is a well developed, well-nourished female in no acute  distress, in a pleasant mood.    SKIN: Full skin, which includes the head/face, both arms, chest, back, abdomen,both legs, genitalia and/or groin buttocks, digits and/or nails, was examined.  - 2-4 mm light brown irregularly bordered macules on right side of face along jawline and temples  - Multiple light-medium brown waxy stuck on papules on trunk and upper extremities  - Multiple 2-4 mm medium brown pigmented macules and papules on trunk and extremities with regular borders and similar appearance  - 2-3 mm flesh colored papule on left central cheek  - No other lesions of concern on areas examined.     Impression/Plan:  1. Solar lentigines, more prevalent on right side of face. Discussed that she has several options to try and treat this: hydroquinone cream (~40 dollars at her [facility] pharmacy on [address]), azelaic acid (OTC but can be expensive), or cryotherapy (the most expensive option). She elected to start with the hydroquinone cream.   - Sun precaution was advised including the use of sun screens of SPF 30 or higher, sun protective clothing, and avoidance of tanning beds.  - Start hydroquinone 4% cream once a day to affected areas    2. Sebaceous hyperplasia, left cheek  - Benign nature was discussed. No further intervention required at this time.     3. Seborrheic keratosis, non irritated  - Benign nature was discussed. No further intervention required at this time.     4. Multiple clinically benign nevi on the trunk  - ABCDs of melanoma were discussed and self skin checks were advised.     Follow-up in 1 year, earlier for new or changing lesions.     Staff Involved:  INakul MS4, saw and examined the patient in the presence of Dr. Alexys Chavez.    Staff Physician:  I was present with the medical student who participated in the service and in the documentation of the note. I have verified the history and personally performed the physical exam and medical decision making. I agree with the assessment and  plan of care as documented in the note.     Alexys Chavez MD  Staff Dermatologist and Dermatopathologist  , Department of Dermatology      Again, thank you for allowing me to participate in the care of your patient.      Sincerely,    Alexys Chavez MD

## 2020-08-11 NOTE — PATIENT INSTRUCTIONS
"We did a total body skin check today. The spots on the right side of your face are called solar lentigines. These are informally called 'sun spots' or 'sun freckles'. Treating these is likely going to be deemed cosmetic by your insurance. We will have you treat this with hydroquinone 4% cream once a day to the affected areas.     Sun Protection  Sunscreen   What does \"broad spectrum mean\"?  Broad spectrum sunscreens protect against both UVA and UVB radiation. UVC is filtered out by the ozone layer.     What does SPF mean?   SPF stands for  Sun Protection Factor  and represents the ability to screen only UVB (burning) rays. UVB rays are mostly blocked in all sunscreens, but only those that contain titanium dioxide, zinc oxide, mexoryl or Parsol 1789 (avobenzone) block the UVA spectrum. Even though a sunscreen is labeled  UVA/UVB Protection  that is not entirely accurate because even partial protection allows this label!    What SPF should I chose?   Aim to get a sunscreen that is at least sun protection factor (SPF) 30. SPF 15 provides about 92-93% coverage, SPF 30 about 95-97% coverage, and SPF 45 about 98% coverage. That is to say, SPF 30 is not twice as good as SPF 15; think of it as a curve graph. If covering your whole body, you should be using 30 grams, or one ounce, which is how much is in one shot glass! That s a THICK layer! May times, you are only applying half the recommended amount, which means that you are only getting half the SPF (for example, you may be usin SPF 30 but if you're only applying half the recommended amount, you're only getting SPF 15 protection.     When do I need to wear sunscreen?  Every day, rain or shine! Even on a rainy or foggy day, you are still being exposed harmful UV radiation from the sun. Recommend physical/mineral sunscreens (active ingredient should be titanium dioxide or zinc oxide) as these ingredients have been around for many years, there is no concern of them being " absorbed into the bloodstream, and they are coral reef friendly!    Sunscreen is not recommended for infants under the age of 6 months. Use clothing, shade and sun avoidance for small infants. Sunscreen clothing and hats are also important for people of all ages.    Sun protective Clothing:  www.coolibar.com  www.sunprecautions.com  www.[employer].com    The ABCDEs of Melanoma  Asymmetry, Border (irregularity), Color (not uniform, changes in color), Diameter (greater than 6 mm which is about the size of a pencil eraser), and Evolving (any changes in preexisting moles)    Skin cancer can develop anywhere on the skin. Once a month, take a look at your entire body and note any changing moles or spots. Ask someone for help when checking your skin, especially for hard to see places such as your back. If you notice a mole that looks different from others, or one that changes, enlarges, itches, or bleeds, you should see a dermatologist.
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
## 2020-10-06 ENCOUNTER — OFFICE VISIT (OUTPATIENT)
Dept: FAMILY MEDICINE | Facility: CLINIC | Age: 55
End: 2020-10-06
Payer: COMMERCIAL

## 2020-10-06 VITALS
WEIGHT: 153.5 LBS | OXYGEN SATURATION: 99 % | DIASTOLIC BLOOD PRESSURE: 81 MMHG | SYSTOLIC BLOOD PRESSURE: 121 MMHG | TEMPERATURE: 98.4 F | HEART RATE: 85 BPM | BODY MASS INDEX: 22.83 KG/M2

## 2020-10-06 DIAGNOSIS — R30.0 DYSURIA: ICD-10-CM

## 2020-10-06 DIAGNOSIS — R82.90 NONSPECIFIC FINDING ON EXAMINATION OF URINE: ICD-10-CM

## 2020-10-06 DIAGNOSIS — N30.00 ACUTE CYSTITIS WITHOUT HEMATURIA: Primary | ICD-10-CM

## 2020-10-06 LAB

## 2020-10-06 PROCEDURE — 87088 URINE BACTERIA CULTURE: CPT | Performed by: PHYSICIAN ASSISTANT

## 2020-10-06 PROCEDURE — 87086 URINE CULTURE/COLONY COUNT: CPT | Performed by: PHYSICIAN ASSISTANT

## 2020-10-06 PROCEDURE — 81001 URINALYSIS AUTO W/SCOPE: CPT | Performed by: PHYSICIAN ASSISTANT

## 2020-10-06 PROCEDURE — 99213 OFFICE O/P EST LOW 20 MIN: CPT | Performed by: PHYSICIAN ASSISTANT

## 2020-10-06 PROCEDURE — 87186 SC STD MICRODIL/AGAR DIL: CPT | Performed by: PHYSICIAN ASSISTANT

## 2020-10-06 RX ORDER — NITROFURANTOIN 25; 75 MG/1; MG/1
100 CAPSULE ORAL 2 TIMES DAILY
Qty: 10 CAPSULE | Refills: 0 | Status: SHIPPED | OUTPATIENT
Start: 2020-10-06 | End: 2020-11-04

## 2020-10-06 NOTE — PROGRESS NOTES
Subjective     Isela Cartwright is a 55 year old female who presents to clinic today for the following health issues:    HPI         Genitourinary - Female  Onset/Duration: few hours ago  Description:   Painful urination (Dysuria): YES           Frequency: YES  Blood in urine (Hematuria): no  Delay in urine (Hesitency): no  Intensity: moderate  Progression of Symptoms:  worsening  Accompanying Signs & Symptoms:  Fever/chills: no  Flank pain: no  Nausea and vomiting: no  Vaginal symptoms: none  Abdominal/Pelvic Pain: no  History:   History of frequent UTI s: YES  History of kidney stones: no  Sexually Active: no  Possibility of pregnancy: No  Precipitating or alleviating factors: None  Therapies tried and outcome:  None     Had surgery many years ago on her urethra. Gets bladder infections about once per year.           Review of Systems   Constitutional, HEENT, cardiovascular, pulmonary, gi and gu systems are negative, except as otherwise noted.      Objective    /81 (BP Location: Right arm, Patient Position: Chair, Cuff Size: Adult Regular)   Pulse 85   Temp 98.4  F (36.9  C) (Oral)   Wt 69.6 kg (153 lb 8 oz)   LMP 07/13/2007   SpO2 99%   BMI 22.83 kg/m    Body mass index is 22.83 kg/m .  Physical Exam   GENERAL: healthy, alert and no distress  NECK: no adenopathy, no asymmetry, masses, or scars and thyroid normal to palpation  RESP: lungs clear to auscultation - no rales, rhonchi or wheezes  CV: regular rate and rhythm, normal S1 S2, no S3 or S4, no murmur, click or rub, no peripheral edema and peripheral pulses strong  ABDOMEN: soft, nontender, no hepatosplenomegaly, no masses and bowel sounds normal  MS: no gross musculoskeletal defects noted, no edema    Results for orders placed or performed in visit on 10/06/20 (from the past 24 hour(s))   UA reflex to Microscopic and Culture    Specimen: Midstream Urine   Result Value Ref Range    Color Urine Yellow     Appearance Urine Clear     Glucose Urine  Negative NEG^Negative mg/dL    Bilirubin Urine Negative NEG^Negative    Ketones Urine Negative NEG^Negative mg/dL    Specific Gravity Urine 1.020 1.003 - 1.035    Blood Urine Small (A) NEG^Negative    pH Urine 6.0 5.0 - 7.0 pH    Protein Albumin Urine Negative NEG^Negative mg/dL    Urobilinogen Urine 0.2 0.2 - 1.0 EU/dL    Nitrite Urine Negative NEG^Negative    Leukocyte Esterase Urine Large (A) NEG^Negative    Source Midstream Urine    Urine Microscopic   Result Value Ref Range    WBC Urine 25-50 (A) OTO5^0 - 5 /HPF    RBC Urine 5-10 (A) OTO2^O - 2 /HPF    Squamous Epithelial /LPF Urine Few FEW^Few /LPF    Bacteria Urine Moderate (A) NEG^Negative /HPF           Assessment & Plan     Acute cystitis without hematuria  Patient has had recurrent UTI's for many years. Approximately one per year. Will treat with macrobid. Should increase fluids. Patient has no additional questions.  - nitroFURantoin macrocrystal-monohydrate (MACROBID) 100 MG capsule; Take 1 capsule (100 mg) by mouth 2 times daily    Dysuria  Nonspecific finding on examination of urine  As above.  - UA reflex to Microscopic and Culture  - Urine Microscopic  - Urine Culture Aerobic Bacterial        Return if symptoms worsen or fail to improve.    Nicolasa Shi PA-C  [facility]

## 2020-10-07 LAB
BACTERIA SPEC CULT: ABNORMAL
Lab: ABNORMAL
SPECIMEN SOURCE: ABNORMAL

## 2020-10-08 NOTE — RESULT ENCOUNTER NOTE
Isela,  Based on these urine culture results, it appears that the nitrofurantoin antibiotic medicine you were prescribed should clear your urinary tract infection.    Steve Meehan MD (for Nicolasa Shi)

## 2020-11-04 ENCOUNTER — OFFICE VISIT (OUTPATIENT)
Dept: FAMILY MEDICINE | Facility: CLINIC | Age: 55
End: 2020-11-04
Payer: COMMERCIAL

## 2020-11-04 VITALS
WEIGHT: 157 LBS | HEART RATE: 88 BPM | SYSTOLIC BLOOD PRESSURE: 122 MMHG | OXYGEN SATURATION: 95 % | BODY MASS INDEX: 23.35 KG/M2 | DIASTOLIC BLOOD PRESSURE: 80 MMHG

## 2020-11-04 DIAGNOSIS — R30.9 PAINFUL URINATION: Primary | ICD-10-CM

## 2020-11-04 LAB
ALBUMIN UR-MCNC: NEGATIVE MG/DL
APPEARANCE UR: CLEAR
BACTERIA #/AREA URNS HPF: ABNORMAL /HPF
BILIRUB UR QL STRIP: NEGATIVE
COLOR UR AUTO: YELLOW
GLUCOSE UR STRIP-MCNC: NEGATIVE MG/DL
HGB UR QL STRIP: NEGATIVE
KETONES UR STRIP-MCNC: NEGATIVE MG/DL
LEUKOCYTE ESTERASE UR QL STRIP: ABNORMAL
NITRATE UR QL: NEGATIVE
NON-SQ EPI CELLS #/AREA URNS LPF: ABNORMAL /LPF
PH UR STRIP: 5.5 PH (ref 5–7)
RBC #/AREA URNS AUTO: ABNORMAL /HPF
SOURCE: ABNORMAL
SP GR UR STRIP: <=1.005 (ref 1–1.03)
UROBILINOGEN UR STRIP-ACNC: 0.2 EU/DL (ref 0.2–1)
WBC #/AREA URNS AUTO: ABNORMAL /HPF

## 2020-11-04 PROCEDURE — 81001 URINALYSIS AUTO W/SCOPE: CPT | Performed by: INTERNAL MEDICINE

## 2020-11-04 PROCEDURE — 99213 OFFICE O/P EST LOW 20 MIN: CPT | Performed by: INTERNAL MEDICINE

## 2020-11-04 RX ORDER — CIPROFLOXACIN 500 MG/1
500 TABLET, FILM COATED ORAL 2 TIMES DAILY
Qty: 20 TABLET | Refills: 0 | Status: SHIPPED | OUTPATIENT
Start: 2020-11-04 | End: 2020-11-14

## 2020-11-05 NOTE — PROGRESS NOTES
Subjective     Isela Cartwright is a 55 year old female who presents to clinic today for the following health issues:    HPI         Patient feels symptoms of UTI like last one 1 month ago  No inciting events      Review of Systems   Constitutional, HEENT, cardiovascular, pulmonary, gi and gu systems are negative, except as otherwise noted.      Objective    /80   Pulse 88   Wt 71.2 kg (157 lb)   LMP 07/13/2007   SpO2 95%   BMI 23.35 kg/m    Body mass index is 23.35 kg/m .  Physical Exam   GENERAL: healthy, alert and no distress  EYES: Eyes grossly normal to inspection, PERRL and conjunctivae and sclerae normal  HENT: ear canals and TM's normal, nose and mouth without ulcers or lesions  NECK: no adenopathy, no asymmetry, masses, or scars and thyroid normal to palpation  RESP: lungs clear to auscultation - no rales, rhonchi or wheezes  CV: regular rate and rhythm, normal S1 S2, no S3 or S4, no murmur, click or rub, no peripheral edema and peripheral pulses strong  ABDOMEN: soft, nontender, no hepatosplenomegaly, no masses and bowel sounds normal  MS: no gross musculoskeletal defects noted, no edema    Results for orders placed or performed in visit on 11/04/20   *UA reflex to Microscopic and Culture ([place] and [facility] (except [place] and [place])     Status: Abnormal    Specimen: Midstream Urine   Result Value Ref Range    Color Urine Yellow     Appearance Urine Clear     Glucose Urine Negative NEG^Negative mg/dL    Bilirubin Urine Negative NEG^Negative    Ketones Urine Negative NEG^Negative mg/dL    Specific Gravity Urine <=1.005 1.003 - 1.035    Blood Urine Negative NEG^Negative    pH Urine 5.5 5.0 - 7.0 pH    Protein Albumin Urine Negative NEG^Negative mg/dL    Urobilinogen Urine 0.2 0.2 - 1.0 EU/dL    Nitrite Urine Negative NEG^Negative    Leukocyte Esterase Urine Small (A) NEG^Negative    Source Midstream Urine    Urine Microscopic     Status: Abnormal   Result Value Ref Range    WBC Urine  0 - 5 OTO5^0 - 5 /HPF    RBC Urine O - 2 OTO2^O - 2 /HPF    Squamous Epithelial /LPF Urine Few FEW^Few /LPF    Bacteria Urine Few (A) NEG^Negative /HPF           Assessment & Plan     Diagnoses and all orders for this visit:    Painful urination  -     *UA reflex to Microscopic and Culture ([place] and [facility] (except [place] and [place])  -     Urine Microscopic  -     ciprofloxacin (CIPRO) 500 MG tablet; Take 1 tablet (500 mg) by mouth 2 times daily for 10 days        hold script 1-2 days if worsening or not improving ok to start antibiotics based on last antibiotics      Regular exercise    No follow-ups on file.    Moy Borges MD  [facility]

## 2020-11-16 ENCOUNTER — MYC MEDICAL ADVICE (OUTPATIENT)
Dept: FAMILY MEDICINE | Facility: CLINIC | Age: 55
End: 2020-11-16

## 2020-11-16 NOTE — TELEPHONE ENCOUNTER
Mychart message sent. Patient had a lab-only appt to check cholesterol on 6/26. This form requires the testing to be from 10/1/20-8/31/21.    [email or web address] message sent advising patient to schedule an annual physical and we can complete this form at that time.    Arnav Dunham

## 2020-12-02 ENCOUNTER — MYC MEDICAL ADVICE (OUTPATIENT)
Dept: FAMILY MEDICINE | Facility: CLINIC | Age: 55
End: 2020-12-02

## 2020-12-02 NOTE — TELEPHONE ENCOUNTER
RN replied to patient via [employer]. See message for details.     Trini Newton RN, BSN, PHN  [facility]: [facility]

## 2020-12-03 ENCOUNTER — E-VISIT (OUTPATIENT)
Dept: FAMILY MEDICINE | Facility: CLINIC | Age: 55
End: 2020-12-03
Payer: COMMERCIAL

## 2020-12-03 DIAGNOSIS — U07.1 LAB TEST POSITIVE FOR DETECTION OF COVID-19 VIRUS: ICD-10-CM

## 2020-12-03 DIAGNOSIS — U07.1 LAB TEST POSITIVE FOR DETECTION OF COVID-19 VIRUS: Primary | ICD-10-CM

## 2020-12-03 PROCEDURE — U0003 INFECTIOUS AGENT DETECTION BY NUCLEIC ACID (DNA OR RNA); SEVERE ACUTE RESPIRATORY SYNDROME CORONAVIRUS 2 (SARS-COV-2) (CORONAVIRUS DISEASE [COVID-19]), AMPLIFIED PROBE TECHNIQUE, MAKING USE OF HIGH THROUGHPUT TECHNOLOGIES AS DESCRIBED BY CMS-2020-01-R: HCPCS | Performed by: PHYSICIAN ASSISTANT

## 2020-12-03 PROCEDURE — 99421 OL DIG E/M SVC 5-10 MIN: CPT | Performed by: PHYSICIAN ASSISTANT

## 2020-12-04 ENCOUNTER — MYC MEDICAL ADVICE (OUTPATIENT)
Dept: FAMILY MEDICINE | Facility: CLINIC | Age: 55
End: 2020-12-04

## 2020-12-04 LAB
SARS-COV-2 RNA SPEC QL NAA+PROBE: NOT DETECTED
SPECIMEN SOURCE: NORMAL

## 2020-12-04 NOTE — TELEPHONE ENCOUNTER
[employer] message sent.    Patient should receive COVID results through [employer], continue to quarantine.      Martina Marcano RN

## 2021-01-15 ENCOUNTER — HEALTH MAINTENANCE LETTER (OUTPATIENT)
Age: 56
End: 2021-01-15

## 2021-04-06 ENCOUNTER — IMMUNIZATION (OUTPATIENT)
Dept: NURSING | Facility: CLINIC | Age: 56
End: 2021-04-06
Payer: COMMERCIAL

## 2021-04-06 PROCEDURE — 91300 PR COVID VAC PFIZER DIL RECON 30 MCG/0.3 ML IM: CPT

## 2021-04-06 PROCEDURE — 0001A PR COVID VAC PFIZER DIL RECON 30 MCG/0.3 ML IM: CPT

## 2021-04-27 ENCOUNTER — IMMUNIZATION (OUTPATIENT)
Dept: NURSING | Facility: CLINIC | Age: 56
End: 2021-04-27
Attending: INTERNAL MEDICINE
Payer: COMMERCIAL

## 2021-04-27 PROCEDURE — 0002A PR COVID VAC PFIZER DIL RECON 30 MCG/0.3 ML IM: CPT

## 2021-04-27 PROCEDURE — 91300 PR COVID VAC PFIZER DIL RECON 30 MCG/0.3 ML IM: CPT

## 2021-08-09 DIAGNOSIS — Z12.31 VISIT FOR SCREENING MAMMOGRAM: ICD-10-CM

## 2021-08-09 PROCEDURE — 77067 SCR MAMMO BI INCL CAD: CPT | Mod: GC | Performed by: RADIOLOGY

## 2021-08-09 PROCEDURE — 77063 BREAST TOMOSYNTHESIS BI: CPT | Mod: GC | Performed by: RADIOLOGY

## 2021-08-21 DIAGNOSIS — E78.5 HYPERLIPIDEMIA LDL GOAL <130: ICD-10-CM

## 2021-08-22 RX ORDER — SIMVASTATIN 20 MG
20 TABLET ORAL AT BEDTIME
Qty: 90 TABLET | Refills: 0 | Status: SHIPPED | OUTPATIENT
Start: 2021-08-22 | End: 2021-11-15

## 2021-10-24 ENCOUNTER — HEALTH MAINTENANCE LETTER (OUTPATIENT)
Age: 56
End: 2021-10-24

## 2021-10-27 ENCOUNTER — OFFICE VISIT (OUTPATIENT)
Dept: DERMATOLOGY | Facility: CLINIC | Age: 56
End: 2021-10-27
Payer: COMMERCIAL

## 2021-10-27 DIAGNOSIS — D22.9 MULTIPLE PIGMENTED NEVI: ICD-10-CM

## 2021-10-27 DIAGNOSIS — L81.4 SOLAR LENTIGINOSIS: ICD-10-CM

## 2021-10-27 DIAGNOSIS — Z12.83 SKIN CANCER SCREENING: ICD-10-CM

## 2021-10-27 DIAGNOSIS — L82.1 SEBORRHEIC KERATOSES: Primary | ICD-10-CM

## 2021-10-27 DIAGNOSIS — L57.0 ACTINIC KERATOSIS: ICD-10-CM

## 2021-10-27 PROCEDURE — 17000 DESTRUCT PREMALG LESION: CPT | Performed by: PHYSICIAN ASSISTANT

## 2021-10-27 PROCEDURE — 99213 OFFICE O/P EST LOW 20 MIN: CPT | Mod: 25 | Performed by: PHYSICIAN ASSISTANT

## 2021-10-27 ASSESSMENT — PAIN SCALES - GENERAL: PAINLEVEL: NO PAIN (0)

## 2021-10-27 NOTE — PATIENT INSTRUCTIONS
Cryotherapy    What is it?    Use of a very cold liquid, such as liquid nitrogen, to freeze and destroy abnormal skin cells that need to be removed    What should I expect?    Tenderness and redness    A small blister that might grow and fill with dark purple blood. There may be crusting.    More than one treatment may be needed if the lesions do not go away.    How do I care for the treated area?    Gently wash the area with your hands when bathing.    Use a thin layer of Vaseline to help with healing. You may use a Band-Aid.     The area should heal within 7-10 days and may leave behind a pink or lighter color.     Do not use an antibiotic or Neosporin ointment.     You may take acetaminophen (Tylenol) for pain.     Call your doctor if you have:    Severe pain    Signs of infection (warmth, redness, cloudy yellow drainage, and or a bad smell)    Questions or concerns    Who should I call with questions?       [facility]: [phone]       [facility]: [phone]       For urgent needs outside of business hours call the [facility] at [phone] and ask for the dermatology resident on call    
Statement Selected

## 2021-10-27 NOTE — LETTER
10/27/2021       RE: Isela Cartwright  [address]  [place]. \[facility].\"" [id number]     Dear Colleague,    Thank you for referring your patient, Isela Cartwright, to the [facility] DERMATOLOGY [facility]INIC [place] at [facility]. Please see a copy of my visit note below.    [facility] Dermatology Note  Encounter Date: Oct 27, 2021  Office Visit     Dermatology Problem List:  # Acne Vulgaris - face - tretinoin 0.025% cream nightly (refilled by PCP)  # Family history of melanoma and NMSC (mother)  - last TBSE 10/27/2021   # Solar lentigines  - Hydroquinone 4% cream   # AK s/p cryo    ____________________________________________    Assessment & Plan:    # Actinic keratosis, right lateral cheek  - Cryo today, see note below    # Solar lentigines, more prevalent on right side of face.  - Continue hydroquinone 4% cream   - Sun protection advised     # Dermatofibromas  # Seborrheic keratoses  # Multiple benign nevi.   - No concerning lesions today  - Counseled on ABCDEs of melanoma and sun protection - recommend SPF 30 or higher with frequent application   - Return sooner if noticing changing or symptomatic lesions     Procedures Performed:   - Cryotherapy procedure note, location(s): see above. After verbal consent and discussion of risks and benefits including, but not limited to, dyspigmentation/scar, blister, and pain, 1 lesion(s) was(were) treated with 1-2 mm freeze border for 1-2 cycles with liquid nitrogen. Post cryotherapy instructions were provided.    Follow-up: 1 year(s) in-person, or earlier for new or changing lesions    Staff and Scribe:     Provider Disclosure:   The documentation recorded by the scribe accurately reflects the services I personally performed and the decisions made by me.    All risks, benefits and alternatives were discussed with patient.  Patient is in agreement and understands the assessment and plan.  All questions were  answered.  Sun Screen Education was given.   Return to Clinic annually or sooner as needed.   Nidhi Trevino PA-C   [facility]     Scribe Disclosure:  I, Aj Sorto, am serving as a scribe to document services personally performed by Nidhi Trevino PA-C based on data collection and the provider's statements to me.   ____________________________________________    CC: Derm Problem (concerned about lesions under R breast and L side)    HPI:  Ms. Isela Cartwright is a(n) 56 year old female who presents today as a return patient for FBSE. Last seen in dermatology by Dr. Chavez on 8/11/2020, at which time patient was started on hydroquinone 4% cream daily for treatment of solar lentigines.    Today, patient reports spots under the right breast and on the left side that she would like examined. She reports using hydroquione cream daily that she thinks has been moderately helpful. Additionally, patient reports family history of melanoma (mother), personal history of sun exposure and tanning bed use. Patient also uses SPF.    Patient is otherwise feeling well, without additional skin concerns.    Labs Reviewed:  N/A    Physical Exam:  Vitals: LMP 07/13/2007   SKIN: Full skin, which includes the head/face, both arms, chest, back, abdomen,both legs, genitalia and/or groin buttocks, digits and/or nails, was examined.  - Multiple regular brown pigmented macules and papules are identified on the trunk and extremities.   - Scattered brown macules on sun exposed areas.  - There are waxy stuck on tan to brown papules on the trunk and extremities.   - There is an erythematous macule with overyling adherent scale on the right lateral cheek.    - There are firm tan/flesh colored papules that dimple with lateral pressure on the right and left posterior shoulders.    - No other lesions of concern on areas examined.     Medications:  Current Outpatient Medications   Medication     Calcium  Carbonate-Vitamin D (CALCIUM 500 + D PO)     hydroquinone (KAVIN) 4 % external cream     simvastatin (ZOCOR) 20 MG tablet     tretinoin (RETIN-A) 0.025 % external cream     zolpidem (AMBIEN) 10 MG tablet     No current facility-administered medications for this visit.      Past Medical History:   Patient Active Problem List   Diagnosis     Insomnia     Recurrent UTI     Hyperlipidemia LDL goal <160     Vaginal atrophy     Weight gain     Pain of finger of left hand     Past Medical History:   Diagnosis Date     Recurrent UTI 11/19/2009     Tobacco use disorder     quit in 2008        CC No referring provider defined for this encounter. on close of this encounter.

## 2021-10-27 NOTE — NURSING NOTE
Chief Complaint   Patient presents with     Derm Problem     concerned about lesions under R breast and L side     Whitney, EMT

## 2021-10-27 NOTE — PROGRESS NOTES
[facility] Dermatology Note  Encounter Date: Oct 27, 2021  Office Visit     Dermatology Problem List:  # Acne Vulgaris - face - tretinoin 0.025% cream nightly (refilled by PCP)  # Family history of melanoma and NMSC (mother)  - last TBSE 10/27/2021   # Solar lentigines  - Hydroquinone 4% cream   # AK s/p cryo    ____________________________________________    Assessment & Plan:    # Actinic keratosis, right lateral cheek  - Cryo today, see note below    # Solar lentigines, more prevalent on right side of face.  - Continue hydroquinone 4% cream   - Sun protection advised     # Dermatofibromas  # Seborrheic keratoses  # Multiple benign nevi.   - No concerning lesions today  - Counseled on ABCDEs of melanoma and sun protection - recommend SPF 30 or higher with frequent application   - Return sooner if noticing changing or symptomatic lesions     Procedures Performed:   - Cryotherapy procedure note, location(s): see above. After verbal consent and discussion of risks and benefits including, but not limited to, dyspigmentation/scar, blister, and pain, 1 lesion(s) was(were) treated with 1-2 mm freeze border for 1-2 cycles with liquid nitrogen. Post cryotherapy instructions were provided.    Follow-up: 1 year(s) in-person, or earlier for new or changing lesions    Staff and Scribe:     Provider Disclosure:   The documentation recorded by the scribe accurately reflects the services I personally performed and the decisions made by me.    All risks, benefits and alternatives were discussed with patient.  Patient is in agreement and understands the assessment and plan.  All questions were answered.  Sun Screen Education was given.   Return to Clinic annually or sooner as needed.   Nidhi Trevino PA-C   [facility]     Scribe Disclosure:  I, Aj Sorto, am serving as a scribe to document services personally performed by Nidhi Trevino PA-C based on data  collection and the provider's statements to me.   ____________________________________________    CC: Derm Problem (concerned about lesions under R breast and L side)    HPI:  Ms. Isela Cartwright is a(n) 56 year old female who presents today as a return patient for FBSE. Last seen in dermatology by Dr. Chavez on 8/11/2020, at which time patient was started on hydroquinone 4% cream daily for treatment of solar lentigines.    Today, patient reports spots under the right breast and on the left side that she would like examined. She reports using hydroquione cream daily that she thinks has been moderately helpful. Additionally, patient reports family history of melanoma (mother), personal history of sun exposure and tanning bed use. Patient also uses SPF.    Patient is otherwise feeling well, without additional skin concerns.    Labs Reviewed:  N/A    Physical Exam:  Vitals: LMP 07/13/2007   SKIN: Full skin, which includes the head/face, both arms, chest, back, abdomen,both legs, genitalia and/or groin buttocks, digits and/or nails, was examined.  - Multiple regular brown pigmented macules and papules are identified on the trunk and extremities.   - Scattered brown macules on sun exposed areas.  - There are waxy stuck on tan to brown papules on the trunk and extremities.   - There is an erythematous macule with overyling adherent scale on the right lateral cheek.    - There are firm tan/flesh colored papules that dimple with lateral pressure on the right and left posterior shoulders.    - No other lesions of concern on areas examined.     Medications:  Current Outpatient Medications   Medication     Calcium Carbonate-Vitamin D (CALCIUM 500 + D PO)     hydroquinone (KAVIN) 4 % external cream     simvastatin (ZOCOR) 20 MG tablet     tretinoin (RETIN-A) 0.025 % external cream     zolpidem (AMBIEN) 10 MG tablet     No current facility-administered medications for this visit.      Past Medical History:   Patient Active  Problem List   Diagnosis     Insomnia     Recurrent UTI     Hyperlipidemia LDL goal <160     Vaginal atrophy     Weight gain     Pain of finger of left hand     Past Medical History:   Diagnosis Date     Recurrent UTI 11/19/2009     Tobacco use disorder     quit in 2008        CC No referring provider defined for this encounter. on close of this encounter.

## 2021-11-13 DIAGNOSIS — E78.5 HYPERLIPIDEMIA LDL GOAL <130: ICD-10-CM

## 2021-11-15 RX ORDER — SIMVASTATIN 20 MG
20 TABLET ORAL AT BEDTIME
Qty: 90 TABLET | Refills: 0 | Status: SHIPPED | OUTPATIENT
Start: 2021-11-15 | End: 2022-03-21

## 2021-11-15 NOTE — TELEPHONE ENCOUNTER
Routing refill request to provider for review/approval because:  Vivian given x1 and patient did not follow up, please advise      LDL Cholesterol Calculated   Date Value Ref Range Status   06/26/2020 147 (H) <100 mg/dL Final     Comment:     Above desirable:  100-129 mg/dl  Borderline High:  130-159 mg/dL  High:             160-189 mg/dL  Very high:       >189 mg/dl         .Trini Newton, RN, BSN, PHN  [facility]: [place]

## 2021-12-28 ENCOUNTER — E-VISIT (OUTPATIENT)
Dept: URGENT CARE | Facility: URGENT CARE | Age: 56
End: 2021-12-28
Payer: COMMERCIAL

## 2021-12-28 DIAGNOSIS — Z20.822 CLOSE EXPOSURE TO 2019 NOVEL CORONAVIRUS: Primary | ICD-10-CM

## 2021-12-28 PROCEDURE — 99421 OL DIG E/M SVC 5-10 MIN: CPT | Performed by: FAMILY MEDICINE

## 2021-12-28 NOTE — PATIENT INSTRUCTIONS
Dear Isela,    Based on your exposure to COVID-19 (coronavirus), we would like to test you for this virus. I have placed an order for this test. The best time for testing is 5-7 days after the exposure.    How to schedule:  Go to your [employer] home page and scroll down to the section that says  You have an appointment that needs to be scheduled  and click the large green button that says  Schedule Now  and follow the steps to find the next available opening.     If you are unable to complete these [employer] scheduling steps, please call [phone] to schedule your testing.     Monoclonal antibody treatment after exposure:  Because you have been exposed to COVID-19, you may be able to receive a treatment with monoclonal antibodies. This treatment can lower your risk of severe illness and going to the hospital. It is given through an IV or under your skin (subcutaneous) and must be given at an infusion center.   To be eligible, you must be 12 years or older, at least 88 pounds and:    Are not fully vaccinated against COVID-19, OR    Are immunocompromised     If you would like to sign up to be considered to receive the monoclonal antibody medicine, please complete a participation form through the [facility] here:  MNRAP ([email or web address]/[email or web address]/[email or web address]). You may also call the [facility] COVID-19 Public Hotline at 1-789.281.7845 (open Mon-Fri: 9am-7pm and Sat: 10am-6pm).     Not all people who are eligible will receive the medicine since supply is limited. You will be contacted in the next 1 to 2 business days only if you are selected. If you do not receive a call, you have not been selected to receive the medicine. If you have any questions about this medication, please contact your primary care provider. For more information, see [email or web address]/[email or web address]/[email or web address]/[email or web address].pdf    Return to work/school/ guidance:   Please let your workplace manager and  staffing office know when your quarantine ends. We cannot give you an exact date as it depends on the information below. You can calculate this on your own or work with your manager/staffing office to calculate this.    Quarantine Guidelines:  You are considered exposed if you have been within 6 feet of an infected person(s) for 15 minutes or more over a 24-hour period. Quarantine will start after the LAST time you had contact with the infected person while they were contagious (for example, if you saw someone on Monday and Wednesday, your quarantine would start after Wednesday).     If you have NO symptoms (asymptomatic):    Stay home for 14 days (quarantine) after your LAST contact with a person who has COVID-19 (this remains the CDC recommendation for greatest protection against spread of COVID-19), OR    10-day quarantine with no test, OR    Minimum 7-day quarantine with negative RT-PCR test collected on day 5 or later    Quarantine Guideline exceptions:    People who have been fully vaccinated do not need to quarantine unless they have symptoms. You are considered fully vaccinated 2 weeks after your 2nd dose in a 2-dose series or 2 weeks after a single-dose series. This includes vaccinated health care workers.  o Fully vaccinated people should still get tested 5-7 days after exposure, even if they don t have symptoms.   Note: If you have ongoing exposure to the COVID-positive person, this quarantine period may be more than 14 days. For example, if you continue to be exposed to your child who tested positive and cannot isolate from them, then the quarantine of 7-14 days can't start until your child is no longer contagious. This is typically 10 days from onset of the child's symptoms. So, the total duration may be 17-24 days in this case.   Please contact your doctor if you have questions or call the [facility] Public Hotline: 1-421.983.3311 (Mon-Fri: 9am-7pm and Sat: 10am-6pm).     How to Quarantine:     Monitor your  symptoms until 14 days after your last exposure. If you develop signs or symptoms, isolate and get tested (even if you have been tested already).    Stay home and away from others. Don't go to school or anywhere else. Generally, quarantine means staying home from work but there are some exceptions to this. Please contact your workplace. Cover your mouth and nose with a face covering if you must be around other people.     Wash your hands and face often. Use soap and water.    What are the symptoms of COVID-19?  The most common symptoms are cough, fever and trouble breathing. Less common symptoms include headache, body aches, fatigue (feeling very tired), chills, sore throat, stuffy or runny nose, diarrhea (loose poop), loss of taste or smell, belly pain, and nausea or vomiting (feeling sick to your stomach or throwing up).  If you develop symptoms, follow these guidelines:    If you're normally healthy: Please start another eVisit.    If you have a serious health problem (like cancer, heart failure, an organ transplant or kidney disease): Call your specialty clinic. Let them know that you might have COVID-19.    Where can I get more information?    [facility] - About COVID-19: [email or web address]     CDC - What to Do If You're Sick:     www.cdc.gov/coronavirus/2019-ncov/about/steps-when-sick.html    CDC - Ending Home Isolation:  https://www.cdc.gov/coronavirus/2019-ncov/your-health/quarantine-isolation.html    CDC - Caring for Someone:  www.cdc.gov/coronavirus/2019-ncov/if-you-are-sick/care-for-someone.html    [facility] clinical trials (COVID-19 research studies): clinicalaffairs.[facility]/umn-clinical-trials    Below are the COVID-19 hotlines at the [facility] ([facility]). Interpreters are available.  o For health questions: Call 280-462-1085 or 1-857.905.3016 (7 am to 7 pm)  o For questions about schools and childcare: Call 667-791-7441 or 1-766.673.6692 (7 a.m. to 7  "p.m.)    December 28, 2021  RE:  Isela Cartwright                                                                                                                   [address] [facility] [address]      To whom it may concern:    I evaluated Isela Cartwright on December 28, 2021. Isela Cartwright should be excused from work/school.    They should let their workplace manager and staffing office know when their quarantine ends.    We can not give an exact date as it depends on the information below. They can calculate this on their own or work with their manager/staffing office to calculate this. (For example if they were exposed on 10/04, they would have to quarantine for 14 full days. That would be through 10/18. They could return on 10/19.)    Quarantine Guidelines:    Patients (\"contacts\") who have been in close prolonged contact of an infected person(s) (within six feet for at least 15 minutes within a 24 hour period) and remain asymptomatic should enter quarantine based on the following options:      14-day quarantine period (this remains the CDC recommendation for the greatest protection against spread of COVID-19) OR    Minimum 7-day quarantine with negative RT-PCR test collected on day 5 or later OR    10-day quarantine with no test   Quarantine Guideline exceptions are as follows:    People who have been fully vaccinated do not need to quarantine if the exposure was at least 2 weeks after the last vaccination. This includes vaccinated health care workers.    Not fully vaccinated and unvaccinated Individuals who work in health care, congregate care, or congregate living should be off work for 14 days from their last date of exposure. Community activities for this group can be resumed based on options above. Fully vaccinated individuals in this group do not need to quarantine from work after exposure.    Not fully vaccinated and unvaccinated people whose high-risk exposure was a household member should " always quarantine for 14 days from their last date of exposure. Fully vaccinated people in this category do not need to quarantine.    Not fully vaccinated or unvaccinated residents of congregate care and congregate living settings should always quarantine for 14 days from their last date of exposure. Fully vaccinated residents do not need to quarantine.    Note: If there is ongoing exposure to the covid positive person, this quarantine period may be longer than 14 days. (For example, if they are continually exposed to their child, who tested positive and cannot isolate from them, then the quarantine of 7-14 days can't start until their child is no longer contagious. This is typically 10 days from onset to the child's symptoms. So the total duration may be 17-24 days in this case.)    Isela Cartwright should continue symptom monitoring until day 14 post-exposure. If they develop signs or symptoms of COVID-19, they should isolate and get tested (even if they have been tested already).    Sincerely,  Wade Koenig, DO

## 2021-12-29 ENCOUNTER — LAB (OUTPATIENT)
Dept: LAB | Facility: CLINIC | Age: 56
End: 2021-12-29
Attending: FAMILY MEDICINE
Payer: COMMERCIAL

## 2021-12-29 DIAGNOSIS — Z20.822 CLOSE EXPOSURE TO 2019 NOVEL CORONAVIRUS: ICD-10-CM

## 2021-12-29 PROCEDURE — U0005 INFEC AGEN DETEC AMPLI PROBE: HCPCS

## 2021-12-29 PROCEDURE — U0003 INFECTIOUS AGENT DETECTION BY NUCLEIC ACID (DNA OR RNA); SEVERE ACUTE RESPIRATORY SYNDROME CORONAVIRUS 2 (SARS-COV-2) (CORONAVIRUS DISEASE [COVID-19]), AMPLIFIED PROBE TECHNIQUE, MAKING USE OF HIGH THROUGHPUT TECHNOLOGIES AS DESCRIBED BY CMS-2020-01-R: HCPCS

## 2021-12-30 LAB — SARS-COV-2 RNA RESP QL NAA+PROBE: NEGATIVE

## 2022-02-13 ENCOUNTER — HEALTH MAINTENANCE LETTER (OUTPATIENT)
Age: 57
End: 2022-02-13

## 2022-03-21 DIAGNOSIS — E78.5 HYPERLIPIDEMIA LDL GOAL <130: ICD-10-CM

## 2022-03-21 RX ORDER — SIMVASTATIN 20 MG
20 TABLET ORAL AT BEDTIME
Qty: 90 TABLET | Refills: 0 | Status: SHIPPED | OUTPATIENT
Start: 2022-03-21 | End: 2022-06-07

## 2022-03-21 NOTE — TELEPHONE ENCOUNTER
"Routing refill request to provider for review/approval because:  Vivian given x 2 and patient did not follow up, please advise  Patient needs to be seen because it has been more than 1 year since last office visit.      Requested Prescriptions   Pending Prescriptions Disp Refills     simvastatin (ZOCOR) 20 MG tablet 90 tablet 0     Sig: Take 1 tablet (20 mg) by mouth At Bedtime +++NEED ANNUAL EXAM+++       Statins Protocol Failed - 3/21/2022 12:46 PM        Failed - LDL on file in past 12 months     Recent Labs   Lab Test 06/26/20  0907   *             Failed - Recent (12 mo) or future (30 days) visit within the authorizing provider's specialty     Patient has had an office visit with the authorizing provider or a provider within the authorizing providers department within the previous 12 mos or has a future within next 30 days. See \"Patient Info\" tab in inbasket, or \"Choose Columns\" in Meds & Orders section of the refill encounter.              Passed - No abnormal creatine kinase in past 12 months     No lab results found.             Passed - Medication is active on med list        Passed - Patient is age 18 or older        Passed - No active pregnancy on record        Passed - No positive pregnancy test in past 12 months           Gerri CEBALLOS RN on 3/21/2022 at 1:32 PM      "

## 2022-03-21 NOTE — TELEPHONE ENCOUNTER
Reason for call:  Medication   If this is a refill request, has the caller requested the refill from the pharmacy already? yes  Will the patient be using a [facility]? No  Name of the pharmacy and phone number for the current request: [facility] [id number] [place] [facility], [place] - [address]    Name of the medication requested: simvastatin (ZOCOR) 20 MG tablet    Other request: Patient is calling in she is wondering what is going wrong with her pharmacies medication requests for her medications. She is out of her medications.    She was advised that on her last refill that it was recommended for her to follow up. She said she will do the lab appointments if needed. She hasn't been sick so she hasn't needed to be seen. She declined to schedule.    Phone number to reach patient: during the day up until 4:00 pm she would like to be reached at her work number [phone] (W)    Best Time:      Can we leave a detailed message on this number?  YES    Travel screening: Not Applicable

## 2022-06-07 ENCOUNTER — MYC REFILL (OUTPATIENT)
Dept: FAMILY MEDICINE | Facility: CLINIC | Age: 57
End: 2022-06-07
Payer: COMMERCIAL

## 2022-06-07 DIAGNOSIS — E78.5 HYPERLIPIDEMIA LDL GOAL <130: ICD-10-CM

## 2022-06-07 RX ORDER — SIMVASTATIN 20 MG
20 TABLET ORAL AT BEDTIME
Qty: 30 TABLET | Refills: 0 | Status: SHIPPED | OUTPATIENT
Start: 2022-06-07 | End: 2022-07-11

## 2022-06-07 NOTE — TELEPHONE ENCOUNTER
"Requested Prescriptions   Pending Prescriptions Disp Refills     simvastatin (ZOCOR) 20 MG tablet 90 tablet 0     Sig: Take 1 tablet (20 mg) by mouth At Bedtime +++NEED ANNUAL EXAM+++       Statins Protocol Failed - 6/7/2022  1:09 PM        Failed - LDL on file in past 12 months     Recent Labs   Lab Test 06/26/20  0907   *             Failed - Recent (12 mo) or future (30 days) visit within the authorizing provider's specialty     Patient has had an office visit with the authorizing provider or a provider within the authorizing providers department within the previous 12 mos or has a future within next 30 days. See \"Patient Info\" tab in inbasket, or \"Choose Columns\" in Meds & Orders section of the refill encounter.              Passed - No abnormal creatine kinase in past 12 months     No lab results found.             Passed - Medication is active on med list        Passed - Patient is age 18 or older        Passed - No active pregnancy on record        Passed - No positive pregnancy test in past 12 months           Medication is being filled for 1 time refill only due to:  Patient needs labs annual. Patient needs to be seen because it has been more than one year since last visit.    "

## 2022-06-14 ENCOUNTER — MYC MEDICAL ADVICE (OUTPATIENT)
Dept: OBGYN | Facility: CLINIC | Age: 57
End: 2022-06-14
Payer: COMMERCIAL

## 2022-06-17 NOTE — TELEPHONE ENCOUNTER
I could see her next week on Thursday when I am on call just for the breast exam part.  Then keep the annual physical exam appt as well.

## 2022-07-01 ENCOUNTER — OFFICE VISIT (OUTPATIENT)
Dept: OBGYN | Facility: CLINIC | Age: 57
End: 2022-07-01
Payer: COMMERCIAL

## 2022-07-01 VITALS
HEIGHT: 69 IN | OXYGEN SATURATION: 99 % | WEIGHT: 155.5 LBS | HEART RATE: 83 BPM | DIASTOLIC BLOOD PRESSURE: 80 MMHG | BODY MASS INDEX: 23.03 KG/M2 | SYSTOLIC BLOOD PRESSURE: 117 MMHG

## 2022-07-01 DIAGNOSIS — N64.4 MASTALGIA: ICD-10-CM

## 2022-07-01 DIAGNOSIS — E78.5 HYPERLIPIDEMIA LDL GOAL <160: ICD-10-CM

## 2022-07-01 DIAGNOSIS — N60.12 FIBROCYSTIC CHANGES OF LEFT BREAST: Primary | ICD-10-CM

## 2022-07-01 DIAGNOSIS — Z00.00 LABORATORY EXAMINATION ORDERED AS PART OF A ROUTINE GENERAL MEDICAL EXAMINATION: ICD-10-CM

## 2022-07-01 PROCEDURE — 99213 OFFICE O/P EST LOW 20 MIN: CPT | Performed by: OBSTETRICS & GYNECOLOGY

## 2022-07-01 NOTE — PROGRESS NOTES
HPI:  Isela Cartwright is a 57 year old female here for a consultation regarding: evaluation of left breast tenderness in the upper outer quadrant. Started over the past 4 weeks.   She is not taking HRT.   Has not tried anything for it.     She has an annual physical exam appt coming unit(s) pand would like to get her labs drawn prior to that visit and will need to assess for dose change on medications based on cholesterol results.   Last mammogram was in august last yr.     Patient's last menstrual period was 2007.            Past GYN history:   Lab Results   Component Value Date    PAP NIL 2019    PAP NIL 2015    PAP NIL 07/10/2012           Past Medical History:   Diagnosis Date     Recurrent UTI 2009     Tobacco use disorder     quit in        Past Surgical History:   Procedure Laterality Date     COLONOSCOPY       GENITOURINARY SURGERY      burned uretha, chronic urinary tract infections before     SURGICAL HISTORY OF -       urethral dilation from UTI       Family History   Problem Relation Age of Onset     Skin Cancer Mother      Melanoma Mother      Hypertension Father      Hyperlipidemia Father      Cancer - colorectal Maternal Grandmother      Cerebrovascular Disease Maternal Grandmother         TIAs     Skin Cancer Maternal Grandmother      Heart Disease Maternal Grandfather         cardiomegaly;  at 98     Breast Cancer Maternal Aunt         mom's half sister     Breast Cancer Cousin         paternal      Breast Cancer Cousin      C.A.D. No family hx of         no deaths at young age, bypass, etc.     Diabetes No family hx of         father is borderline         Medications:    Current Outpatient Medications:      Calcium Carbonate-Vitamin D (CALCIUM 500 + D PO), Take 1 tablet by mouth daily., Disp: , Rfl:      hydroquinone (KAVIN) 4 % external cream, Use once a day to the affected areas on the right side of your face, Disp: 30 g, Rfl: 1     simvastatin (ZOCOR) 20  "MG tablet, Take 1 tablet (20 mg) by mouth At Bedtime Needs an office appointment with labs for further refills, Disp: 30 tablet, Rfl: 0     tretinoin (RETIN-A) 0.025 % external cream, Use nightly on entire face, Disp: 45 g, Rfl: 3     zolpidem (AMBIEN) 10 MG tablet, Take 1 tablet (10 mg) by mouth nightly as needed for sleep (Patient not taking: Reported on 7/1/2022), Disp: 30 tablet, Rfl: 5    Allergies:  Amoxicillin, Bees, Sulfa drugs, and Tetracycline    ROS:  She denies abnormal vaginal bleeding, discharge or unusual pelvic pain, no dysuria, frequency or hematuria.    EXAM:  /80   Pulse 83   Ht 1.753 m (5' 9\")   Wt 70.5 kg (155 lb 8 oz)   LMP 07/13/2007   SpO2 99%   BMI 22.96 kg/m      General - pleasant female in no acute distress.  Neurological - Alert and oriented  Psych:  normal mood and affect.  normal respiratory and cardiovascular effort   Breast -  No masses, nipple discharge or lymph nodes palpable. There is mild fibrocystic changes in the left upper outer quadrant noted.    No axillary lymph nodes palpable.    .    ASSESSMENT:  Encounter Diagnoses   Name Primary?     Fibrocystic changes of left breast Yes     Laboratory examination ordered as part of a routine general medical examination      Hyperlipidemia LDL goal <160      Mastalgia         PLAN:   discussed dietary changes to decrease fibrocystic breast tissue.  Foods to avoid, well fitting bra.   Will get next mammogram this coming august.  Patient reassured no findings concerning for malignancy at this time.   Follow-up next month with her annual physical exam     Orders Placed This Encounter   Procedures     Vitamin D Deficiency     Lipid Profile     TSH with free T4 reflex     Comprehensive metabolic panel     CBC with platelets       Elizabeth Wall MD     "

## 2022-07-07 ENCOUNTER — LAB (OUTPATIENT)
Dept: LAB | Facility: CLINIC | Age: 57
End: 2022-07-07
Payer: COMMERCIAL

## 2022-07-07 DIAGNOSIS — Z00.00 LABORATORY EXAMINATION ORDERED AS PART OF A ROUTINE GENERAL MEDICAL EXAMINATION: ICD-10-CM

## 2022-07-07 DIAGNOSIS — E78.5 HYPERLIPIDEMIA LDL GOAL <160: ICD-10-CM

## 2022-07-07 LAB
ALBUMIN SERPL-MCNC: 4.2 G/DL (ref 3.4–5)
ALP SERPL-CCNC: 79 U/L (ref 40–150)
ALT SERPL W P-5'-P-CCNC: 24 U/L (ref 0–50)
ANION GAP SERPL CALCULATED.3IONS-SCNC: 7 MMOL/L (ref 3–14)
AST SERPL W P-5'-P-CCNC: 16 U/L (ref 0–45)
BILIRUB SERPL-MCNC: 0.6 MG/DL (ref 0.2–1.3)
BUN SERPL-MCNC: 10 MG/DL (ref 7–30)
CALCIUM SERPL-MCNC: 9.6 MG/DL (ref 8.5–10.1)
CHLORIDE BLD-SCNC: 107 MMOL/L (ref 94–109)
CHOLEST SERPL-MCNC: 214 MG/DL
CO2 SERPL-SCNC: 25 MMOL/L (ref 20–32)
CREAT SERPL-MCNC: 0.73 MG/DL (ref 0.52–1.04)
DEPRECATED CALCIDIOL+CALCIFEROL SERPL-MC: 31 UG/L (ref 20–75)
ERYTHROCYTE [DISTWIDTH] IN BLOOD BY AUTOMATED COUNT: 11.9 % (ref 10–15)
FASTING STATUS PATIENT QL REPORTED: YES
GFR SERPL CREATININE-BSD FRML MDRD: >90 ML/MIN/1.73M2
GLUCOSE BLD-MCNC: 102 MG/DL (ref 70–99)
HCT VFR BLD AUTO: 39.4 % (ref 35–47)
HDLC SERPL-MCNC: 62 MG/DL
HGB BLD-MCNC: 13.8 G/DL (ref 11.7–15.7)
LDLC SERPL CALC-MCNC: 131 MG/DL
MCH RBC QN AUTO: 35 PG (ref 26.5–33)
MCHC RBC AUTO-ENTMCNC: 35 G/DL (ref 31.5–36.5)
MCV RBC AUTO: 100 FL (ref 78–100)
NONHDLC SERPL-MCNC: 152 MG/DL
PLATELET # BLD AUTO: 225 10E3/UL (ref 150–450)
POTASSIUM BLD-SCNC: 4.3 MMOL/L (ref 3.4–5.3)
PROT SERPL-MCNC: 7.4 G/DL (ref 6.8–8.8)
RBC # BLD AUTO: 3.94 10E6/UL (ref 3.8–5.2)
SODIUM SERPL-SCNC: 139 MMOL/L (ref 133–144)
T4 FREE SERPL-MCNC: 0.79 NG/DL (ref 0.76–1.46)
TRIGL SERPL-MCNC: 104 MG/DL
TSH SERPL DL<=0.005 MIU/L-ACNC: 4.46 MU/L (ref 0.4–4)
WBC # BLD AUTO: 4.2 10E3/UL (ref 4–11)

## 2022-07-07 PROCEDURE — 84439 ASSAY OF FREE THYROXINE: CPT

## 2022-07-07 PROCEDURE — 80061 LIPID PANEL: CPT

## 2022-07-07 PROCEDURE — 85027 COMPLETE CBC AUTOMATED: CPT

## 2022-07-07 PROCEDURE — 82306 VITAMIN D 25 HYDROXY: CPT

## 2022-07-07 PROCEDURE — 80053 COMPREHEN METABOLIC PANEL: CPT

## 2022-07-07 PROCEDURE — 84443 ASSAY THYROID STIM HORMONE: CPT

## 2022-07-07 PROCEDURE — 36415 COLL VENOUS BLD VENIPUNCTURE: CPT

## 2022-07-10 DIAGNOSIS — E78.5 HYPERLIPIDEMIA LDL GOAL <130: ICD-10-CM

## 2022-07-11 RX ORDER — SIMVASTATIN 20 MG
20 TABLET ORAL AT BEDTIME
Qty: 30 TABLET | Refills: 0 | Status: SHIPPED | OUTPATIENT
Start: 2022-07-11 | End: 2022-07-26

## 2022-07-11 NOTE — TELEPHONE ENCOUNTER
"Requested Prescriptions   Pending Prescriptions Disp Refills     simvastatin (ZOCOR) 20 MG tablet [Pharmacy Med Name: SIMVASTATIN 20 MG TABLET] 30 tablet 0     Sig: TAKE 1 TABLET (20 MG) BY MOUTH AT BEDTIME NEEDS AN OFFICE APPOINTMENT WITH LABS FOR FURTHER REFILLS       Statins Protocol Failed - 7/10/2022 10:30 AM        Failed - Recent (12 mo) or future (30 days) visit within the authorizing provider's specialty     Patient has had an office visit with the authorizing provider or a provider within the authorizing providers department within the previous 12 mos or has a future within next 30 days. See \"Patient Info\" tab in inbasket, or \"Choose Columns\" in Meds & Orders section of the refill encounter.              Passed - LDL on file in past 12 months     Recent Labs   Lab Test 07/07/22  0741   *             Passed - No abnormal creatine kinase in past 12 months     No lab results found.             Passed - Medication is active on med list        Passed - Patient is age 18 or older        Passed - No active pregnancy on record        Passed - No positive pregnancy test in past 12 months           Routing refill request to provider for review/approval because:  Patient needs to be seen because it has been more than 1 year since last office visit.    Thanks,  STANTON Guerra  [facility]           "

## 2022-07-26 ENCOUNTER — OFFICE VISIT (OUTPATIENT)
Dept: OBGYN | Facility: CLINIC | Age: 57
End: 2022-07-26
Payer: COMMERCIAL

## 2022-07-26 VITALS
DIASTOLIC BLOOD PRESSURE: 84 MMHG | HEIGHT: 69 IN | HEART RATE: 80 BPM | BODY MASS INDEX: 23.31 KG/M2 | SYSTOLIC BLOOD PRESSURE: 125 MMHG | WEIGHT: 157.4 LBS

## 2022-07-26 DIAGNOSIS — R79.89 ELEVATED TSH: Primary | ICD-10-CM

## 2022-07-26 DIAGNOSIS — Z00.00 LABORATORY EXAMINATION ORDERED AS PART OF A ROUTINE GENERAL MEDICAL EXAMINATION: ICD-10-CM

## 2022-07-26 DIAGNOSIS — E78.5 HYPERLIPIDEMIA LDL GOAL <130: ICD-10-CM

## 2022-07-26 DIAGNOSIS — R73.01 ELEVATED FASTING GLUCOSE: ICD-10-CM

## 2022-07-26 PROCEDURE — 99214 OFFICE O/P EST MOD 30 MIN: CPT | Mod: 25 | Performed by: OBSTETRICS & GYNECOLOGY

## 2022-07-26 PROCEDURE — 99396 PREV VISIT EST AGE 40-64: CPT | Performed by: OBSTETRICS & GYNECOLOGY

## 2022-07-26 RX ORDER — SIMVASTATIN 20 MG
20 TABLET ORAL AT BEDTIME
Qty: 60 TABLET | Refills: 0 | Status: SHIPPED | OUTPATIENT
Start: 2022-07-26 | End: 2022-09-28

## 2022-07-26 NOTE — PROGRESS NOTES
HPI:  Isela Cartwright is a 57 year old female here for a follow-up visit.   She was due for routine exam but prefers to defer the exam. She would like to review her recent lab work and routine  health care maintenance.    TSH was elevated  Cholesterol is slightly elevated but stable compared to previous years.  She is taking simvastatin and would like a refill of that.  Vitamin D was borderline low.  she started taking a supplement for that  Fasting glucose was 102, previously has been 70s and 80s  She has a normal BMI  She is due for mammogram next month  Will be due for colonoscopy next year    Her prior primary care provider left the clinic, she will be establishing care with a new primary care provider within the next few months.    Wt Readings from Last 4 Encounters:   22 71.4 kg (157 lb 6.4 oz)   22 70.5 kg (155 lb 8 oz)   20 71.2 kg (157 lb)   10/06/20 69.6 kg (153 lb 8 oz)         Patient's last menstrual period was 2007.            Past GYN history:   Lab Results   Component Value Date    PAP NIL 2019    PAP NIL 2015    PAP NIL 07/10/2012           Past Medical History:   Diagnosis Date     Recurrent UTI 2009     Tobacco use disorder     quit in        Past Surgical History:   Procedure Laterality Date     COLONOSCOPY       GENITOURINARY SURGERY      burned uretha, chronic urinary tract infections before     SURGICAL HISTORY OF -       urethral dilation from UTI       Family History   Problem Relation Age of Onset     Skin Cancer Mother      Melanoma Mother      Hypertension Father      Hyperlipidemia Father      Cancer - colorectal Maternal Grandmother      Cerebrovascular Disease Maternal Grandmother         TIAs     Skin Cancer Maternal Grandmother      Heart Disease Maternal Grandfather         cardiomegaly;  at 98     Breast Cancer Maternal Aunt         mom's half sister     Breast Cancer Cousin         paternal      Breast Cancer Cousin       "C.A.D. No family hx of         no deaths at young age, bypass, etc.     Diabetes No family hx of         father is borderline         Medications:    Current Outpatient Medications:      Calcium Carbonate-Vitamin D (CALCIUM 500 + D PO), Take 1 tablet by mouth daily., Disp: , Rfl:      hydroquinone (KAVIN) 4 % external cream, Use once a day to the affected areas on the right side of your face, Disp: 30 g, Rfl: 1     simvastatin (ZOCOR) 20 MG tablet, TAKE 1 TABLET (20 MG) BY MOUTH AT BEDTIME NEEDS AN OFFICE APPOINTMENT WITH LABS FOR FURTHER REFILLS, Disp: 30 tablet, Rfl: 0     tretinoin (RETIN-A) 0.025 % external cream, Use nightly on entire face, Disp: 45 g, Rfl: 3     zolpidem (AMBIEN) 10 MG tablet, Take 1 tablet (10 mg) by mouth nightly as needed for sleep, Disp: 30 tablet, Rfl: 5    Allergies:  Amoxicillin, Bees, Sulfa drugs, and Tetracycline    ROS:  She denies abnormal vaginal bleeding, discharge or unusual pelvic pain, no dysuria, frequency or hematuria.    EXAM:  /84   Pulse 80   Ht 1.753 m (5' 9\")   Wt 71.4 kg (157 lb 6.4 oz)   LMP 07/13/2007   BMI 23.24 kg/m      General - pleasant female in no acute distress.  Neurological - Alert and oriented  Psych:  normal mood and affect.  normal respiratory and cardiovascular effort   Patient declined full exam today.     ASSESSMENT/PLAN:  (R79.89) Elevated TSH  (primary encounter diagnosis)  Comment: discussed causes, might just need more Iodine   Plan: try supplementing with iodine tincture and repeat the labs in 2 months, if still elevated will need further laboratory evaluation.    (E78.5) Hyperlipidemia LDL goal <130  Comment: Reviewed labs seem to be stable on the current dose also discussed diet and lifestyle for cholesterol improvement,  Plan: simvastatin (ZOCOR) 20 MG tablet  Patient was given additional 2 months but after that we will need to establish care with a new primary care provider for ongoing management.    (R73.01) Elevated fasting " glucose  Comment: This is a new finding for her.  Borderline elevated fasting blood glucose  Plan: Discussed cardiovascular risk factors, improve diet exercise and repeat with next lab draw in 2 months    (Z00.00) Laboratory examination ordered as part of a routine general medical examination  Comment:    Plan: TSH with free T4 reflex, Glucose, Hemoglobin         A1c, Vitamin D Deficiency        Elizabeth Wall MD

## 2022-08-10 ENCOUNTER — ANCILLARY PROCEDURE (OUTPATIENT)
Dept: MAMMOGRAPHY | Facility: CLINIC | Age: 57
End: 2022-08-10
Payer: COMMERCIAL

## 2022-08-10 DIAGNOSIS — Z12.31 VISIT FOR SCREENING MAMMOGRAM: ICD-10-CM

## 2022-08-10 PROCEDURE — 77067 SCR MAMMO BI INCL CAD: CPT | Mod: GC | Performed by: RADIOLOGY

## 2022-08-10 PROCEDURE — 77063 BREAST TOMOSYNTHESIS BI: CPT | Mod: GC | Performed by: RADIOLOGY

## 2022-09-21 ENCOUNTER — LAB (OUTPATIENT)
Dept: LAB | Facility: CLINIC | Age: 57
End: 2022-09-21
Payer: COMMERCIAL

## 2022-09-21 DIAGNOSIS — Z00.00 LABORATORY EXAMINATION ORDERED AS PART OF A ROUTINE GENERAL MEDICAL EXAMINATION: ICD-10-CM

## 2022-09-21 LAB
FASTING STATUS PATIENT QL REPORTED: YES
GLUCOSE BLD-MCNC: 96 MG/DL (ref 70–99)
HBA1C MFR BLD: 5.4 % (ref 0–5.6)
TSH SERPL DL<=0.005 MIU/L-ACNC: 3.58 MU/L (ref 0.4–4)

## 2022-09-21 PROCEDURE — 82947 ASSAY GLUCOSE BLOOD QUANT: CPT

## 2022-09-21 PROCEDURE — 83036 HEMOGLOBIN GLYCOSYLATED A1C: CPT

## 2022-09-21 PROCEDURE — 84443 ASSAY THYROID STIM HORMONE: CPT

## 2022-09-21 PROCEDURE — 36415 COLL VENOUS BLD VENIPUNCTURE: CPT

## 2022-09-28 ENCOUNTER — OFFICE VISIT (OUTPATIENT)
Dept: FAMILY MEDICINE | Facility: CLINIC | Age: 57
End: 2022-09-28
Payer: COMMERCIAL

## 2022-09-28 VITALS
TEMPERATURE: 99 F | SYSTOLIC BLOOD PRESSURE: 122 MMHG | BODY MASS INDEX: 23.11 KG/M2 | WEIGHT: 156 LBS | OXYGEN SATURATION: 99 % | HEART RATE: 85 BPM | HEIGHT: 69 IN | DIASTOLIC BLOOD PRESSURE: 76 MMHG

## 2022-09-28 DIAGNOSIS — R79.89 ELEVATED TSH: ICD-10-CM

## 2022-09-28 DIAGNOSIS — Z76.89 ENCOUNTER TO ESTABLISH CARE WITH NEW DOCTOR: Primary | ICD-10-CM

## 2022-09-28 DIAGNOSIS — Z87.891 PERSONAL HISTORY OF TOBACCO USE: ICD-10-CM

## 2022-09-28 DIAGNOSIS — E78.5 HYPERLIPIDEMIA LDL GOAL <130: ICD-10-CM

## 2022-09-28 PROCEDURE — 99213 OFFICE O/P EST LOW 20 MIN: CPT | Mod: 25 | Performed by: STUDENT IN AN ORGANIZED HEALTH CARE EDUCATION/TRAINING PROGRAM

## 2022-09-28 PROCEDURE — G0296 VISIT TO DETERM LDCT ELIG: HCPCS | Performed by: STUDENT IN AN ORGANIZED HEALTH CARE EDUCATION/TRAINING PROGRAM

## 2022-09-28 RX ORDER — SIMVASTATIN 20 MG
20 TABLET ORAL AT BEDTIME
Qty: 90 TABLET | Refills: 3 | Status: SHIPPED | OUTPATIENT
Start: 2022-09-28 | End: 2023-10-04

## 2022-09-28 NOTE — PATIENT INSTRUCTIONS
Please call to schedule your imaging at: [phone]       Make a lab only appointment for 3 months from now to check your thyroid.       Lung Cancer Screening   Frequently Asked Questions  If you are at high-risk for lung cancer, getting screened with low-dose computed tomography (LDCT) every year can help save your life. This handout offers answers to some of the most common questions about lung cancer screening. If you have other questions, please call [phone] ([phone]).     What is it?  Lung cancer screening uses special X-ray technology to create an image of your lung tissue. The exam is quick and easy and takes less than 10 seconds. We don t give you any medicine or use any needles. You can eat before and after the exam. You don t need to change your clothes as long as the clothing on your chest doesn t contain metal. But, you do need to be able to hold your breath for at least 6 seconds during the exam.    What is the goal of lung cancer screening?  The goal of lung cancer screening is to save lives. Many times, lung cancer is not found until a person starts having physical symptoms. Lung cancer screening can help detect lung cancer in the earliest stages when it may be easier to treat.    Who should be screened for lung cancer?  We suggest lung cancer screening for anyone who is at high-risk for lung cancer. You are in the high-risk group if you:     are between the ages of 55 and 79, and   have smoked at least 1 pack of cigarettes a day for 20 or more years, and   still smoke or have quit within the past 15 years.    However, if you have a new cough or shortness of breath, you should talk to your doctor before being screened.    Why does it matter if I have symptoms?  Certain symptoms can be a sign that you have a condition in your lungs that should be checked and treated by your doctor. These symptoms include fever, chest pain, a new or changing cough, shortness of breath that you have  never felt before, coughing up blood or unexplained weight loss. Having any of these symptoms can greatly affect the results of lung cancer screening.       Should all smokers get an LDCT lung cancer screening exam?  It depends. Lung cancer screening is for a very specific group of men and women who have a history of heavy smoking over a long period of time (see  Who should be screened for lung cancer  above).  I am in the high-risk group, but have been diagnosed with cancer in the past. Is LDCT lung cancer screening right for me?  In some cases, you should not have LDCT lung screening, such as when your doctor is already following your cancer with CT scan studies. Your doctor will help you decide if LDCT lung screening is right for you.  Do I need to have a screening exam every year?  Yes. If you are in the high-risk group described earlier, you should get an LDCT lung cancer screening exam every year until you are 79, or are no longer willing or able to undergo screening and possible procedures to diagnose and treat lung cancer.  How effective is LDCT at preventing death from lung cancer?  Studies have shown that LDCT lung cancer screening can lower the risk of death from lung cancer by 20 percent in people who are at high-risk.  What are the risks?  There are some risks and limitations of LDCT lung cancer screening. We want to make sure you understand the risks and benefits, so please let us know if you have any questions. Your doctor may want to talk with you more about these risks.   Radiation exposure: As with any exam that uses radiation, there is a very small increased risk of cancer. The amount of radiation in LDCT is small--about the same amount a person would get from a mammogram. Your doctor orders the exam when he or she feels the potential benefits outweigh the risks.   False negatives: No test is perfect, including LDCT. It is possible that you may have a medical condition, including lung cancer, that  is not found during your exam. This is called a false negative result.   False positives and more testing: LDCT very often finds something in the lung that could be cancer, but in fact is not. This is called a false positive result. False positive tests often cause anxiety. To make sure these findings are not cancer, you may need to have more tests. These tests will be done only if you give us permission. Sometimes patients need a treatment that can have side effects, such as a biopsy. For more information on false positives, see  What can I expect from the results?    Findings not related to lung cancer: Your LDCT exam also takes pictures of areas of your body next to your lungs. In a very small number of cases, the CT scan will show an abnormal finding in one of these areas, such as your kidneys, adrenal glands, liver or thyroid. This finding may not be serious, but you may need more tests. Your doctor can help you decide what other tests you may need, if any.  What can I expect from the results?  About 1 out of 4 LDCT exams will find something that may need more tests. Most of the time, these findings are lung nodules. Lung nodules are very small collections of tissue in the lung. These nodules are very common, and the vast majority--more than 97 percent--are not cancer (benign). Most are normal lymph nodes or small areas of scarring from past infections.  But, if a small lung nodule is found to be cancer, the cancer can be cured more than 90 percent of the time. To know if the nodule is cancer, we may need to get more images before your next yearly screening exam. If the nodule has suspicious features (for example, it is large, has an odd shape or grows over time), we will refer you to a specialist for further testing.  Will my doctor also get the results?  Yes. Your doctor will get a copy of your results.  Is it okay to keep smoking now that there s a cancer screening exam?  No. Tobacco is one of the strongest  cancer-causing agents. It causes not only lung cancer, but other cancers and cardiovascular (heart) diseases as well. The damage caused by smoking builds over time. This means that the longer you smoke, the higher your risk of disease. While it is never too late to quit, the sooner you quit, the better.  Where can I find help to quit smoking?  The best way to prevent lung cancer is to stop smoking. If you have already quit smoking, congratulations and keep it up! For help on quitting smoking, please call [employer] at 8-170-QUITNOW (1-654.638.8347) or the [employer] at 1-546.501.9462 to find local resources near you.  One-on-one health coaching:  If you d prefer to work individually with a health care provider on tobacco cessation, we offer:     Medication Therapy Management:  Our specially trained pharmacists work closely with you and your doctor to help you quit smoking.  Call [phone] or [phone] (toll free).

## 2022-09-28 NOTE — PROGRESS NOTES
Assessment & Plan     Encounter to establish care with new doctor    Hyperlipidemia LDL goal <130  Reviewed recent cholesterol labs. Slight improvement from previous but overall stable. Continue zocor.   - simvastatin (ZOCOR) 20 MG tablet; Take 1 tablet (20 mg) by mouth At Bedtime Needs an office appointment with labs for further refills    Personal history of tobacco use  20 pack year smoking hx, quit 14 years ago. Asymptomatic. Discussed starting lung cancer screening and she would like to proceed. Ordered low dose CT chest  - Prof fee: Shared Decision Making for Lung Cancer Screening  - CT Chest Lung Cancer Scrn Low Dose wo; Future    Elevated TSH  Repeat TSH was wnl on labs last week. Has been on/off iodine. Recommend repeating TSH in 3 months, if elevated again then will check TPO ab  - TSH with free T4 reflex; Future    Return in about 1 year (around 9/28/2023) for Routine preventive.    Vivian Cain DO  [facility]    Rafa Weiss is a 57 year old, presenting for the following health issues:  Establish Care and Lipids      History of Present Illness       Reason for visit:  Need to choose a new primary care physician    She eats 2-3 servings of fruits and vegetables daily.She consumes 0 sweetened beverage(s) daily.She exercises with enough effort to increase her heart rate 30 to 60 minutes per day.  She exercises with enough effort to increase her heart rate 3 or less days per week. She is missing 1 dose(s) of medications per week.  She is not taking prescribed medications regularly due to remembering to take.         Hyperlipidemia Follow-Up      Are you regularly taking any medication or supplement to lower your cholesterol?   Yes- Simvastatin 20 mg    Are you having muscle aches or other side effects that you think could be caused by your cholesterol lowering medication?  No    Had labs done 09/21/2022  Discuss Thyroid   Took iodine at first when TSH elevated, but has  Hospitalist Progress Note  [facility]     Patient: Penny Argueta  : 1931  MRN: [id number]  Code Status: [facility]    Hospital Day: 9   Date of Service: 10/5/2020    Subjective:   Pt seen in COVID-19 unit. Resting comfortably. No acute distress. Past Medical History:   Diagnosis Date    Acid reflux     Hypertension     Macular degeneration     Dry Type    OA (osteoarthritis)     knees    Palliative care patient 2020       Past Surgical History:   Procedure Laterality Date    ANKLE SURGERY Right     CARPAL TUNNEL RELEASE Left     CHOLECYSTECTOMY      TOTAL HIP ARTHROPLASTY Bilateral     TUBAL LIGATION      UPPER GASTROINTESTINAL ENDOSCOPY  2020    Dr Chase Torres poster wall duodenal ulcer in the setting of Motrin       Family History   Problem Relation Age of Onset    Diabetes Mother     Cancer Father         prostate metastatic    Cancer Sister         breast    Glaucoma Son     Elevated Lipids Son     Arthritis Daughter     Elevated Lipids Daughter        Social History     Socioeconomic History    Marital status:       Spouse name: Not on file    Number of children: 5    Years of education: Not on file    Highest education level: Not on file   Occupational History    Occupation: house wife   Social Needs    Financial resource strain: Not on file    Food insecurity     Worry: Not on file     Inability: Not on file   [employer] needs     Medical: Not on file     Non-medical: Not on file   Tobacco Use    Smoking status: Former Smoker    Smokeless tobacco: Never Used    Tobacco comment: \"smoked a few ciggaerttes once and a while\" as per daughter, Nathan Saavedra won't tell that\" as per her son   Substance and Sexual Activity    Alcohol use: Yes     Comment: rarely has a sip of wine    Drug use: Never    Sexual activity: Not on file     Comment: had 9 kids over ten and a half years   Lifestyle    Physical activity     Days per week: Not "been off/on with takingit    Lung cancer screening-  Former smoker. Quit at age 43.   Smoked for 20 years. On average1 PPD.   No SOB, WALTERS, cough, hemoptysis, wheezing.     Does aerobics twice per week.   No Chest pain with exertion.       Ambien - only uses when goes on vacation. Still has some at home, uses infrequently.   Had labs done with OBGYN a couple months ago.     TSH was elevated a couple months ago; repeat TSH wnl.     Cholesterol is slightly elevated - taking simvastatin    On Vitamin D supplement - was borderline low.      Borderline elevated fasting BG     mammogram 8/10/22 - wnl    Acne - follows with derm for acne and age spots.     Will be due for colonoscopy next year          Review of Systems   Constitutional, HEENT, cardiovascular, pulmonary, gi and gu systems are negative, except as otherwise noted.      Objective    /76 (BP Location: Right arm, Patient Position: Sitting, Cuff Size: Adult Regular)   Pulse 85   Temp 99  F (37.2  C) (Oral)   Ht 1.753 m (5' 9\")   Wt 70.8 kg (156 lb)   LMP 07/13/2007   SpO2 99%   BMI 23.04 kg/m    Body mass index is 23.04 kg/m .  Physical Exam   GENERAL: healthy, alert and no distress  EYES: Eyes grossly normal to inspection, PERRL and conjunctivae and sclerae normal  HENT: ear canals and TM's normal, nose and mouth without ulcers or lesions  NECK: no adenopathy, no asymmetry, masses, or scars and thyroid normal to palpation  RESP: lungs clear to auscultation - no rales, rhonchi or wheezes  CV: regular rate and rhythm, normal S1 S2, no S3 or S4, no murmur, click or rub, no peripheral edema and peripheral pulses strong  ABDOMEN: soft, nontender, no hepatosplenomegaly, no masses and bowel sounds normal  SKIN: no suspicious lesions or rashes  PSYCH: mentation appears normal, affect normal/bright                Lung Cancer Screening Shared Decision Making Visit     Isela Cartwright, a 57 year old female, is eligible for lung cancer screening    History "   Smoking Status     Former Smoker     Packs/day: 0.50     Years: 15.00     Types: Cigarettes     Quit date: 1/6/2008   Smokeless Tobacco     Never Used       I have discussed with patient the risks and benefits of screening for lung cancer with low-dose CT.     The risks include:    radiation exposure: one low dose chest CT has as much ionizing radiation as about 15 chest x-rays, or 6 months of background radiation living in [place]      false positives: most findings/nodules are NOT cancer, but some might still require additional diagnostic evaluation, including biopsy    over-diagnosis: some slow growing cancers that might never have been clinically significant will be detected and treated unnecessarily     The benefit of early detection of lung cancer is contingent upon adherence to annual screening or more frequent follow up if indicated.     Furthermore, to benefit from screening, Isela must be willing and able to undergo diagnostic procedures, if indicated. Although no specific guide is available for determining severity of comorbidities, it is reasonable to withhold screening in patients who have greater mortality risk from other diseases.     We did discuss that the best way to prevent lung cancer is to not smoke.    Some patients may value a numeric estimation of lung cancer risk when evaluating if lung cancer screening is right for them, here is one calculator:    ShouldIScreen   10/05/20 1712    loperamide (IMODIUM) capsule 4 mg  4 mg Oral 4x Daily PRN Raydell Fought, DO        amLODIPine (NORVASC) tablet 5 mg  5 mg Oral Daily Raydell Fought, DO   5 mg at 10/05/20 9580    melatonin tablet 10 mg  10 mg Oral Daily Raydell Fought, DO   10 mg at 10/05/20 0815    montelukast (SINGULAIR) tablet 10 mg  10 mg Oral Nightly Raydell Fought, DO   10 mg at 10/04/20 1938    vitamin D (CHOLECALCIFEROL) capsule 5,000 Units  5,000 Units Oral Daily Raydell Fought, DO   5,000 Units at 10/05/20 3730    vitamin D (ERGOCALCIFEROL) capsule 50,000 Units  50,000 Units Oral Weekly Raydell Fought, DO   50,000 Units at 10/04/20 6506    zinc sulfate (ZINCATE) capsule 50 mg  50 mg Oral Daily Raydell Fought, DO   50 mg at 10/05/20 4846    sucralfate (CARAFATE) tablet 1 g  1 g Oral 4 times per day Roxie Scott MD   1 g at 10/05/20 1712    sodium chloride flush 0.9 % injection 10 mL  10 mL Intravenous 2 times per day Raydell Fought, DO   10 mL at 10/05/20 0818    sodium chloride flush 0.9 % injection 10 mL  10 mL Intravenous PRN Raydell Fought, DO        acetaminophen (TYLENOL) tablet 650 mg  650 mg Oral Q6H PRN Raydell Fought, DO        Or    acetaminophen (TYLENOL) suppository 650 mg  650 mg Rectal Q6H PRN Raydell Fought, DO        ondansetron (ZOFRAN-ODT) disintegrating tablet 4 mg  4 mg Oral Q8H PRN Raydell Fought, DO        Or    ondansetron [facility] injection 4 mg  4 mg Intravenous Q6H PRN Raydell Fought, DO                 Objective:   /81   Pulse 72   Temp 97.1 °F (36.2 °C) (Temporal)   Resp 16   Ht 5' 3\" (1.6 m)   Wt 115 lb 9.6 oz (52.4 kg)   SpO2 90%   BMI 20.48 kg/m²     Exam not performed in an effort to preserve PPE however case discussed in detail with unit staff    Recent Labs     10/03/20  0430 10/04/20  0235 10/05/20  0255   WBC 15.5* 13.1* 13.0*   RBC 3.46* 3.40* 3.30*   HGB 10.8* 10.7* 10.4*   HCT 32.0* 30.6* 29.8*   MCV 92.5 90.0 90.3   MCH 31.2* 31.5* 31.5*   MCHC 33.8 35.0 34.9    330 334     Recent Labs     10/03/20  0430 10/04/20  0235 10/05/20  0255   * 132* 131*   K 3.0* 3.1* 3.3*   ANIONGAP 13 13 10   CL 94* 95* 95*   CO2 25 24 26   BUN 6* 5* 4*   CREATININE 0.5 0.4* 0.5   GLUCOSE 82 90 92   CALCIUM 9.2 9.0 9.3     Recent Labs     10/03/20  0430 10/04/20  0235 10/05/20  0255   MG 1.6* 1.5* 1.5*     No results for input(s): AST, ALT, ALB, BILITOT, ALKPHOS, ALB in the last 72 hours. No results for input(s): PH, PO2, PCO2, HCO3, BE, O2SAT in the last 72 hours. No results for input(s): TROPONINI in the last 72 hours. No results for input(s): INR in the last 72 hours. No results for input(s): LACTA in the last 72 hours. Intake/Output Summary (Last 24 hours) at 10/5/2020 1758  Last data filed at 10/5/2020 0600  Gross per 24 hour   Intake 300 ml   Output 0 ml   Net 300 ml       No results found.      Assessment and Plan:   GI bleed  GI following  EGD demonstrated large duodenal ulcer  Patient counseled on Motrin usage  Protonix/sucralfate  Follow hemoglobin  GI since signed off     COVID-19 infection  Diagnosed via screening test prior to EGD  Remains asymptomatic  Supportive care  Infection control following     Moderate malnutrition  Dietitian recs followed     DVT prophylaxis  SCDs     Dispo  Patient does not qualify for home hospice  Home health care pending  Social work/palliative care/ following    Sania Crespo MD   10/5/2020 5:58 PM

## 2022-10-05 ENCOUNTER — ANCILLARY PROCEDURE (OUTPATIENT)
Dept: CT IMAGING | Facility: CLINIC | Age: 57
End: 2022-10-05
Attending: STUDENT IN AN ORGANIZED HEALTH CARE EDUCATION/TRAINING PROGRAM
Payer: COMMERCIAL

## 2022-10-05 DIAGNOSIS — Z87.891 PERSONAL HISTORY OF TOBACCO USE: ICD-10-CM

## 2022-10-05 PROCEDURE — 71271 CT THORAX LUNG CANCER SCR C-: CPT | Performed by: RADIOLOGY

## 2022-10-15 ENCOUNTER — HEALTH MAINTENANCE LETTER (OUTPATIENT)
Age: 57
End: 2022-10-15

## 2022-11-18 ENCOUNTER — IMMUNIZATION (OUTPATIENT)
Dept: FAMILY MEDICINE | Facility: CLINIC | Age: 57
End: 2022-11-18
Payer: COMMERCIAL

## 2022-11-18 PROCEDURE — 0124A COVID-19,PF,PFIZER BOOSTER BIVALENT: CPT

## 2022-11-18 PROCEDURE — 91312 COVID-19,PF,PFIZER BOOSTER BIVALENT: CPT

## 2022-12-13 ENCOUNTER — MYC MEDICAL ADVICE (OUTPATIENT)
Dept: DERMATOLOGY | Facility: CLINIC | Age: 57
End: 2022-12-13

## 2022-12-13 NOTE — TELEPHONE ENCOUNTER
If she is able to come in on Wednesday the 14th (tomorrow!)  There is a [facility] slot available.    Thanks, Nidhi

## 2022-12-14 ENCOUNTER — OFFICE VISIT (OUTPATIENT)
Dept: DERMATOLOGY | Facility: CLINIC | Age: 57
End: 2022-12-14
Payer: COMMERCIAL

## 2022-12-14 DIAGNOSIS — L82.0 INFLAMED SEBORRHEIC KERATOSIS: ICD-10-CM

## 2022-12-14 DIAGNOSIS — L57.0 ACTINIC KERATOSIS: Primary | ICD-10-CM

## 2022-12-14 DIAGNOSIS — L81.4 SOLAR LENTIGINOSIS: ICD-10-CM

## 2022-12-14 DIAGNOSIS — L82.1 SEBORRHEIC KERATOSES: ICD-10-CM

## 2022-12-14 PROCEDURE — 17110 DESTRUCTION B9 LES UP TO 14: CPT | Performed by: PHYSICIAN ASSISTANT

## 2022-12-14 PROCEDURE — 17000 DESTRUCT PREMALG LESION: CPT | Mod: 59 | Performed by: PHYSICIAN ASSISTANT

## 2022-12-14 ASSESSMENT — PAIN SCALES - GENERAL: PAINLEVEL: NO PAIN (0)

## 2022-12-14 NOTE — LETTER
12/14/2022       RE: Isela Cartwright  [address]  [facility] [address]     Dear Colleague,    Thank you for referring your patient, Isela Cartwright, to the [facility] [place] at [facility]. Please see a copy of my visit note below.    [facility] Dermatology Note  Encounter Date: Dec 14, 2022  Office Visit     Dermatology Problem List:  1. Acne Vulgaris - face - tretinoin 0.025% cream nightly (refilled by PCP)  2. Family history of melanoma and NMSC (mother)  - last TBSE 10/27/2021   # Solar lentigines  - Hydroquinone 4% cream   3. AK s/p cryo   4. AK with adjacent SK s/p cryo    ____________________________________________    Assessment & Plan:    # Actinic keratosis with adjacent seborrheic keratosis, left lateral cheek  - Cryotherapy performed today, see procedure below.     Procedures Performed:   - Cryotherapy procedure note, location(s): Left lateral cheek. After verbal consent and discussion of risks and benefits including, but not limited to, dyspigmentation/scar, blister, and pain, 2 lesion(s) was(were) treated with 1-2 mm freeze border for 1-2 cycles with liquid nitrogen. Post cryotherapy instructions were provided.      Follow-up: 4 month(s) in-person, or earlier for new or changing lesions    Staff and Scribe:     Scribe Disclosure:  I, Hemant Newman, am serving as a scribe to document services personally performed by Nidhi Trevino PA-C based on data collection and the provider's statements to me.     Provider Disclosure:   The documentation recorded by the scribe accurately reflects the services I personally performed and the decisions made by me.    All risks, benefits and alternatives were discussed with patient.  Patient is in agreement and understands the assessment and plan.  All questions were answered.  Sun Screen Education was given.   Return to Clinic in 4 months or sooner as needed.    Nidhi Trevino PA-C   [facility]   ____________________________________________    CC: No chief complaint on file.    HPI:  Ms. Isela Cartwright is a(n) 57 year old female who presents today as a return patient for a spot check on the left cheek. Last seen by me on 10/27/2021, at which time patient underwent cryotherapy for treatment of actinic keratosis.     Today, patient states there is a spot on her left cheek that was flaky and then developed into a spot, but is not sore. The patient is also concerned about two bumps on the forehead that don't go away which she uses moisturizer for.     Patient is otherwise feeling well, without additional skin concerns.    Labs Reviewed:  N/A    Physical Exam:  Vitals: LMP 07/13/2007   SKIN: Focused examination of the face and forehead was performed.  - On the left lateral cheek, there is pink gritty papule with adjacent waxy white stuck on papule.   - No other lesions of concern on areas examined.     Medications:  Current Outpatient Medications   Medication     Calcium Carbonate-Vitamin D (CALCIUM 500 + D PO)     hydroquinone (KAVIN) 4 % external cream     simvastatin (ZOCOR) 20 MG tablet     tretinoin (RETIN-A) 0.025 % external cream     zolpidem (AMBIEN) 10 MG tablet     No current facility-administered medications for this visit.      Past Medical History:   Patient Active Problem List   Diagnosis     Insomnia     Recurrent UTI     Hyperlipidemia LDL goal <160     Vaginal atrophy     Weight gain     Pain of finger of left hand     Past Medical History:   Diagnosis Date     Recurrent UTI 11/19/2009     Tobacco use disorder     quit in 2008        CC No referring provider defined for this encounter. on close of this encounter.

## 2022-12-14 NOTE — NURSING NOTE
Dermatology Rooming Note    Isela Cartwright's goals for this visit include:   Chief Complaint   Patient presents with     Derm Problem     Spot check on left cheek

## 2022-12-14 NOTE — PATIENT INSTRUCTIONS
Cryotherapy    What is it?  Use of a very cold liquid, such as liquid nitrogen, to freeze and destroy abnormal skin cells that need to be removed    What should I expect?  Tenderness and redness  A small blister that might grow and fill with dark purple blood. There may be crusting.  More than one treatment may be needed if the lesions do not go away.    How do I care for the treated area?  Gently wash the area with your hands when bathing.  Use a thin layer of Vaseline to help with healing. You may use a Band-Aid.   The area should heal within 7-10 days and may leave behind a pink or lighter color.   Do not use an antibiotic or Neosporin ointment.   You may take acetaminophen (Tylenol) for pain.     Call your doctor if you have:  Severe pain  Signs of infection (warmth, redness, cloudy yellow drainage, and or a bad smell)  Questions or concerns    Who should I call with questions?      [facility]: [phone]      [facility]: [phone]      For urgent needs outside of business hours call the [facility] at [phone] and ask for the dermatology resident on call

## 2022-12-14 NOTE — PROGRESS NOTES
[facility] Dermatology Note  Encounter Date: Dec 14, 2022  Office Visit     Dermatology Problem List:  1. Acne Vulgaris - face - tretinoin 0.025% cream nightly (refilled by PCP)  2. Family history of melanoma and NMSC (mother)  - last TBSE 10/27/2021   # Solar lentigines  - Hydroquinone 4% cream   3. AK s/p cryo   4. AK with adjacent SK s/p cryo    ____________________________________________    Assessment & Plan:    # Actinic keratosis with adjacent seborrheic keratosis, left lateral cheek  - Cryotherapy performed today, see procedure below.     Procedures Performed:   - Cryotherapy procedure note, location(s): Left lateral cheek. After verbal consent and discussion of risks and benefits including, but not limited to, dyspigmentation/scar, blister, and pain, 2 lesion(s) was(were) treated with 1-2 mm freeze border for 1-2 cycles with liquid nitrogen. Post cryotherapy instructions were provided.      Follow-up: 4 month(s) in-person, or earlier for new or changing lesions    Staff and Scribe:     Scribe Disclosure:  I, Hemant Newman, am serving as a scribe to document services personally performed by Nidhi Trevino PA-C based on data collection and the provider's statements to me.     Provider Disclosure:   The documentation recorded by the scribe accurately reflects the services I personally performed and the decisions made by me.    All risks, benefits and alternatives were discussed with patient.  Patient is in agreement and understands the assessment and plan.  All questions were answered.  Sun Screen Education was given.   Return to Clinic in 4 months or sooner as needed.   Nidhi Trevino PA-C   [facility]   ____________________________________________    CC: No chief complaint on file.    HPI:  Ms. Isela Cartwright is a(n) 57 year old female who presents today as a return patient for a spot check on the left cheek. Last seen by me on 10/27/2021, at which  time patient underwent cryotherapy for treatment of actinic keratosis.     Today, patient states there is a spot on her left cheek that was flaky and then developed into a spot, but is not sore. The patient is also concerned about two bumps on the forehead that don't go away which she uses moisturizer for.     Patient is otherwise feeling well, without additional skin concerns.    Labs Reviewed:  N/A    Physical Exam:  Vitals: LMP 07/13/2007   SKIN: Focused examination of the face and forehead was performed.  - On the left lateral cheek, there is pink gritty papule with adjacent waxy white stuck on papule.   - No other lesions of concern on areas examined.     Medications:  Current Outpatient Medications   Medication     Calcium Carbonate-Vitamin D (CALCIUM 500 + D PO)     hydroquinone (KAVIN) 4 % external cream     simvastatin (ZOCOR) 20 MG tablet     tretinoin (RETIN-A) 0.025 % external cream     zolpidem (AMBIEN) 10 MG tablet     No current facility-administered medications for this visit.      Past Medical History:   Patient Active Problem List   Diagnosis     Insomnia     Recurrent UTI     Hyperlipidemia LDL goal <160     Vaginal atrophy     Weight gain     Pain of finger of left hand     Past Medical History:   Diagnosis Date     Recurrent UTI 11/19/2009     Tobacco use disorder     quit in 2008        CC No referring provider defined for this encounter. on close of this encounter.

## 2022-12-27 ENCOUNTER — LAB (OUTPATIENT)
Dept: LAB | Facility: CLINIC | Age: 57
End: 2022-12-27
Payer: COMMERCIAL

## 2022-12-27 DIAGNOSIS — R79.89 ELEVATED TSH: ICD-10-CM

## 2022-12-27 LAB — TSH SERPL DL<=0.005 MIU/L-ACNC: 2.22 UIU/ML (ref 0.3–4.2)

## 2022-12-27 PROCEDURE — 36415 COLL VENOUS BLD VENIPUNCTURE: CPT

## 2022-12-27 PROCEDURE — 84443 ASSAY THYROID STIM HORMONE: CPT

## 2023-02-18 NOTE — NURSING NOTE
Nursing Progress Note     



Problem: Per 5150 pt. is here for SA by OD on street drugs after command hallucinations 
telling him to do so. Pt. states that he took "Fentanyl, black china, meth, 8 ball" in 
suicide attempt. Pt. states he feels hopeless. Pt. is also delusional, stating, "They won't 
give me my billion dollars". Pt. states, "I tried killing myself because Im a free renetta. 
They dont love me but I love them. Yes I do."



Interventions: Provided 1:1 assessment with therapeutic communication and active listening; 
Provided  medication administration/education/monitoring; Provided clear & simple 
instructions; Provide encouragement and education regarding performance of ADLs; Monitored 
behaviors & maintained clear boundaries; PRN Ativan given for anxiety. Monitored [id number] 
safety checks. 

 

Response:  Patient came out of his room right after shift change. He starts pacing right 
away, until breakfast comes. Patient wastes most of his meal, because I need to lose 
weight. Hes compliant with all medications, then starts pacing again. Patient paces on and 
off until lunch time. There is a new patient that is manic, and this patient made some mean 
remarks to the patient. It seemed like patient was going to lose control, but he was able to 
maintain by going to his room.





Plan: Pt is conserved and awaiting long term placement. One AMBIEN script given to patient.    Gerri Antunez CMA ([facility])

## 2023-03-24 ENCOUNTER — OFFICE VISIT (OUTPATIENT)
Dept: PODIATRY | Facility: CLINIC | Age: 58
End: 2023-03-24
Payer: COMMERCIAL

## 2023-03-24 VITALS
HEART RATE: 74 BPM | DIASTOLIC BLOOD PRESSURE: 78 MMHG | SYSTOLIC BLOOD PRESSURE: 122 MMHG | HEIGHT: 69 IN | BODY MASS INDEX: 23.04 KG/M2

## 2023-03-24 DIAGNOSIS — M72.2 PLANTAR FASCIAL FIBROMATOSIS: Primary | ICD-10-CM

## 2023-03-24 PROCEDURE — 99203 OFFICE O/P NEW LOW 30 MIN: CPT | Performed by: PODIATRIST

## 2023-03-24 RX ORDER — MELOXICAM 15 MG/1
15 TABLET ORAL DAILY
Qty: 30 TABLET | Refills: 0 | Status: SHIPPED | OUTPATIENT
Start: 2023-03-24 | End: 2023-04-20

## 2023-03-24 NOTE — PROGRESS NOTES
Subjective:    Patient is seen today as a new pt self referral with a 1 month(s) hx of right heel pain.  Points to the plantarmedial l tubercle.    Aggravated by activity and relieved by rest.  Has tried changing shoewear, OTC inserts, without much success.  Denies erythema, edema, ecchymosis, numbness, loss of strength.  Started doing a lot of walking and winter boots.  Wears slippers around the house.  On her feet at times at work.    ROS:  see above       Allergies   Allergen Reactions     Amoxicillin Rash     Bees      Sulfa Drugs Rash     Tetracycline      Mild itching; not sure if this is true allergy       Current Outpatient Medications   Medication Sig Dispense Refill     meloxicam (MOBIC) 15 MG tablet Take 1 tablet (15 mg) by mouth daily 30 tablet 0     Calcium Carbonate-Vitamin D (CALCIUM 500 + D PO) Take 1 tablet by mouth daily.       hydroquinone (KAVIN) 4 % external cream Use once a day to the affected areas on the right side of your face 30 g 1     simvastatin (ZOCOR) 20 MG tablet Take 1 tablet (20 mg) by mouth At Bedtime Needs an office appointment with labs for further refills 90 tablet 3     tretinoin (RETIN-A) 0.025 % external cream Use nightly on entire face 45 g 3     zolpidem (AMBIEN) 10 MG tablet Take 1 tablet (10 mg) by mouth nightly as needed for sleep 30 tablet 5       Patient Active Problem List   Diagnosis     Insomnia     Recurrent UTI     Hyperlipidemia LDL goal <160     Vaginal atrophy     Weight gain     Pain of finger of left hand       Past Medical History:   Diagnosis Date     Recurrent UTI 11/19/2009     Tobacco use disorder     quit in 2008       Past Surgical History:   Procedure Laterality Date     COLONOSCOPY  2013     GENITOURINARY SURGERY  1988    burned uretha, chronic urinary tract infections before     SURGICAL HISTORY OF -       urethral dilation from UTI       Family History   Problem Relation Age of Onset     Skin Cancer Mother      Melanoma Mother      Hypertension  "Father      Hyperlipidemia Father      Cancer - colorectal Maternal Grandmother      Cerebrovascular Disease Maternal Grandmother         TIAs     Skin Cancer Maternal Grandmother      Heart Disease Maternal Grandfather         cardiomegaly;  at 98     Breast Cancer Maternal Aunt         mom's half sister     Breast Cancer Cousin         paternal      Breast Cancer Cousin      JENNIFER. No family hx of         no deaths at young age, bypass, etc.     Diabetes No family hx of         father is borderline       Social History     Tobacco Use     Smoking status: Former     Packs/day: 0.50     Years: 15.00     Pack years: 7.50     Types: Cigarettes     Quit date: 2008     Years since quitting: 15.2     Smokeless tobacco: Never   Substance Use Topics     Alcohol use: Yes     Comment: OCCAS         Objective:    Vitals: /78   Pulse 74   Ht 1.753 m (5' 9\")   LMP 2007   BMI 23.04 kg/m    BMI: Body mass index is 23.04 kg/m .  Height: 5' 9\"    Constitutional/ general:  Pt is in no apparent distress, appears well-nourished.  Cooperative with history and physical exam.     Psych:  The patient answered questions appropriately.  Normal affect.  Seems to have reasonable expectations, in terms of treatment.     Lungs:  Non labored breathing, non labored speech. No cough.  No audible wheezing. Even, quiet breathing.       Vascular:  Pedal pulses are palpable bilaterally for both the DP and PT arteries.  CFT < 3 sec.  No edema.  Pedal hair growth noted.     Neuro:  Alert and oriented x 3. Coordinated gait.  Light touch sensation is intact     Derm: Normal texture and turgor.  No erythema, ecchymosis, or cyanosis.  No open lesions.     Musculoskeletal:    Lower extremity muscle strength is normal.  Patient is ambulatory without an assistive device or brace.  No gross deformities.   Cavus arch with weightbearing.   ROM is within normal limits.  Negative tinel's sign.  No side to side compression pain of the " calcaneus.  Pain upon palpation to the right plantar of the calcaneus.  No erythema, edema, ecchymosis, or subcutaneous masses noted.  No pain on palpation or stressing any tendons.  Negative Tinel's sign      Radiographic Exam:  X-Ray Findings:  I personally reviewed the films.  Normal    Assessment:  Plantar Fasciitis right foot     Plan: Discussed etiology and treatment options with the patient.  The potential causes and nature of plantar fasciitis were discussed with the patient.  We reviewed the natural history/prognosis of the condition and risks if left untreated.  These include chronic pain, other sites of pain due to gait changes, and potential plantar fascial rupture.      After thorough discussion and answering all questions, the patient elected to modifying activities, supportive shoes, ice, stretching, and not going barefoot.  Good house shoes at all times and I made recommendations.  Patient's work shoes over-year-old and will get better work shoe.  We dispensed heel cup.  We wrote a prescription for meloxicam.  We will stop if any GI distress and warned of this.   RTC in 4 weeks if not better otherwise prn.     Moy Gonzalez, SD, FACFAS

## 2023-03-24 NOTE — LETTER
3/24/2023         RE: Isela Cartwright  [address]  [place] [address]        Dear Colleague,    Thank you for referring your patient, Isela Cartwright, to the [facility]. Please see a copy of my visit note below.    Subjective:    Patient is seen today as a new pt self referral with a 1 month(s) hx of right heel pain.  Points to the plantarmedial l tubercle.    Aggravated by activity and relieved by rest.  Has tried changing shoewear, OTC inserts, without much success.  Denies erythema, edema, ecchymosis, numbness, loss of strength.  Started doing a lot of walking and winter boots.  Wears slippers around the house.  On her feet at times at work.    ROS:  see above       Allergies   Allergen Reactions     Amoxicillin Rash     Bees      Sulfa Drugs Rash     Tetracycline      Mild itching; not sure if this is true allergy       Current Outpatient Medications   Medication Sig Dispense Refill     meloxicam (MOBIC) 15 MG tablet Take 1 tablet (15 mg) by mouth daily 30 tablet 0     Calcium Carbonate-Vitamin D (CALCIUM 500 + D PO) Take 1 tablet by mouth daily.       hydroquinone (KAVIN) 4 % external cream Use once a day to the affected areas on the right side of your face 30 g 1     simvastatin (ZOCOR) 20 MG tablet Take 1 tablet (20 mg) by mouth At Bedtime Needs an office appointment with labs for further refills 90 tablet 3     tretinoin (RETIN-A) 0.025 % external cream Use nightly on entire face 45 g 3     zolpidem (AMBIEN) 10 MG tablet Take 1 tablet (10 mg) by mouth nightly as needed for sleep 30 tablet 5       Patient Active Problem List   Diagnosis     Insomnia     Recurrent UTI     Hyperlipidemia LDL goal <160     Vaginal atrophy     Weight gain     Pain of finger of left hand       Past Medical History:   Diagnosis Date     Recurrent UTI 11/19/2009     Tobacco use disorder     quit in 2008       Past Surgical History:   Procedure Laterality Date     COLONOSCOPY  2013      "GENITOURINARY SURGERY      burned uretha, chronic urinary tract infections before     SURGICAL HISTORY OF -       urethral dilation from UTI       Family History   Problem Relation Age of Onset     Skin Cancer Mother      Melanoma Mother      Hypertension Father      Hyperlipidemia Father      Cancer - colorectal Maternal Grandmother      Cerebrovascular Disease Maternal Grandmother         TIAs     Skin Cancer Maternal Grandmother      Heart Disease Maternal Grandfather         cardiomegaly;  at 98     Breast Cancer Maternal Aunt         mom's half sister     Breast Cancer Cousin         paternal      Breast Cancer Cousin      C.A.D. No family hx of         no deaths at young age, bypass, etc.     Diabetes No family hx of         father is borderline       Social History     Tobacco Use     Smoking status: Former     Packs/day: 0.50     Years: 15.00     Pack years: 7.50     Types: Cigarettes     Quit date: 2008     Years since quitting: 15.2     Smokeless tobacco: Never   Substance Use Topics     Alcohol use: Yes     Comment: OCCAS         Objective:    Vitals: /78   Pulse 74   Ht 1.753 m (5' 9\")   LMP 2007   BMI 23.04 kg/m    BMI: Body mass index is 23.04 kg/m .  Height: 5' 9\"    Constitutional/ general:  Pt is in no apparent distress, appears well-nourished.  Cooperative with history and physical exam.     Psych:  The patient answered questions appropriately.  Normal affect.  Seems to have reasonable expectations, in terms of treatment.     Lungs:  Non labored breathing, non labored speech. No cough.  No audible wheezing. Even, quiet breathing.       Vascular:  Pedal pulses are palpable bilaterally for both the DP and PT arteries.  CFT < 3 sec.  No edema.  Pedal hair growth noted.     Neuro:  Alert and oriented x 3. Coordinated gait.  Light touch sensation is intact     Derm: Normal texture and turgor.  No erythema, ecchymosis, or cyanosis.  No open lesions.     Musculoskeletal:    " Lower extremity muscle strength is normal.  Patient is ambulatory without an assistive device or brace.  No gross deformities.   Cavus arch with weightbearing.   ROM is within normal limits.  Negative tinel's sign.  No side to side compression pain of the calcaneus.  Pain upon palpation to the right plantar of the calcaneus.  No erythema, edema, ecchymosis, or subcutaneous masses noted.  No pain on palpation or stressing any tendons.  Negative Tinel's sign      Radiographic Exam:  X-Ray Findings:  I personally reviewed the films.  Normal    Assessment:  Plantar Fasciitis right foot     Plan: Discussed etiology and treatment options with the patient.  The potential causes and nature of plantar fasciitis were discussed with the patient.  We reviewed the natural history/prognosis of the condition and risks if left untreated.  These include chronic pain, other sites of pain due to gait changes, and potential plantar fascial rupture.      After thorough discussion and answering all questions, the patient elected to modifying activities, supportive shoes, ice, stretching, and not going barefoot.  Good house shoes at all times and I made recommendations.  Patient's work shoes over-year-old and will get better work shoe.  We dispensed heel cup.  We wrote a prescription for meloxicam.  We will stop if any GI distress and warned of this.   RTC in 4 weeks if not better otherwise prn.     Moy Gonzalez DPM, FACFAS          Again, thank you for allowing me to participate in the care of your patient.        Sincerely,        Moy Gonzalez DPM

## 2023-03-24 NOTE — PATIENT INSTRUCTIONS
We wish you continued good healing. If you have any questions or concerns, please do not hesitate to contact us at  [phone]    [employer] (secure e-mail communication and access to your chart) to send a message or to make an appointment.    Please remember to call and schedule a follow up appointment if one was recommended at your earliest convenience.     PODIATRY CLINIC HOURS  TELEPHONE NUMBER    Dr. Moy CARMICHAELPNAMITA [facility]        Clinics:  [facility]   [place]  Tuesday 1PM-6PM  [place]  Wednesday 745AM-330PM  [place]  Thursday/Friday 745AM-230PM       [place] APPOINTMENTS  [phone]    [place] APPOINTMENTS  [phone]        If you need a medication refill, please contact us you may need lab work and/or a follow up visit prior to your refill (i.e. Antifungal medications).  If MRI needed please call Imaging at [phone]   HOW DO I GET MY KNEE SCOOTER? Knee scooters can be picked up at ANY Medical Supply stores with your knee scooter Prescription.  OR  Bring your signed prescription to an [facility] showroom.

## 2023-03-26 ENCOUNTER — HEALTH MAINTENANCE LETTER (OUTPATIENT)
Age: 58
End: 2023-03-26

## 2023-04-10 ENCOUNTER — MYC MEDICAL ADVICE (OUTPATIENT)
Dept: PODIATRY | Facility: CLINIC | Age: 58
End: 2023-04-10
Payer: COMMERCIAL

## 2023-04-10 DIAGNOSIS — M72.2 PLANTAR FASCIAL FIBROMATOSIS: ICD-10-CM

## 2023-04-20 RX ORDER — MELOXICAM 15 MG/1
15 TABLET ORAL DAILY
Qty: 30 TABLET | Refills: 0 | Status: SHIPPED | OUTPATIENT
Start: 2023-04-20 | End: 2023-11-17

## 2023-04-24 ENCOUNTER — OFFICE VISIT (OUTPATIENT)
Dept: DERMATOLOGY | Facility: CLINIC | Age: 58
End: 2023-04-24
Payer: COMMERCIAL

## 2023-04-24 DIAGNOSIS — L82.0 INFLAMED SEBORRHEIC KERATOSIS: ICD-10-CM

## 2023-04-24 DIAGNOSIS — D23.9 DERMATOFIBROMA: ICD-10-CM

## 2023-04-24 DIAGNOSIS — L70.0 ACNE VULGARIS: ICD-10-CM

## 2023-04-24 DIAGNOSIS — L81.4 SOLAR LENTIGO: ICD-10-CM

## 2023-04-24 DIAGNOSIS — D18.01 CHERRY ANGIOMA: Primary | ICD-10-CM

## 2023-04-24 DIAGNOSIS — D22.9 MULTIPLE BENIGN NEVI: ICD-10-CM

## 2023-04-24 DIAGNOSIS — Z12.83 SKIN CANCER SCREENING: ICD-10-CM

## 2023-04-24 DIAGNOSIS — L82.1 SEBORRHEIC KERATOSIS: ICD-10-CM

## 2023-04-24 PROCEDURE — 17110 DESTRUCTION B9 LES UP TO 14: CPT | Performed by: PHYSICIAN ASSISTANT

## 2023-04-24 PROCEDURE — 99213 OFFICE O/P EST LOW 20 MIN: CPT | Mod: 25 | Performed by: PHYSICIAN ASSISTANT

## 2023-04-24 ASSESSMENT — PAIN SCALES - GENERAL: PAINLEVEL: NO PAIN (0)

## 2023-04-24 NOTE — LETTER
4/24/2023       RE: Isela Cartwright  [address]  [place] [address]     Dear Colleague,    Thank you for referring your patient, Isela Cartwright, to the [facility] [place] at [facility]. Please see a copy of my visit note below.    [facility] Dermatology Note  Encounter Date: Apr 24, 2023  Office Visit     Dermatology Problem List:  1. Acne Vulgaris   - tretinoin 0.025% cream (refilled by PCP)  2. Family history of melanoma and NMSC (mother)  - last TBSE 4/24/2023  3. Solar lentigines  - Hydroquinone 4% cream   4. AKs s/p cryo   5. ISKs, s/p cryo   ____________________________________________    Assessment & Plan:    # ISKs, central lower chest, central chest  - Cryotherapy performed today, see procedure below.     # Lesion to monitor, right upper abdomen   - Recheck at next skin check    # Acne vulgaris  - Continue tretinoin 0.025% cream. Advised to switch to every other night to manage recent dryness     # Multiple benign nevi  # Solar lentigines   - No concerning lesions today  - Monitor for ABCDEs of melanoma   - Continue sun protection - recommend SPF 30 or higher with frequent application   - Return sooner if noticing changing or symptomatic lesions    # Seborrheic keratoses  # Dermatofibromas   # Cherry angiomas  - Reassured patient of benign nature, no treatment necessary    Procedures Performed:   - Cryotherapy procedure note, location(s): see above. After verbal consent and discussion of risks and benefits including, but not limited to, dyspigmentation/scar, blister, and pain, 2 lesion(s) was(were) treated with 1-2 mm freeze border for 1-2 cycles with liquid nitrogen. Post cryotherapy instructions were provided.    Follow-up: 1 year(s) in-person, or earlier for new or changing lesions    Staff and Scribe:     Scribe Disclosure:  STUART MAC, am serving as a scribe to document services  personally performed by Nidhi Trevino PA-C based on data collection and the provider's statements to me.      Provider Disclosure:   The documentation recorded by the scribe accurately reflects the services I personally performed and the decisions made by me.    All risks, benefits and alternatives were discussed with patient.  Patient is in agreement and understands the assessment and plan.  All questions were answered.  Sun Screen Education was given.   Return to Clinic annually or sooner as needed.   Nidhi Trevino PA-C   [facility] Dermatology [facility]   ____________________________________________    CC: Derm Problem (FBSE. Has area of concern on her face dry spots that don't go away,  general mole check )    HPI:  Ms. Isela Cartwright is a(n) 57 year old female who presents today as a return patient for FBSE. Last seen by myself on 12/14/22, at which time the patient underwent cryo for treatment of an AK with adjacent SK.    The patient reports a tag like lesion that rubs against her bra. Additionally, she has some dry spots of concern on her face. Still using tretinoin but it will cause some dryness.    Patient is otherwise feeling well, without additional skin concerns.    Labs Reviewed:  N/A    Physical Exam:  Vitals: LMP 07/13/2007   SKIN: Full skin, excluding the groin, which includes the head/face, both arms, chest, back, abdomen,both legs, buttocks, digits and/or nails, was examined.  - Right upper abdomen, 7 mm brown uniform macule.  - There are dome shaped bright red papules on the trunk and extremities.   - Multiple regular brown pigmented macules and papules are identified on the trunk and extremities.   - Scattered brown macules on sun exposed areas.  - There are waxy stuck on tan to brown papules on the trunk and extremities.   - There is a firm tan/flesh colored papule that dimples with lateral pressure on the left posterior upper arm, right posterior shoulder.   - There are  tan to brown waxy stuck on papules with surrounding erythema on the central lower chest, central chest.   - No other lesions of concern on areas examined.     Medications:  Current Outpatient Medications   Medication    Calcium Carbonate-Vitamin D (CALCIUM 500 + D PO)    hydroquinone (KAVIN) 4 % external cream    meloxicam (MOBIC) 15 MG tablet    simvastatin (ZOCOR) 20 MG tablet    tretinoin (RETIN-A) 0.025 % external cream    zolpidem (AMBIEN) 10 MG tablet     No current facility-administered medications for this visit.      Past Medical History:   Patient Active Problem List   Diagnosis    Insomnia    Recurrent UTI    Hyperlipidemia LDL goal <160    Vaginal atrophy    Weight gain    Pain of finger of left hand     Past Medical History:   Diagnosis Date    Recurrent UTI 11/19/2009    Tobacco use disorder     quit in 2008        CC No referring provider defined for this encounter. on close of this encounter.

## 2023-04-24 NOTE — PROGRESS NOTES
[facility] Dermatology Note  Encounter Date: Apr 24, 2023  Office Visit     Dermatology Problem List:  1. Acne Vulgaris   - tretinoin 0.025% cream (refilled by PCP)  2. Family history of melanoma and NMSC (mother)  - last TBSE 4/24/2023  3. Solar lentigines  - Hydroquinone 4% cream   4. AKs s/p cryo   5. ISKs, s/p cryo   ____________________________________________    Assessment & Plan:    # ISKs, central lower chest, central chest  - Cryotherapy performed today, see procedure below.     # Lesion to monitor, right upper abdomen   - Recheck at next skin check    # Acne vulgaris  - Continue tretinoin 0.025% cream. Advised to switch to every other night to manage recent dryness     # Multiple benign nevi  # Solar lentigines   - No concerning lesions today  - Monitor for ABCDEs of melanoma   - Continue sun protection - recommend SPF 30 or higher with frequent application   - Return sooner if noticing changing or symptomatic lesions    # Seborrheic keratoses  # Dermatofibromas   # Cherry angiomas  - Reassured patient of benign nature, no treatment necessary    Procedures Performed:   - Cryotherapy procedure note, location(s): see above. After verbal consent and discussion of risks and benefits including, but not limited to, dyspigmentation/scar, blister, and pain, 2 lesion(s) was(were) treated with 1-2 mm freeze border for 1-2 cycles with liquid nitrogen. Post cryotherapy instructions were provided.    Follow-up: 1 year(s) in-person, or earlier for new or changing lesions    Staff and Scribe:     Scribe Disclosure:  I, STUART SILVER, am serving as a scribe to document services personally performed by Nidhi Trevino PA-C based on data collection and the provider's statements to me.      Provider Disclosure:   The documentation recorded by the scribe accurately reflects the services I personally performed and the decisions made by me.    All risks, benefits and alternatives were discussed with  patient.  Patient is in agreement and understands the assessment and plan.  All questions were answered.  Sun Screen Education was given.   Return to Clinic annually or sooner as needed.   Nidhi Trevino PA-C   [facility]   ____________________________________________    CC: Derm Problem (FBSE. Has area of concern on her face dry spots that don't go away,  general mole check )    HPI:  Ms. Isela Cartwright is a(n) 57 year old female who presents today as a return patient for FBSE. Last seen by myself on 12/14/22, at which time the patient underwent cryo for treatment of an AK with adjacent SK.    The patient reports a tag like lesion that rubs against her bra. Additionally, she has some dry spots of concern on her face. Still using tretinoin but it will cause some dryness.    Patient is otherwise feeling well, without additional skin concerns.    Labs Reviewed:  N/A    Physical Exam:  Vitals: LMP 07/13/2007   SKIN: Full skin, excluding the groin, which includes the head/face, both arms, chest, back, abdomen,both legs, buttocks, digits and/or nails, was examined.  - Right upper abdomen, 7 mm brown uniform macule.  - There are dome shaped bright red papules on the trunk and extremities.   - Multiple regular brown pigmented macules and papules are identified on the trunk and extremities.   - Scattered brown macules on sun exposed areas.  - There are waxy stuck on tan to brown papules on the trunk and extremities.   - There is a firm tan/flesh colored papule that dimples with lateral pressure on the left posterior upper arm, right posterior shoulder.   - There are tan to brown waxy stuck on papules with surrounding erythema on the central lower chest, central chest.   - No other lesions of concern on areas examined.     Medications:  Current Outpatient Medications   Medication     Calcium Carbonate-Vitamin D (CALCIUM 500 + D PO)     hydroquinone (KAVIN) 4 % external cream     meloxicam  (MOBIC) 15 MG tablet     simvastatin (ZOCOR) 20 MG tablet     tretinoin (RETIN-A) 0.025 % external cream     zolpidem (AMBIEN) 10 MG tablet     No current facility-administered medications for this visit.      Past Medical History:   Patient Active Problem List   Diagnosis     Insomnia     Recurrent UTI     Hyperlipidemia LDL goal <160     Vaginal atrophy     Weight gain     Pain of finger of left hand     Past Medical History:   Diagnosis Date     Recurrent UTI 11/19/2009     Tobacco use disorder     quit in 2008        CC No referring provider defined for this encounter. on close of this encounter.

## 2023-04-24 NOTE — PATIENT INSTRUCTIONS
Patient Education     Checking for Skin Cancer  You can find cancer early by checking your skin each month. There are 3 kinds of skin cancer. They are melanoma, basal cell carcinoma, and squamous cell carcinoma. Doing monthly skin checks is the best way to find new marks or skin changes. Follow the instructions below for checking your skin.   The ABCDEs of checking moles for melanoma   Check your moles or growths for signs of melanoma using ABCDE:   Asymmetry: the sides of the mole or growth don t match  Border: the edges are ragged, notched, or blurred  Color: the color within the mole or growth varies  Diameter: the mole or growth is larger than 6 mm (size of a pencil eraser)  Evolving: the size, shape, or color of the mole or growth is changing (evolving is not shown in the images below)    Checking for other types of skin cancer  Basal cell carcinoma or squamous cell carcinoma have symptoms such as:     A spot or mole that looks different from all other marks on your skin  Changes in how an area feels, such as itching, tenderness, or pain  Changes in the skin's surface, such as oozing, bleeding, or scaliness  A sore that does not heal  New swelling or redness beyond the border of a mole    Who s at risk?  Anyone can get skin cancer. But you are at greater risk if you have:   Fair skin, light-colored hair, or light-colored eyes  Many moles or abnormal moles on your skin  A history of sunburns from sunlight or tanning beds  A family history of skin cancer  A history of exposure to radiation or chemicals  A weakened immune system  If you have had skin cancer in the past, you are at risk for recurring skin cancer.   How to check your skin  Do your monthly skin checkups in front of a full-length mirror. Check all parts of your body, including your:   Head (ears, face, neck, and scalp)  Torso (front, back, and sides)  Arms (tops, undersides, upper, and lower armpits)  Hands (palms, backs, and fingers, including  under the nails)  Buttocks and genitals  Legs (front, back, and sides)  Feet (tops, soles, toes, including under the nails, and between toes)  If you have a lot of moles, take digital photos of them each month. Make sure to take photos both up close and from a distance. These can help you see if any moles change over time.   Most skin changes are not cancer. But if you see any changes in your skin, call your doctor right away. Only he or she can diagnose a problem. If you have skin cancer, seeing your doctor can be the first step toward getting the treatment that could save your life.   [employer] last reviewed this educational content on 4/1/2019 2000-2020 [employer]. [address] [place] [address], [place] [address]. All rights reserved. This information is not intended as a substitute for professional medical care. Always follow your healthcare professional's instructions.       When should I call my doctor?  If you are worsening or not improving, please, contact us or seek urgent care as noted below.     Who should I call with questions (adults)?  [facility] (adult and pediatric): [phone]  [facility] (adult): [phone]  For urgent needs outside of business hours call the [facility] at [phone] and ask for the dermatology resident on call to be paged  If this is a medical emergency and you are unable to reach an ER, Call [phone]    Who should I call with questions (pediatric)?  [facility]- Pediatric Dermatology  Dr. Melonie Jones, Dr. Colleen Brower, Dr. Cherelle Rosales, CHALINO Sahni, Dr. Martha Araya, Dr. Kristan Sharma & Dr. Steve Goss  Non-urgent nurse triage line; [phone]- Adriane and Sandra EID Care Coordinatorjann Gutierrez (/Complex ) [phone]    If you need a prescription refill, please contact your pharmacy. Refills are approved or denied by our  Physicians during normal business hours, Monday through Fridays  Per office policy, refills will not be granted if you have not been seen within the past year (or sooner depending on your child's condition)    Scheduling Information:  Pediatric Appointment Scheduling and Call Center [phone]  Radiology Scheduling- [phone]  Sedation Unit Scheduling- [phone]  [facility] Scheduling- General [phone]; Pediatric Dermatology [phone]  Main  Services: [phone]  Slovak: [phone]  Tur[place] and [place] Islander: [phone]  Hmong/Surinamese/German: [phone]  Preadmission Nursing Department Fax Number: [phone] (Fax all pre-operative paperwork to this number)    For urgent matters arising during evenings, weekends, or holidays that cannot wait for normal business hours please call [phone] and ask for the dermatology resident on call to be paged. Cryotherapy    What is it?  Use of a very cold liquid, such as liquid nitrogen, to freeze and destroy abnormal skin cells that need to be removed    What should I expect?  Tenderness and redness  A small blister that might grow and fill with dark purple blood. There may be crusting.  More than one treatment may be needed if the lesions do not go away.    How do I care for the treated area?  Gently wash the area with your hands when bathing.  Use a thin layer of Vaseline to help with healing. You may use a Band-Aid.   The area should heal within 7-10 days and may leave behind a pink or lighter color.   Do not use an antibiotic or Neosporin ointment.   You may take acetaminophen (Tylenol) for pain.     Call your doctor if you have:  Severe pain  Signs of infection (warmth, redness, cloudy yellow drainage, and or a bad smell)  Questions or concerns    Who should I call with questions?      [facility]: [phone]      [facility]: [phone]      For urgent needs outside of business hours  call the [facility] at [phone] and ask for the dermatology resident on call

## 2023-04-24 NOTE — NURSING NOTE
Dermatology Rooming Note    Isela Cartwright's goals for this visit include:   Chief Complaint   Patient presents with     Derm Problem     Has area of concern on her face dry spots that don't go away,  general mole check      Saloni Winter, Visit Facilitator

## 2023-04-26 ENCOUNTER — TELEPHONE (OUTPATIENT)
Dept: DERMATOLOGY | Facility: CLINIC | Age: 58
End: 2023-04-26
Payer: COMMERCIAL

## 2023-04-26 NOTE — TELEPHONE ENCOUNTER
Lvm. 1st attempt informing patient to call [phone] to schedule 1 year follow up with Nidhi Trevino  in Dermatology.

## 2023-05-17 ENCOUNTER — THERAPY VISIT (OUTPATIENT)
Dept: PHYSICAL THERAPY | Facility: CLINIC | Age: 58
End: 2023-05-17
Attending: PODIATRIST
Payer: COMMERCIAL

## 2023-05-17 DIAGNOSIS — M72.2 PLANTAR FASCIAL FIBROMATOSIS: ICD-10-CM

## 2023-05-17 DIAGNOSIS — M72.2 PLANTAR FASCIITIS: ICD-10-CM

## 2023-05-17 PROCEDURE — 97140 MANUAL THERAPY 1/> REGIONS: CPT | Mod: GP | Performed by: PHYSICAL THERAPIST

## 2023-05-17 PROCEDURE — 97161 PT EVAL LOW COMPLEX 20 MIN: CPT | Mod: GP | Performed by: PHYSICAL THERAPIST

## 2023-05-17 PROCEDURE — 97110 THERAPEUTIC EXERCISES: CPT | Mod: GP | Performed by: PHYSICAL THERAPIST

## 2023-05-17 NOTE — PROGRESS NOTES
PHYSICAL THERAPY EVALUATION  Type of Visit: Evaluation    See electronic medical record for Abuse and Falls Screening details.    Subjective      Presenting condition or subjective complaint: R plantar fasciitis  Date of onset:      Relevant medical history: Menopause; Migraines or headaches; Overweight; Smoking   Dates & types of surgery: urethral surgery    Prior diagnostic imaging/testing results:       Prior therapy history for the same diagnosis, illness or injury: No      Prior Level of Function   Transfers:   Ambulation:   ADL:   IADL:     Living Environment  Social support:     Type of home:     Stairs to enter the home: Yes       Ramp:     Stairs inside the home:         Help at home:    Equipment owned:       Employment: Yes   Hobbies/Interests: walking    Patient goals for therapy: normal walking    Pain assessment:      Objective   FOOT/ANKLE EVALUATION  PAIN: Pain Level at Rest: 3/10  INTEGUMENTARY (edema, incisions): WNL  POSTURE: WNL  GAIT:   Weightbearing Status: WBAT  Assistive Device(s): None  Gait Deviations: WNL  Balance/Proprioception: WNL  Weight Bearing Alignment: WNL  Non-Weight Bearing Alignment: WNL   ROM: AROM WNL    Strength: WNL  FLEXIBILITY: WNL  Special Tests: WNL  FUNCTIONAL TESTS:   PALPATION:   + Tenderness at Location Left Right   Gastroc/Soleus     Achilles Tendon     Anterior Tibialis     Posterior Tibialis     Incisional     Deltoid Ligament     Plantar Fascia  +   Navicular     Medial Calcaneal     Peroneals     Anterior Talofibular Ligament     Posterior Talofibular Ligament     Calcaneofibular Ligament     Medial Malleolus     Lateral Malleolus     Anterior Tibiofibular Ligament     Posterior Tibiofibular Ligament         JOINT MOBILITY: WNL    Assessment & Plan   CLINICAL IMPRESSIONS   Medical Diagnosis: R plantar fasciitis    Treatment Diagnosis: R plantar fasciitis   Impression/Assessment: Patient is a 57 year old female with R foot complaints.  The following  significant findings have been identified: Pain and Decreased ROM/flexibility. These impairments interfere with their ability to perform recreational activities, household chores, household mobility and community mobility as compared to previous level of function.     Clinical Decision Making (Complexity):   Clinical Presentation: Stable/Uncomplicated  Clinical Presentation Rationale: based on medical and personal factors listed in PT evaluation  Clinical Decision Making (Complexity): Low complexity    PLAN OF CARE  Treatment Interventions:  Interventions: Manual Therapy, Neuromuscular Re-education, Therapeutic Activity, Therapeutic Exercise    Long Term Goals     PT Goal 1  Goal Identifier: walking  Goal Description: pt able to walk x 30 min with 3/10 pain  Rationale: to maximize safety and independence with performance of ADLs and functional tasks;to maximize safety and independence within the home;to maximize safety and independence within the community;to maximize safety and independence with self cares  Target Date: 06/14/23  PT Goal 2  Goal Identifier: walking  Goal Description: pt able to walk x 1 hour with 1/10 pain  Rationale: to maximize safety and independence with performance of ADLs and functional tasks;to maximize safety and independence within the home;to maximize safety and independence within the community;to maximize safety and independence with self cares  Goal Progress: w+8      Frequency of Treatment: 1x/ week  Duration of Treatment: 8 weeks    Education Assessment:        Risks and benefits of evaluation/treatment have been explained.   Patient/Family/caregiver agrees with Plan of Care.     Evaluation Time:     PT Eval, Low Complexity Minutes (48406): 15      Signing Clinician: Bacilio Mcrae PT

## 2023-05-19 DIAGNOSIS — M72.2 PLANTAR FASCIAL FIBROMATOSIS: ICD-10-CM

## 2023-05-22 NOTE — TELEPHONE ENCOUNTER
[employer] message sent    Frida GUERRERO, Specialty RN 5/22/2023 11:15 AM

## 2023-05-23 RX ORDER — MELOXICAM 15 MG/1
TABLET ORAL
Qty: 30 TABLET | Refills: 0 | OUTPATIENT
Start: 2023-05-23

## 2023-06-20 ENCOUNTER — THERAPY VISIT (OUTPATIENT)
Dept: PHYSICAL THERAPY | Facility: CLINIC | Age: 58
End: 2023-06-20
Payer: COMMERCIAL

## 2023-06-20 DIAGNOSIS — M72.2 PLANTAR FASCIITIS: Primary | ICD-10-CM

## 2023-06-20 PROCEDURE — 97530 THERAPEUTIC ACTIVITIES: CPT | Mod: GP | Performed by: PHYSICAL THERAPIST

## 2023-06-20 PROCEDURE — 97110 THERAPEUTIC EXERCISES: CPT | Mod: GP | Performed by: PHYSICAL THERAPIST

## 2023-06-27 ENCOUNTER — HOSPITAL ENCOUNTER (OUTPATIENT)
Facility: AMBULATORY SURGERY CENTER | Age: 58
End: 2023-06-27
Attending: INTERNAL MEDICINE | Admitting: INTERNAL MEDICINE
Payer: COMMERCIAL

## 2023-06-27 ENCOUNTER — OFFICE VISIT (OUTPATIENT)
Dept: OBGYN | Facility: CLINIC | Age: 58
End: 2023-06-27
Payer: COMMERCIAL

## 2023-06-27 ENCOUNTER — TELEPHONE (OUTPATIENT)
Dept: GASTROENTEROLOGY | Facility: CLINIC | Age: 58
End: 2023-06-27

## 2023-06-27 VITALS
BODY MASS INDEX: 23.39 KG/M2 | SYSTOLIC BLOOD PRESSURE: 109 MMHG | HEART RATE: 93 BPM | WEIGHT: 158.4 LBS | DIASTOLIC BLOOD PRESSURE: 76 MMHG | TEMPERATURE: 98.1 F

## 2023-06-27 DIAGNOSIS — Z12.11 SPECIAL SCREENING FOR MALIGNANT NEOPLASMS, COLON: ICD-10-CM

## 2023-06-27 DIAGNOSIS — N95.2 VAGINAL ATROPHY: ICD-10-CM

## 2023-06-27 DIAGNOSIS — R94.6 BORDERLINE ABNORMAL THYROID FUNCTION TEST: ICD-10-CM

## 2023-06-27 DIAGNOSIS — Z00.00 ROUTINE GENERAL MEDICAL EXAMINATION AT A HEALTH CARE FACILITY: Primary | ICD-10-CM

## 2023-06-27 PROCEDURE — 99396 PREV VISIT EST AGE 40-64: CPT | Performed by: OBSTETRICS & GYNECOLOGY

## 2023-06-27 PROCEDURE — 84443 ASSAY THYROID STIM HORMONE: CPT | Performed by: OBSTETRICS & GYNECOLOGY

## 2023-06-27 PROCEDURE — 86376 MICROSOMAL ANTIBODY EACH: CPT | Performed by: OBSTETRICS & GYNECOLOGY

## 2023-06-27 PROCEDURE — 84439 ASSAY OF FREE THYROXINE: CPT | Performed by: OBSTETRICS & GYNECOLOGY

## 2023-06-27 PROCEDURE — 36415 COLL VENOUS BLD VENIPUNCTURE: CPT | Performed by: OBSTETRICS & GYNECOLOGY

## 2023-06-27 PROCEDURE — 86800 THYROGLOBULIN ANTIBODY: CPT | Performed by: OBSTETRICS & GYNECOLOGY

## 2023-06-27 ASSESSMENT — ANXIETY QUESTIONNAIRES
7. FEELING AFRAID AS IF SOMETHING AWFUL MIGHT HAPPEN: NOT AT ALL
3. WORRYING TOO MUCH ABOUT DIFFERENT THINGS: NOT AT ALL
GAD7 TOTAL SCORE: 0
1. FEELING NERVOUS, ANXIOUS, OR ON EDGE: NOT AT ALL
6. BECOMING EASILY ANNOYED OR IRRITABLE: NOT AT ALL
5. BEING SO RESTLESS THAT IT IS HARD TO SIT STILL: NOT AT ALL
2. NOT BEING ABLE TO STOP OR CONTROL WORRYING: NOT AT ALL
GAD7 TOTAL SCORE: 0
IF YOU CHECKED OFF ANY PROBLEMS ON THIS QUESTIONNAIRE, HOW DIFFICULT HAVE THESE PROBLEMS MADE IT FOR YOU TO DO YOUR WORK, TAKE CARE OF THINGS AT HOME, OR GET ALONG WITH OTHER PEOPLE: NOT DIFFICULT AT ALL

## 2023-06-27 ASSESSMENT — PATIENT HEALTH QUESTIONNAIRE - PHQ9
5. POOR APPETITE OR OVEREATING: NOT AT ALL
SUM OF ALL RESPONSES TO PHQ QUESTIONS 1-9: 0

## 2023-06-27 NOTE — TELEPHONE ENCOUNTER
"Endoscopy Scheduling Screen    Have you had a positive Covid test in the last 14 days?  No    Are you active on MyChart?   Yes    What insurance is in the chart?  Other:  medica    Ordering/Referring Provider: Elizabeth Wall   (If ordering provider performs procedure, schedule with ordering provider unless otherwise instructed. )    BMI: Estimated body mass index is 23.39 kg/m  as calculated from the following:    Height as of 3/24/23: 1.753 m (5' 9\").    Weight as of an earlier encounter on 6/27/23: 71.8 kg (158 lb 6.4 oz).     Sedation Ordered  moderate sedation.   If patient BMI > 50 do not schedule in ASC.    Are you taking any prescription medications for pain?   No    Are you taking methadone or Suboxone?  No    Do you have a history of malignant hyperthermia or adverse reaction to anesthesia?  No    (Females) Are you currently pregnant?   No     Have you been diagnosed or told you have pulmonary hypertension?   No    Do you have an LVAD?  No    Have you been told you have moderate to severe sleep apnea?  No    Have you been told you have COPD, asthma, or any other lung disease?  No    Do you have any heart conditions?  No     Have you ever had or are you awaiting a heart or lung transplant?   No    Have you had a stroke or transient ischemic attack (TIA aka \"mini stroke\" in the last 6 months?   No    Have you been diagnosed with or been told you have cirrhosis of the liver?   No    Are you currently on dialysis?   No    Do you need assistance transferring?   No    BMI: Estimated body mass index is 23.39 kg/m  as calculated from the following:    Height as of 3/24/23: 1.753 m (5' 9\").    Weight as of an earlier encounter on 6/27/23: 71.8 kg (158 lb 6.4 oz).     Is patients BMI > 40 and scheduling location [facility]?  No    Do you take the medication Phentermine, Ozempic or Wegovy?  No    Do you take the medication Naltrexone?  No    Do you take blood thinners?  No      Prep   Are you currently on dialysis or do " you have chronic kidney disease?  No    Do you have a diagnosis of diabetes?  No    Do you have a diagnosis of cystic fibrosis (CF)?  No    On a regular basis do you go 3 -5 days between bowel movements?  No    BMI > 40?  No    Preferred Pharmacy:    [facility] [id number] - [place] - [address]  [address]  [place] [address]  Phone: [phone] Fax: [phone]      Final Scheduling Details   Colonoscopy prep sent?  MiraLAX (No Mag Citrate)    Procedure scheduled  Colonoscopy    Surgeon:  Leventhal     Date of procedure:  9/20/23     Schedule PAC:   No    Location  [facility] - [facility]    Sedation   Moderate Sedation    Patient Reminders:    You will receive a call from a Nurse to review instructions and health history.  This assessment must be completed prior to your procedure.  Failure to complete the Nurse assessment may result in the procedure being cancelled.       On the day of your procedure, please designate an adult(s) who can drive you home stay with you for the next 24 hours. The medicines used in the exam will make you sleepy. You will not be able to drive.       You cannot take public transportation, ride share services, or non-medical taxi service without a responsible caregiver.  Medical transport services are allowed with the requirement that a responsible caregiver will receive you at your destination.  We require that drivers and caregivers are confirmed prior to your procedure.
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
## 2023-06-28 ENCOUNTER — TELEPHONE (OUTPATIENT)
Dept: GASTROENTEROLOGY | Facility: CLINIC | Age: 58
End: 2023-06-28
Payer: COMMERCIAL

## 2023-06-28 LAB
T4 FREE SERPL-MCNC: 1.12 NG/DL (ref 0.9–1.7)
THYROGLOB AB SERPL IA-ACNC: <20 IU/ML
THYROPEROXIDASE AB SERPL-ACNC: <10 IU/ML
TSH SERPL DL<=0.005 MIU/L-ACNC: 2.83 UIU/ML (ref 0.3–4.2)

## 2023-06-28 NOTE — TELEPHONE ENCOUNTER
Caller: Isela Cartwright   Reason for Reschedule/Cancellation (please be detailed, any staff messages or encounters to note?):     PT WANTED LATER START TIME       Prior to reschedule please review:    Ordering Provider:Elizabeth Wall MD    Sedation per order:MODERATE     Does patient have any ASC Exclusions, please identify?: N       Notes on Cancelled Procedure:  1. Procedure:Lower Endoscopy [Colonoscopy]   2. Date: 09/20/203  3. Location:[facility]; [address] [place] [address], [address] [place] [address]  4. Surgeon: LEVENTHAL         Rescheduled: YES (SAME DAY/LATER TIME)     Procedure: Lower Endoscopy [Colonoscopy]    Date: 09/20/203    Location:[facility]; [address] [place] [address], [address] [place] [address]    Surgeon: ORESTES     Sedation Level Scheduled  MODERATE          Reason for Sedation Level PER ORDER     Prep/Instructions updated and sent: Motista        Send In - basket message to Panc - Bryson Pool if EUS  procedure is canceled or rescheduled: [ N/A, YES or NO] N/A               Physical Therapy Visit    Referred by: Felix Varela DO; Medical Diagnosis (from order):    Diagnosis Information      Diagnosis    715.96 (ICD-9-CM) - M17.11 (ICD-10-CM) - Osteoarthritis of right knee              Visit: 5    Visit Type: Daily Treatment Note    SUBJECTIVE                                                                                                               Patient reports right knee pain is worse at night.  States he can tell he is better from one week ago.  Pain / Symptoms:  Pain rating (out of 10): Current: 3     OBJECTIVE                                                                                                                     Range of Motion (ROM)   (degrees unless noted; active unless noted; norms in ( ); negative=lacking to 0, positive=beyond 0)   Knee:    - Flexion (150):        • Right: 90 Passive: 93    - Extension (0-10):        • Right: -5 Passive: -3      TREATMENT                                                                                                                  Therapeutic Exercise:  Supine Ankle Pumps with half foam under heel - 1 set times 15 reps  · Supine Quadricep Sets with half foam under heel -1 set times 15 reps  · Supine assisted Heel Slide with Strap - 1 set times 15 reps  Seated Hamstring Stretch with Chair - 30 seconds times 2 reps    Modified ham stretch 2 minutes needing to take frequent rests   gastroc stretch over half foam 30 seconds times 2 reps    Stationary bike     Seat  8   Level 1    5 minute rocking back and forth and able to go backward last 3 minutes      Manual Therapy:  Increase soft tissue restriction noted right quads and hams. Decrease patellar glides especially inferior glide.    Patient received soft tissue mobilization in long sitting with decrease tone and tenderness noted afterwards.    Stretch to quads and hams to patient tolerance. Patient received patellar mobilization to toleance      Neuromuscular Re-Education:  Only  1:1 skilled therapy time billed for all of today's interventions, additional time spent with physical therapy extender recorded under additional time.     Russian stimulation to vastus medialis and rectus femoris cocontraction from a short arc quad position   10 on/10 off  Ramp 2   10 minutes     · Supine assisted Short Arc Quad - 2 set times 10 reps-able to lift to -20 degrees on his own today  · Supine assisted Straight Leg Raise -2 set times 10 reps  · Seated Knee Flexion Slide - 1 set times 15 reps  · Seated assisted Long Arc Quad - 1 set times 30 reps    Heel Raises over half foam -2 set times 10 reps  · Toe Raises over half foam -2 set times 10 reps  · standing ham curls - 2 set times 10 reps  Both sides  · Mini Squat with Counter Support 2 set times 10 reps    6 inch Toe taps times 20 reps  Tandem stance 30 seconds times 2 reps both sides    Patient needs cueing for proper positioning and muscle engagement to perform correctly.      Gait Training:  Physical therapist instruct and patient performed cane ambulation 50 feet times 1 rep and 10 feet times 1 rep stand by assist and verbal cues for pattern and posture. Right knee flexion in stance phase and decrease knee flexion at toe off. No buckling on right knee noted.  Physical therapist instruct can practice cane ambulation in the kitchen short distances in the home. Patient to use rolling walker with pain and fatigue.     Skilled input: verbal instruction/cues and tactile instruction/cues    Writer verbally educated and received verbal consent for hand placement, positioning of patient, and techniques to be performed today from patient for clothing adjustments for techniques, hand placement and palpation for techniques and therapist position for techniques as described above and how they are pertinent to the patient's plan of care.    Home Exercise Program/Education Materials: Access Code: [email or web address]  URL: [email or web address]  Date:  10/05/2022  Prepared by: Evelin Taylor    Exercises  · Supine Ankle Pumps - 3 x daily - 7 x weekly - 3 sets - 10 reps  · Supine Quadricep Sets - 3 x daily - 7 x weekly - 3 sets - 10 reps  · Supine Heel Slide with Strap - 3 x daily - 7 x weekly - 3 sets - 10 reps  · Supine Short Arc Quad - 3 x daily - 7 x weekly - 3 sets - 10 reps  · Supine Active Straight Leg Raise - 3 x daily - 7 x weekly - 3 sets - 10 reps  · Seated Knee Flexion Slide - 3 x daily - 7 x weekly - 3 sets - 10 reps  · Seated Long Arc Quad - 3 x daily - 7 x weekly - 3 sets - 10 reps  · Seated Hamstring Stretch with Chair - 3 x daily - 7 x weekly - 3 sets - 3-5 reps - 15-30 seconds hold  · Heel Raises with Counter Support - 3 x daily - 7 x weekly - 3 sets - 10 reps  · Toe Raises with Counter Support - 3 x daily - 7 x weekly - 3 sets - 10 reps  · standing ham curls - 3 x daily - 7 x weekly - 3 sets - 10 reps  · Mini Squat with Counter Support - 3 x daily - 7 x weekly - 3 sets - 10 reps  · Seated Table Hamstring Stretch - 3 x daily - 7 x weekly - 3 reps - 30 seconds hold             Cryotherapy (29167): Cold Pack  Location: right knee  Position: long sitting  Duration: 10 minutes  Results: decreased pain and decreased swelling  Reaction: no adverse reaction to treatment      ASSESSMENT                                                                                                             Increase flexion range of motion noted this visit. Still lacks full extension. Patient quads are stronger.  Patient able to progress exercises with no increase in pain. Patient responds well to soft tissue mobilization with decrease restrictions noted afterwards.  Plan to continue soft tissue and joint mobilization, therapeutic exercises, therapeutic activities and neuromuscular reeducation working towards patient goal of squatting, walking level and stairs with greater ease.     Patient Education:   Results of above outlined education: Verbalizes understanding and  Demonstrates understanding      PLAN                                                                                                                           Suggestions for next session as indicated: Progress per plan of care         Therapy procedure time and total treatment time can be found documented on the Time Entry flowsheet

## 2023-07-11 ENCOUNTER — PATIENT OUTREACH (OUTPATIENT)
Dept: CARE COORDINATION | Facility: CLINIC | Age: 58
End: 2023-07-11
Payer: COMMERCIAL

## 2023-08-08 ENCOUNTER — PATIENT OUTREACH (OUTPATIENT)
Dept: CARE COORDINATION | Facility: CLINIC | Age: 58
End: 2023-08-08
Payer: COMMERCIAL

## 2023-08-14 ENCOUNTER — ANCILLARY PROCEDURE (OUTPATIENT)
Dept: MAMMOGRAPHY | Facility: CLINIC | Age: 58
End: 2023-08-14
Attending: STUDENT IN AN ORGANIZED HEALTH CARE EDUCATION/TRAINING PROGRAM
Payer: COMMERCIAL

## 2023-08-14 DIAGNOSIS — Z12.31 VISIT FOR SCREENING MAMMOGRAM: ICD-10-CM

## 2023-08-14 PROCEDURE — 77063 BREAST TOMOSYNTHESIS BI: CPT | Mod: GC | Performed by: STUDENT IN AN ORGANIZED HEALTH CARE EDUCATION/TRAINING PROGRAM

## 2023-08-14 PROCEDURE — 77067 SCR MAMMO BI INCL CAD: CPT | Mod: GC | Performed by: STUDENT IN AN ORGANIZED HEALTH CARE EDUCATION/TRAINING PROGRAM

## 2023-08-21 ENCOUNTER — THERAPY VISIT (OUTPATIENT)
Dept: PHYSICAL THERAPY | Facility: CLINIC | Age: 58
End: 2023-08-21
Payer: COMMERCIAL

## 2023-08-21 DIAGNOSIS — M72.2 PLANTAR FASCIITIS: Primary | ICD-10-CM

## 2023-08-21 PROCEDURE — 97110 THERAPEUTIC EXERCISES: CPT | Mod: 59 | Performed by: PHYSICAL THERAPIST

## 2023-08-21 PROCEDURE — 97530 THERAPEUTIC ACTIVITIES: CPT | Mod: GP | Performed by: PHYSICAL THERAPIST

## 2023-08-25 ENCOUNTER — TELEPHONE (OUTPATIENT)
Dept: GASTROENTEROLOGY | Facility: CLINIC | Age: 58
End: 2023-08-25
Payer: COMMERCIAL

## 2023-08-25 NOTE — TELEPHONE ENCOUNTER
Caller: Isela  Reason for Reschedule/Cancellation (please be detailed, any staff messages or encounters to note?): Provider not available      Prior to reschedule please review:  Ordering Provider: Mae  Sedation per order: CS  Does patient have any ASC Exclusions, please identify?: N      Notes on Cancelled Procedure:  Procedure: Lower Endoscopy [Colonoscopy]   Date: 9/20/23  Location: [facility]; [address] [place] [address], [address] [place] [id number]  Surgeon: Trinity      Rescheduled: No-Left VM and sent MC    Case in depot       Send In - basket message to Panc - Bryson Pool if EUS  procedure is canceled or rescheduled: [ N/A, YES or NO] NA

## 2023-08-25 NOTE — TELEPHONE ENCOUNTER
Caller: Isela Cartwright   Reason for Reschedule/Cancellation (please be detailed, any staff messages or encounters to note?):     PROVIDER OOO       Rescheduled: YES   Procedure: Lower Endoscopy [Colonoscopy]  Date: 11/29/2023  Location:[facility]; [address] [place] [address], [address] Floor, [place] [address]  Surgeon: ORESTES   Sedation Level Scheduled  MODERATE          Reason for Sedation Level PER ORDER   Prep/Instructions updated and sent: [employer]ible        Send In - basket message to Panc - Bryson Pool if EUS procedure is canceled or rescheduled: [ N/A, YES or NO] N/A

## 2023-09-03 NOTE — PROGRESS NOTES
08/21/23 0500   Appointment Info   Signing clinician's name / credentials OWEN Campbell PT, Mary MDT   Total/Authorized Visits 8   Visits Used 3   Medical Diagnosis R plantar fasciitis   PT Tx Diagnosis R plantar fasciitis   Progress Note/Certification   Onset of illness/injury or Date of Surgery   (Mar/Apr 2023 - wearing snow boots for prolonged period)   Therapy Frequency 1x/ week   Predicted Duration 8 weeks   Progress Note Completed Date 08/21/23   GOALS   PT Goals 2   PT Goal 1   Goal Identifier walking   Goal Description pt able to walk x 30 min with 3/10 pain   Rationale to maximize safety and independence with performance of ADLs and functional tasks;to maximize safety and independence within the home;to maximize safety and independence within the community;to maximize safety and independence with self cares   Goal Progress walking 30 min pain 1/10   Target Date 08/15/23   Date Met 08/21/23   PT Goal 2   Goal Identifier walking   Goal Description pt able to walk x 1 hour with 1/10 pain   Rationale to maximize safety and independence with performance of ADLs and functional tasks;to maximize safety and independence within the home;to maximize safety and independence within the community;to maximize safety and independence with self cares   Goal Progress 3 mile walk w/ pain 2/10 - ankle pain, not heel   Target Date 10/16/23   Subjective Report   Subjective Report R heel pain has resolved, only occas mild pain.  Chief C/o ankle pain Med/lat ankle and dorsum of R foot.  FISit 1 x/day, stretch on step 2 x/day.  Has returned to normal walking - 1 1/2 miles 2 x/week and 3 mile walk 1 x/week.  [place] walk ankle and dorsum of foot hurt and longer walk the ankle is more sore (2/10).   Objective Measures   Objective Measures Objective Measure 1;Objective Measure 2;Objective Measure 3   Objective Measure 1   Objective Measure ROM   Details Ankle AROM WNL R.  PROM R - Slight decr Passive DF.  Lumbar AROM WNL all movts  except slight decr ext.   Objective Measure 2   Objective Measure Strength   Details MMT: Bilat  AT 5/5, Post Tib 5/5, Extensor dig 5/5, and peroneals 4+/5.   Myotomes WNL.  Bilat Toe Raise x10 - painfree.  Single leg Toe RAise x10 - Equal strength,  Reverse Toe Raise x20 - slight fatigue.   Objective Measure 3   Objective Measure TTP   Details Center R plantar heel.  Slump test (-)   Treatment Interventions (PT)   Interventions Therapeutic Procedure/Exercise;Therapeutic Activity   Therapeutic Procedure/Exercise   Therapeutic Procedures: strength, endurance, ROM, flexibillity minutes (85560) 25   Ther Proc 1 - Details Reassessment, Review of exer, Progression of exer, and Reassessment between sets of exer.   PTRx Ther Proc 1 Flexion in Sitting with Patient Overpressure/Progression to Knee Extension   PTRx Ther Proc 1 - Details FIS w/ OP (pull on heels) - NE NE NE ankle aching   PTRx Ther Proc 2 Repeated Ankle Dorsiflexion with Overpressure   PTRx Ther Proc 2 - Details 2 x15 NE NE decr ankle aching   PTRx Ther Proc 3 Toe Raises   PTRx Ther Proc 3 - Details Toes in/Toes Out/Neutral x15 - cues for full toe raise and slow and controlled slight pain w/ Toes in R ankle - decr w/ belt stretch after   Skilled Intervention Review of current exer and instr in new exer   Patient Response/Progress Decr Heel pain w/ stdg   Therapeutic Activity   Therapeutic Activities: dynamic activities to improve functional performance minutes (31810) 8   Ther Act 1 Pt Ed:   Ther Act 1 - Details Stressed need to continue stretching spine as a possible source of foot pain while continuing to treat foot/ankle, and HEP instr.   PTRx updated.  Pt demonstrated understanding of HEP   Skilled Intervention Education   Education   Learner/Method Patient;Pictures/Video   Plan   Home program see PTRX   Updates to plan of care toe raise strengthening   Plan for next session review exer and progress as tolerated   Total Session Time   Timed Code Treatment  Minutes 33   Total Treatment Time (sum of timed and untimed services) 33         PLAN  Continue therapy per current plan of care.    Beginning/End Dates of Progress Note Reporting Period:  05/17/2023 to 08/21/2023    Referring Provider:  Moy Gonzalez

## 2023-09-06 ENCOUNTER — TELEPHONE (OUTPATIENT)
Dept: FAMILY MEDICINE | Facility: CLINIC | Age: 58
End: 2023-09-06
Payer: COMMERCIAL

## 2023-09-06 NOTE — TELEPHONE ENCOUNTER
Shell, from Imaging scheduling calling.    The patient is trying to schedule yearly CT chest lung cancer screening    RN helped schedule appt    Lakeisha Pineda RN

## 2023-09-13 ENCOUNTER — MYC MEDICAL ADVICE (OUTPATIENT)
Dept: FAMILY MEDICINE | Facility: CLINIC | Age: 58
End: 2023-09-13
Payer: COMMERCIAL

## 2023-09-13 DIAGNOSIS — G47.00 INSOMNIA, UNSPECIFIED TYPE: ICD-10-CM

## 2023-09-13 RX ORDER — ZOLPIDEM TARTRATE 10 MG/1
10 TABLET ORAL
Qty: 30 TABLET | Refills: 0 | Status: SHIPPED | OUTPATIENT
Start: 2023-09-13

## 2023-10-04 DIAGNOSIS — E78.5 HYPERLIPIDEMIA LDL GOAL <130: ICD-10-CM

## 2023-10-04 RX ORDER — SIMVASTATIN 20 MG
20 TABLET ORAL AT BEDTIME
Qty: 90 TABLET | Refills: 0 | Status: SHIPPED | OUTPATIENT
Start: 2023-10-04 | End: 2023-11-17

## 2023-11-13 ENCOUNTER — TELEPHONE (OUTPATIENT)
Dept: GASTROENTEROLOGY | Facility: CLINIC | Age: 58
End: 2023-11-13

## 2023-11-13 NOTE — TELEPHONE ENCOUNTER
Attempted to contact patient in order to complete pre assessment questions.     No answer. Left message to return call to [phone] option 4      Procedure details:    Patient scheduled for Colonoscopy  on 11/29/23.     Arrival time: 0830. Procedure time 0930    Pre op exam needed? N/A    [facility]; [address] [place] [address], [address]th Floor, [place] [address]    Sedation type: Conscious sedation     Indication for procedure: Screening      Chart review:     Electronic implanted devices? No    Recent diagnosis of diverticulitis within the last 6 weeks? No    Diabetic? No    Diabetic medication HOLDING recommendations: (if applicable)  Oral diabetic medications: No  Diabetic injectables: No  Insulin: No      Medication review:    Anticoagulants? No    NSAIDS? Yes.  Meloxicam (Mobic).  Holding interval of 10 days.    Other medication HOLDING recommendations:  N/A      Prep for procedure:     Bowel prep recommendation: Miralax without magnesium citrate  Due to: standard prep due to recall.    Prep instructions sent via [employer].     Olive Yanes RN  Endoscopy Procedure Pre Assessment RN

## 2023-11-15 NOTE — TELEPHONE ENCOUNTER
Pre assessment completed for upcoming procedure.   (Please see previous telephone encounter notes for complete details)    Patient  returned call.       Procedure details:    Arrival time and facility location reviewed.    Pre op exam needed? N/A    Designated  policy reviewed. Instructed to have someone stay 6 hours post procedure.     COVID policy reviewed.      Medication review:    Medications reviewed. Please see supporting documentation below. Holding recommendations discussed (if applicable).   N/A-reported not taking Meloxicam       Prep for procedure:     Procedure prep instructions reviewed.        Additional information needed?  N/A      Patient  verbalized understanding and had no questions or concerns at this time.      Olive Hayes RN  Endoscopy Procedure Pre Assessment RN  [phone] option 4

## 2023-11-17 ENCOUNTER — OFFICE VISIT (OUTPATIENT)
Dept: FAMILY MEDICINE | Facility: CLINIC | Age: 58
End: 2023-11-17
Payer: COMMERCIAL

## 2023-11-17 VITALS
BODY MASS INDEX: 23.78 KG/M2 | TEMPERATURE: 98 F | DIASTOLIC BLOOD PRESSURE: 80 MMHG | WEIGHT: 161 LBS | SYSTOLIC BLOOD PRESSURE: 118 MMHG | HEART RATE: 80 BPM | OXYGEN SATURATION: 98 %

## 2023-11-17 DIAGNOSIS — G47.00 INSOMNIA, UNSPECIFIED TYPE: ICD-10-CM

## 2023-11-17 DIAGNOSIS — Z87.891 PERSONAL HISTORY OF TOBACCO USE: ICD-10-CM

## 2023-11-17 DIAGNOSIS — Z13.1 SCREENING FOR DIABETES MELLITUS: ICD-10-CM

## 2023-11-17 DIAGNOSIS — R79.89 ELEVATED TSH: ICD-10-CM

## 2023-11-17 DIAGNOSIS — E78.5 HYPERLIPIDEMIA LDL GOAL <130: Primary | ICD-10-CM

## 2023-11-17 LAB
ALBUMIN SERPL BCG-MCNC: 4.6 G/DL (ref 3.5–5.2)
ALP SERPL-CCNC: 79 U/L (ref 40–150)
ALT SERPL W P-5'-P-CCNC: 26 U/L (ref 0–50)
ANION GAP SERPL CALCULATED.3IONS-SCNC: 12 MMOL/L (ref 7–15)
AST SERPL W P-5'-P-CCNC: 25 U/L (ref 0–45)
BILIRUB SERPL-MCNC: 0.8 MG/DL
BUN SERPL-MCNC: 13.1 MG/DL (ref 8–23)
CALCIUM SERPL-MCNC: 9.7 MG/DL (ref 8.8–10.2)
CHLORIDE SERPL-SCNC: 105 MMOL/L (ref 98–107)
CHOLEST SERPL-MCNC: 229 MG/DL
CREAT SERPL-MCNC: 0.76 MG/DL (ref 0.51–0.95)
DEPRECATED HCO3 PLAS-SCNC: 25 MMOL/L (ref 22–29)
EGFRCR SERPLBLD CKD-EPI 2021: 89 ML/MIN/1.73M2
GLUCOSE SERPL-MCNC: 98 MG/DL (ref 70–99)
HDLC SERPL-MCNC: 71 MG/DL
LDLC SERPL CALC-MCNC: 141 MG/DL
NONHDLC SERPL-MCNC: 158 MG/DL
POTASSIUM SERPL-SCNC: 4.5 MMOL/L (ref 3.4–5.3)
PROT SERPL-MCNC: 6.9 G/DL (ref 6.4–8.3)
SODIUM SERPL-SCNC: 142 MMOL/L (ref 135–145)
TRIGL SERPL-MCNC: 86 MG/DL
TSH SERPL DL<=0.005 MIU/L-ACNC: 2.71 UIU/ML (ref 0.3–4.2)

## 2023-11-17 PROCEDURE — 84443 ASSAY THYROID STIM HORMONE: CPT | Performed by: STUDENT IN AN ORGANIZED HEALTH CARE EDUCATION/TRAINING PROGRAM

## 2023-11-17 PROCEDURE — 99214 OFFICE O/P EST MOD 30 MIN: CPT | Performed by: STUDENT IN AN ORGANIZED HEALTH CARE EDUCATION/TRAINING PROGRAM

## 2023-11-17 PROCEDURE — 80061 LIPID PANEL: CPT | Performed by: STUDENT IN AN ORGANIZED HEALTH CARE EDUCATION/TRAINING PROGRAM

## 2023-11-17 PROCEDURE — 90480 ADMN SARSCOV2 VAC 1/ONLY CMP: CPT | Performed by: STUDENT IN AN ORGANIZED HEALTH CARE EDUCATION/TRAINING PROGRAM

## 2023-11-17 PROCEDURE — 80053 COMPREHEN METABOLIC PANEL: CPT | Performed by: STUDENT IN AN ORGANIZED HEALTH CARE EDUCATION/TRAINING PROGRAM

## 2023-11-17 PROCEDURE — 36415 COLL VENOUS BLD VENIPUNCTURE: CPT | Performed by: STUDENT IN AN ORGANIZED HEALTH CARE EDUCATION/TRAINING PROGRAM

## 2023-11-17 PROCEDURE — 91320 SARSCV2 VAC 30MCG TRS-SUC IM: CPT | Performed by: STUDENT IN AN ORGANIZED HEALTH CARE EDUCATION/TRAINING PROGRAM

## 2023-11-17 RX ORDER — ZOLPIDEM TARTRATE 10 MG/1
10 TABLET ORAL
Qty: 30 TABLET | Refills: 0 | Status: CANCELLED | OUTPATIENT
Start: 2023-11-17

## 2023-11-17 RX ORDER — SIMVASTATIN 20 MG
20 TABLET ORAL AT BEDTIME
Qty: 90 TABLET | Refills: 3 | Status: SHIPPED | OUTPATIENT
Start: 2023-11-17

## 2023-11-17 NOTE — PROGRESS NOTES
Assessment & Plan     Hyperlipidemia LDL goal <130  Recheck labs and adjust medications now  - simvastatin (ZOCOR) 20 MG tablet; Take 1 tablet (20 mg) by mouth at bedtime Needs an office appointment with labs for further refills  - Lipid panel reflex to direct LDL Fasting; Future  - Comprehensive metabolic panel (BMP + Alb, Alk Phos, ALT, AST, Total. Bili, TP); Future  - Lipid panel reflex to direct LDL Fasting  - Comprehensive metabolic panel (BMP + Alb, Alk Phos, ALT, AST, Total. Bili, TP)    Insomnia, unspecified type  Takes ambien as needed when traveling. Has been traveling less. She is now using 1/2 tablet (5mg) as needed for sleep.     Screening for diabetes mellitus  - Comprehensive metabolic panel (BMP + Alb, Alk Phos, ALT, AST, Total. Bili, TP); Future  - Comprehensive metabolic panel (BMP + Alb, Alk Phos, ALT, AST, Total. Bili, TP)    Elevated TSH  - TSH with free T4 reflex; Future  - TSH with free T4 reflex    Personal history of tobacco use  Due for lung cancer screening. She is now 15 years since quitting smoking   - CT Chest Lung Cancer Scrn Low Dose wo; Future      Vivian Cain DO  [facility]   Isela is a 58 year old, presenting for the following health issues:  Screening        11/17/2023     8:26 AM   Additional Questions   Accompanied by na         11/17/2023     8:26 AM   Patient Reported Additional Medications   Patient reports taking the following new medications none       History of Present Illness       Reason for visit:  Lung screening    She eats 2-3 servings of fruits and vegetables daily.She consumes 0 sweetened beverage(s) daily.She exercises with enough effort to increase her heart rate 9 or less minutes per day.  She exercises with enough effort to increase her heart rate 3 or less days per week.   She is taking medications regularly.           Immunization-  Covid vacine    Fasting    Hyperlipidemia Follow-Up    Are you regularly  taking any medication or supplement to lower your cholesterol?   Yes- Simvastatin   Are you having muscle aches or other side effects that you think could be caused by your cholesterol lowering medication?  No    Insomnia-   PRN when travels  Tried 1/2 tablet (5mg) and     Lung cancer screening:   Former smoker. Quit at age 43.   Smoked for 20 years. On average1 PPD.   No SOB, WALTERS, cough, hemoptysis, wheezing.       Balanced diet.   Not exercising except yard work.   Had plantar fascitis on right foot. Stopped aerobics class because of this.         Review of Systems   Constitutional, HEENT, cardiovascular, pulmonary, gi and gu systems are negative, except as otherwise noted.      Objective    /80 (Cuff Size: Adult Regular)   Pulse 80   Temp 98  F (36.7  C) (Oral)   Wt 73 kg (161 lb)   LMP 07/13/2007   SpO2 98%   BMI 23.78 kg/m    Body mass index is 23.78 kg/m .  Physical Exam   GENERAL: healthy, alert and no distress  EYES: Eyes grossly normal to inspection, PERRL and conjunctivae and sclerae normal  HENT: ear canals and TM's normal, nose and mouth without ulcers or lesions  NECK: no adenopathy, no asymmetry, masses, or scars and thyroid normal to palpation  RESP: lungs clear to auscultation - no rales, rhonchi or wheezes  CV: regular rate and rhythm, normal S1 S2, no S3 or S4, no murmur, click or rub, no peripheral edema and peripheral pulses strong  ABDOMEN: soft, nontender, no hepatosplenomegaly, no masses and bowel sounds normal

## 2023-11-27 ENCOUNTER — TELEPHONE (OUTPATIENT)
Dept: GASTROENTEROLOGY | Facility: CLINIC | Age: 58
End: 2023-11-27
Payer: COMMERCIAL

## 2023-11-27 NOTE — TELEPHONE ENCOUNTER
Caller: Isela Cartwright   Reason for Reschedule/Cancellation (please be detailed, any staff messages or encounters to note?): not feeling well and has a cold best to reschedule she states      Prior to reschedule please review:  Ordering Provider: Ant  Sedation per order: moderate  Does patient have any ASC Exclusions, please identify?: n      Notes on Cancelled Procedure:  Procedure: Lower Endoscopy [Colonoscopy]   Date: 11/29  Location: [facility]; [address] [place] [address], [place] [address]  Surgeon: Emmanuelle      Rescheduled: Yes  Procedure: Lower Endoscopy [Colonoscopy]   Date: 3/15  Location: [facility]; [address] [place] [address], [address] [place] [address]  Surgeon: Jerman  Sedation Level Scheduled  moderate,  Reason for Sedation Level ordered  Prep/Instructions updated and sent: Yes       Send In - basket message to Panc - Bryson Pool if EUS  procedure is canceled or rescheduled: [ N/A, YES or NO] na/

## 2023-12-04 ENCOUNTER — ANCILLARY PROCEDURE (OUTPATIENT)
Dept: CT IMAGING | Facility: CLINIC | Age: 58
End: 2023-12-04
Attending: STUDENT IN AN ORGANIZED HEALTH CARE EDUCATION/TRAINING PROGRAM
Payer: COMMERCIAL

## 2023-12-04 DIAGNOSIS — Z87.891 PERSONAL HISTORY OF TOBACCO USE: ICD-10-CM

## 2023-12-04 PROCEDURE — 71271 CT THORAX LUNG CANCER SCR C-: CPT | Mod: GC | Performed by: RADIOLOGY

## 2024-03-04 NOTE — TELEPHONE ENCOUNTER
Attempted to contact patient in order to complete pre assessment questions.     Patient scheduled for Colonoscopy  on 3/15/24.     No answer. Left message to return call to [phone] option 4    Missed call communication sent via [employer].      Martha Corona RN  Endoscopy Procedure Pre Assessment RN

## 2024-03-04 NOTE — TELEPHONE ENCOUNTER
Rescheduled colonoscopy    Pre visit planning completed.      Procedure details:    Patient scheduled for Colonoscopy  on 3/15/24.     Arrival time: 0800. Procedure time 0900    Pre op exam needed? N/A    Facility location: [facility]; [address] [place] [address], [address]th Floor, [place] [address]    Sedation type: Conscious sedation     Indication for procedure: Screening      Chart review:     Electronic implanted devices? No    Recent diagnosis of diverticulitis within the last 6 weeks? No    Diabetic? No    Diabetic medication HOLDING recommendations: (if applicable)  Oral diabetic medications: No  Diabetic injectables: No  Insulin: No      Medication review:    Anticoagulants? No    NSAIDS? No    Other medication HOLDING recommendations:  N/A      Prep for procedure:     Bowel prep recommendation: Miralax prep without magnesium citrate   Due to:  standard prep due to recall.    Prep instructions sent via [employer]       Olive Yanes RN  Endoscopy Procedure Pre Assessment RN  [phone] option 4

## 2024-03-05 NOTE — TELEPHONE ENCOUNTER
Pre assessment completed for upcoming procedure.   (Please see previous telephone encounter notes for complete details)    Patient  returned call.       Procedure details:    Arrival time and facility location reviewed.    Pre op exam needed? N/A    Designated  policy reviewed. Instructed to have someone stay 6  hours post procedure.       Medication review:    Medications reviewed. Please see supporting documentation below. Holding recommendations discussed (if applicable).   N/A      Prep for procedure:     Procedure prep instructions reviewed.        Any additional information needed:  N/A      Patient  verbalized understanding and had no questions or concerns at this time.      Olive Hayes RN  Endoscopy Procedure Pre Assessment RN  [phone] option 4

## 2024-03-13 ENCOUNTER — TELEPHONE (OUTPATIENT)
Dept: GASTROENTEROLOGY | Facility: CLINIC | Age: 59
End: 2024-03-13
Payer: COMMERCIAL

## 2024-03-13 ENCOUNTER — HOSPITAL ENCOUNTER (OUTPATIENT)
Facility: CLINIC | Age: 59
End: 2024-03-13
Attending: INTERNAL MEDICINE | Admitting: INTERNAL MEDICINE
Payer: COMMERCIAL

## 2024-03-13 NOTE — TELEPHONE ENCOUNTER
Pre visit planning completed.      Procedure details:    Patient scheduled for Colonoscopy  on 3/25/2024.     Arrival time: 0730. Procedure time 0830    Pre op exam needed? N/A    Facility location: [facility]; [address] [place] [address], 3rd Floor, [place] [address]    Sedation type: Conscious sedation     Indication for procedure: Special screening for malignant neoplasms, colon [Z12.11]       Chart review:     Electronic implanted devices? No    Recent diagnosis of diverticulitis within the last 6 weeks? No    Diabetic? No    Diabetic medication HOLDING recommendations: (if applicable)  Oral diabetic medications: N/A  Diabetic injectables: N/A  Insulin: N/A      Medication review:    Anticoagulants? No    NSAIDS? No NSAID medications per patient's medication list.  RN will verify with pre-assessment call.    Other medication HOLDING recommendations:  N/A      Prep for procedure:     Bowel prep recommendation: Standard Miralax  Due to: standard bowel prep.    Prep instructions sent via [employer]         Jacqueline Ohara RN  Endoscopy Procedure Pre Assessment RN  [phone] option 4

## 2024-03-13 NOTE — TELEPHONE ENCOUNTER
Caller: Isela    Reason for Reschedule/Cancellation   (please be detailed, any staff messages or encounters to note?): Patient ate      Prior to reschedule please review:  Ordering Provider: Mae  Sedation Determined: CS  Does patient have any ASC Exclusions, please identify?: N      Notes on Cancelled Procedure:  Procedure: Lower Endoscopy [Colonoscopy]   Date: 3/15/24  Location: [facility]; [address], [address], [place] [address]  Surgeon: Joni      Rescheduled: Yes,   Procedure: Lower Endoscopy [Colonoscopy]    Date: 3/25/24   Location: [facility]; [address], [address], [place] [address]    Surgeon: Annabelle   Sedation Level Scheduled  CS ,  Reason for Sedation Level Protocol   Instructions updated and sent: Yes     Does patient need PAC or Pre -Op Rescheduled? : N       Did you cancel or rescheduled an EUS procedure? No.

## 2024-03-13 NOTE — TELEPHONE ENCOUNTER
Patient called in stating she ate steamed vegetables, specifically steamed peas and was unaware until now that this was not allowed on the low fiber diet. Discussed option to proceed with procedure but informed patient that there is a possibility that the bowel prep may be inadequate and procedure could be aborted/additional prep needed due to not following dietary modifications. Patient did not want to take this risk and opted to reschedule the colonoscopy procedure.    Warm transferred to Merna in endoscopy scheduling to assist with rescheduling.     Jeimy Yost RN  Endoscopy Procedure Pre-Assessment RN  [phone], option 4

## 2024-03-13 NOTE — TELEPHONE ENCOUNTER
Pre assessment completed for upcoming procedure.   (Please see previous telephone encounter notes for complete details)    Patient  returned call.       Procedure details:    Arrival time and facility location reviewed.    Pre op exam needed? N/A    Designated  policy reviewed. Instructed to have someone stay 6  hours post procedure.       Medication review:    Medications reviewed. Please see supporting documentation below. Holding recommendations discussed (if applicable).       Prep for procedure:     Procedure prep instructions reviewed.        Any additional information needed:  N/A      Patient  verbalized understanding and had no questions or concerns at this time.      Jacqueline Ohara RN  Endoscopy Procedure Pre Assessment RN  [phone] option 4

## 2024-03-22 ENCOUNTER — TELEPHONE (OUTPATIENT)
Dept: GASTROENTEROLOGY | Facility: CLINIC | Age: 59
End: 2024-03-22
Payer: COMMERCIAL
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
## 2024-03-22 NOTE — TELEPHONE ENCOUNTER
Called back and her  can get her to  now so she is cancelled for 7/17 at [facility] and now on for           Rescheduled: Yes,   Procedure: Lower Endoscopy [Colonoscopy]    Date: 5/20   Location: [facility]; [address], [place] [address]    Surgeon: Pieter   Sedation Level Scheduled  moderate ,  Reason for Sedation Level ordered   Instructions updated and sent: y     Does patient need PAC or Pre -Op Rescheduled? : no       Did you cancel or rescheduled an EUS procedure? No.

## 2024-04-29 ENCOUNTER — OFFICE VISIT (OUTPATIENT)
Dept: DERMATOLOGY | Facility: CLINIC | Age: 59
End: 2024-04-29
Payer: COMMERCIAL

## 2024-04-29 DIAGNOSIS — Z12.83 SKIN CANCER SCREENING: Primary | ICD-10-CM

## 2024-04-29 DIAGNOSIS — L81.4 SOLAR LENTIGO: ICD-10-CM

## 2024-04-29 DIAGNOSIS — Z80.8 FAMILY HISTORY OF MELANOMA: ICD-10-CM

## 2024-04-29 DIAGNOSIS — L82.1 SEBORRHEIC KERATOSES: ICD-10-CM

## 2024-04-29 DIAGNOSIS — L81.4 SOLAR LENTIGINOSIS: ICD-10-CM

## 2024-04-29 DIAGNOSIS — L70.0 ACNE VULGARIS: ICD-10-CM

## 2024-04-29 DIAGNOSIS — D23.9 DERMATOFIBROMA: ICD-10-CM

## 2024-04-29 DIAGNOSIS — D22.9 MULTIPLE BENIGN NEVI: ICD-10-CM

## 2024-04-29 DIAGNOSIS — D18.01 CHERRY ANGIOMA: ICD-10-CM

## 2024-04-29 PROCEDURE — 99213 OFFICE O/P EST LOW 20 MIN: CPT | Performed by: PHYSICIAN ASSISTANT

## 2024-04-29 RX ORDER — HYDROQUINONE 40 MG/G
CREAM TOPICAL
Qty: 30 G | Refills: 1 | Status: SHIPPED | OUTPATIENT
Start: 2024-04-29

## 2024-04-29 ASSESSMENT — PAIN SCALES - GENERAL: PAINLEVEL: NO PAIN (0)

## 2024-04-29 NOTE — PROGRESS NOTES
[facility] Dermatology Note  Encounter Date: Apr 29, 2024  Office Visit     Dermatology Problem List:  1. Acne Vulgaris   - previous tx: tretinoin 0.025% cream   2. Family history of melanoma and NMSC (mother)  - last TBSE 04/29/2024  3. Solar lentigines  - Hydroquinone 4% cream   4. AKs s/p cryo   5. ISKs, s/p cryo   ____________________________________________    Assessment & Plan:    # Lesion to monitor, right upper abdomen (7 mm brown macule)  - Recheck at next skin check    # Acne vulgaris, will monitor clinically  - discontinue tretinoin 0.025% cream.     # Skin cancer screening with multiple benign nevi, solar lentigines  - ABCDEs: Counseled ABCDEs of melanoma: Asymmetry, Border (irregularity), Color (not uniform, changes in color), Diameter (greater than 6 mm which is about the size of a pencil eraser), and Evolving (any changes in preexisting moles).  - Sun protection: Counseled SPF30+ sunscreen, UPF clothing, sun avoidance, tanning bed avoidance.  - continue Hydroquinone 4% cream    # Cherry angiomas and seborrheic keratoses, dermatofibromas  Benign, reassurance given.       Procedures Performed:   None.    Follow-up: 1 year(s) in-person, or earlier for new or changing lesions    Staff and Scribe:   Scribe Disclosure:   By signing my name below, I, Nicolasa Schroeder, attest that this documentation has been prepared under the direction and in the presence of Nidhi Trevino PA-C.  - Electronically Signed: Nicolasa Schroeder 04/29/24       Provider Disclosure:  I agree with above History, Review of Systems, Physical exam and Plan.  I have reviewed the content of the documentation and have edited it as needed. I have personally performed the services documented here and the documentation accurately represents those services and the decisions I have made.      Electronically signed by:  All risk, benefits and alternatives were discussed with patient.  Patient is in agreement and understands the  assessment and plan.  All questions were answered.  Sun Screen Education was given.   Return to Clinic annually or sooner as needed.   Nidhi Trevino PA-C      ____________________________________________    CC: Skin Check (Annual FBSE.  Spot of concern on the left cheek (red) )    HPI:  Ms. Isela Cartwright is a(n) 58 year old female who presents today as a return patient for FBSE. Last seen by myself on 04/24/2023.    Patient notes she had an area on her L cheek that continued to worsen, and Tretinoin did not assist whatsoever so she discontinued it.    Patient is otherwise feeling well, without additional skin concerns.    Labs Reviewed:  N/A    Physical exam:  Vitals: LMP 07/13/2007   GEN: This is a well developed, well-nourished female in no acute distress, in a pleasant mood.    SKIN: Full skin, which includes the head/face, both arms, chest, back, abdomen,both legs, genitalia and/or groin buttocks, digits and/or nails, was examined.  - brown 7 mm uniform symmetric macule on R upper abdomen  - There is a firm tan/flesh colored papule that dimples with lateral pressure on the bilateral posterior shoulders.  - light brown patches to face consistent with lentigines  - There are dome shaped bright red papules on the head/neck, trunk, extremities.   - Multiple regular brown pigmented macules and papules are identified on the head/neck, trunk, extremities.   - Scattered brown macules on sun exposed areas.  - There are waxy stuck on tan to brown papules on the head/neck, trunk, extremities.    - No other lesions of concern on areas examined.       Medications:  Current Outpatient Medications   Medication Sig Dispense Refill    Calcium Carbonate-Vitamin D (CALCIUM 500 + D PO) Take 1 tablet by mouth daily.      hydroquinone (KAVIN) 4 % external cream Use once a day to the affected areas on the right side of your face 30 g 1    simvastatin (ZOCOR) 20 MG tablet Take 1 tablet (20 mg) by mouth at bedtime Needs an office  appointment with labs for further refills 90 tablet 3    tretinoin (RETIN-A) 0.025 % external cream Use nightly on entire face 45 g 3    zolpidem (AMBIEN) 10 MG tablet Take 1 tablet (10 mg) by mouth nightly as needed for sleep ++NEEDS APPOINTMENT++ 30 tablet 0     No current facility-administered medications for this visit.      Past Medical History:   Patient Active Problem List   Diagnosis    Insomnia    Recurrent UTI    Hyperlipidemia LDL goal <160    Vaginal atrophy    Weight gain    Pain of finger of left hand    Plantar fasciitis     Past Medical History:   Diagnosis Date    Recurrent UTI 11/19/2009    Tobacco use disorder     quit in 2008        CC No referring provider defined for this encounter. on close of this encounter.

## 2024-04-29 NOTE — LETTER
4/29/2024       RE: Isela Cartwright  [address]  [place] [address]     Dear Colleague,    Thank you for referring your patient, Isela Cartwright, to the [facility] [place] at [facility]. Please see a copy of my visit note below.    [facility] Dermatology Note  Encounter Date: Apr 29, 2024  Office Visit     Dermatology Problem List:  1. Acne Vulgaris   - previous tx: tretinoin 0.025% cream   2. Family history of melanoma and NMSC (mother)  - last TBSE 04/29/2024  3. Solar lentigines  - Hydroquinone 4% cream   4. AKs s/p cryo   5. ISKs, s/p cryo   ____________________________________________    Assessment & Plan:    # Lesion to monitor, right upper abdomen (7 mm brown macule)  - Recheck at next skin check    # Acne vulgaris, will monitor clinically  - discontinue tretinoin 0.025% cream.     # Skin cancer screening with multiple benign nevi, solar lentigines  - ABCDEs: Counseled ABCDEs of melanoma: Asymmetry, Border (irregularity), Color (not uniform, changes in color), Diameter (greater than 6 mm which is about the size of a pencil eraser), and Evolving (any changes in preexisting moles).  - Sun protection: Counseled SPF30+ sunscreen, UPF clothing, sun avoidance, tanning bed avoidance.  - continue Hydroquinone 4% cream    # Cherry angiomas and seborrheic keratoses, dermatofibromas  Benign, reassurance given.       Procedures Performed:   None.    Follow-up: 1 year(s) in-person, or earlier for new or changing lesions    Staff and Scribe:   Scribe Disclosure:   By signing my name below, I, Nicolasa Schroeder, attest that this documentation has been prepared under the direction and in the presence of Nidhi Trevino PA-C.  - Electronically Signed: Nicolasa Schroeder 04/29/24       Provider Disclosure:  I agree with above History, Review of Systems, Physical exam and Plan.  I have reviewed the content of the  documentation and have edited it as needed. I have personally performed the services documented here and the documentation accurately represents those services and the decisions I have made.      Electronically signed by:  All risk, benefits and alternatives were discussed with patient.  Patient is in agreement and understands the assessment and plan.  All questions were answered.  Sun Screen Education was given.   Return to Clinic annually or sooner as needed.   Nidhi Trevino PA-C    ____________________________________________    CC: Skin Check (Annual FBSE.  Spot of concern on the left cheek (red) )    HPI:  Ms. Isela Cartwright is a(n) 58 year old female who presents today as a return patient for FBSE. Last seen by myself on 04/24/2023.    Patient notes she had an area on her L cheek that continued to worsen, and Tretinoin did not assist whatsoever so she discontinued it.    Patient is otherwise feeling well, without additional skin concerns.    Labs Reviewed:  N/A    Physical exam:  Vitals: LMP 07/13/2007   GEN: This is a well developed, well-nourished female in no acute distress, in a pleasant mood.    SKIN: Full skin, which includes the head/face, both arms, chest, back, abdomen,both legs, genitalia and/or groin buttocks, digits and/or nails, was examined.  - brown 7 mm uniform symmetric macule on R upper abdomen  - There is a firm tan/flesh colored papule that dimples with lateral pressure on the bilateral posterior shoulders.  - light brown patches to face consistent with lentigines  - There are dome shaped bright red papules on the head/neck, trunk, extremities.   - Multiple regular brown pigmented macules and papules are identified on the head/neck, trunk, extremities.   - Scattered brown macules on sun exposed areas.  - There are waxy stuck on tan to brown papules on the head/neck, trunk, extremities.    - No other lesions of concern on areas examined.     Medications:  Current Outpatient Medications    Medication Sig Dispense Refill    Calcium Carbonate-Vitamin D (CALCIUM 500 + D PO) Take 1 tablet by mouth daily.      hydroquinone (KAVIN) 4 % external cream Use once a day to the affected areas on the right side of your face 30 g 1    simvastatin (ZOCOR) 20 MG tablet Take 1 tablet (20 mg) by mouth at bedtime Needs an office appointment with labs for further refills 90 tablet 3    tretinoin (RETIN-A) 0.025 % external cream Use nightly on entire face 45 g 3    zolpidem (AMBIEN) 10 MG tablet Take 1 tablet (10 mg) by mouth nightly as needed for sleep ++NEEDS APPOINTMENT++ 30 tablet 0     No current facility-administered medications for this visit.      Past Medical History:   Patient Active Problem List   Diagnosis    Insomnia    Recurrent UTI    Hyperlipidemia LDL goal <160    Vaginal atrophy    Weight gain    Pain of finger of left hand    Plantar fasciitis     Past Medical History:   Diagnosis Date    Recurrent UTI 11/19/2009    Tobacco use disorder     quit in 2008      CC No referring provider defined for this encounter. on close of this encounter.

## 2024-04-29 NOTE — NURSING NOTE
Dermatology Rooming Note    Isela Cartwright's goals for this visit include:   Chief Complaint   Patient presents with    Skin Check     Annual FBSE.  Spot of concern on the left cheek (red)      Margaret Awad CMA

## 2024-04-29 NOTE — PATIENT INSTRUCTIONS

## 2024-05-09 NOTE — TELEPHONE ENCOUNTER
Rescheduled colonoscopy    Pre visit planning completed.      Procedure details:    Patient scheduled for Colonoscopy  on 5.20.24.     Arrival time: 0715. Procedure time 0800    Facility location: [facility]; [address], [place] [address]. Check in location: 2nd Floor at Surgery desk.    Sedation type: Conscious sedation     Pre op exam needed? N/A    Indication for procedure: screening      Chart review:     Electronic implanted devices? No    Recent diagnosis of diverticulitis within the last 6 weeks? No    Diabetic? No      Medication review:    Anticoagulants? No    NSAIDS? No NSAID medications per patient's medication list.  RN will verify with pre-assessment call.    Other medication HOLDING recommendations:  N/A      Prep for procedure:     Bowel prep recommendation: Standard Miralax  Due to: standard bowel prep.    Prep instructions sent via Helpayunior Jain RN  Endoscopy Procedure Pre Assessment RN  [phone] option 4

## 2024-05-09 NOTE — TELEPHONE ENCOUNTER
Pre assessment completed for upcoming procedure.   (Please see previous telephone encounter notes for complete details)      Procedure details:    Arrival time and facility location reviewed.    Pre op exam needed? N/A    Designated  policy reviewed. Instructed to have someone stay 6  hours post procedure.       Medication review:    Medications reviewed. Please see supporting documentation below. Holding recommendations discussed (if applicable).       Prep for procedure:     Procedure prep instructions reviewed.        Any additional information needed:  N/A      Patient  verbalized understanding and had no questions or concerns at this time.      Shaniqua Dumont RN  Endoscopy Procedure Pre Assessment RN  [phone] option 4

## 2024-05-13 RX ORDER — NALOXONE HYDROCHLORIDE 0.4 MG/ML
0.2 INJECTION, SOLUTION INTRAMUSCULAR; INTRAVENOUS; SUBCUTANEOUS
Status: CANCELLED | OUTPATIENT
Start: 2024-05-13

## 2024-05-13 RX ORDER — NALOXONE HYDROCHLORIDE 0.4 MG/ML
0.4 INJECTION, SOLUTION INTRAMUSCULAR; INTRAVENOUS; SUBCUTANEOUS
Status: CANCELLED | OUTPATIENT
Start: 2024-05-13

## 2024-05-13 RX ORDER — ONDANSETRON 4 MG/1
4 TABLET, ORALLY DISINTEGRATING ORAL EVERY 6 HOURS PRN
Status: CANCELLED | OUTPATIENT
Start: 2024-05-13

## 2024-05-13 RX ORDER — FLUMAZENIL 0.1 MG/ML
0.2 INJECTION, SOLUTION INTRAVENOUS
Status: CANCELLED | OUTPATIENT
Start: 2024-05-13 | End: 2024-05-14

## 2024-05-13 RX ORDER — ONDANSETRON 2 MG/ML
4 INJECTION INTRAMUSCULAR; INTRAVENOUS EVERY 6 HOURS PRN
Status: CANCELLED | OUTPATIENT
Start: 2024-05-13

## 2024-05-13 RX ORDER — PROCHLORPERAZINE MALEATE 10 MG
10 TABLET ORAL EVERY 6 HOURS PRN
Status: CANCELLED | OUTPATIENT
Start: 2024-05-13

## 2024-05-20 ENCOUNTER — HOSPITAL ENCOUNTER (OUTPATIENT)
Facility: AMBULATORY SURGERY CENTER | Age: 59
Discharge: HOME | End: 2024-05-20
Attending: COLON & RECTAL SURGERY | Admitting: COLON & RECTAL SURGERY
Payer: COMMERCIAL

## 2024-05-20 VITALS
SYSTOLIC BLOOD PRESSURE: 109 MMHG | HEART RATE: 71 BPM | RESPIRATION RATE: 14 BRPM | OXYGEN SATURATION: 97 % | DIASTOLIC BLOOD PRESSURE: 77 MMHG | TEMPERATURE: 97.6 F

## 2024-05-20 LAB — COLONOSCOPY: NORMAL

## 2024-05-20 PROCEDURE — G8907 PT DOC NO EVENTS ON DISCHARG: HCPCS

## 2024-05-20 PROCEDURE — 45380 COLONOSCOPY AND BIOPSY: CPT

## 2024-05-20 PROCEDURE — G8918 PT W/O PREOP ORDER IV AB PRO: HCPCS

## 2024-05-20 PROCEDURE — 88305 TISSUE EXAM BY PATHOLOGIST: CPT | Performed by: PATHOLOGY

## 2024-05-20 RX ORDER — ONDANSETRON 2 MG/ML
4 INJECTION INTRAMUSCULAR; INTRAVENOUS
Status: DISCONTINUED | OUTPATIENT
Start: 2024-05-20 | End: 2024-05-21 | Stop reason: HOSPADM

## 2024-05-20 RX ORDER — LIDOCAINE 40 MG/G
CREAM TOPICAL
Status: DISCONTINUED | OUTPATIENT
Start: 2024-05-20 | End: 2024-05-21 | Stop reason: HOSPADM

## 2024-05-20 RX ORDER — FENTANYL CITRATE 50 UG/ML
INJECTION, SOLUTION INTRAMUSCULAR; INTRAVENOUS PRN
Status: DISCONTINUED | OUTPATIENT
Start: 2024-05-20 | End: 2024-05-20 | Stop reason: HOSPADM

## 2024-05-20 NOTE — H&P
Pre-Endoscopy History and Physical     Isela Cartwright MRN# [id number]   YOB: 1965 Age: 59 year old     Date of Procedure: 05/20/24  Primary care provider: Vivian Cain  Type of Endoscopy: Colonoscopy  Reason for Procedure: screening  Type of Anesthesia Anticipated: Moderate Sedation    HPI:    Isela is a 59 year old female who will be undergoing the above procedure.      Prior colonoscopy: 2013    A history and physical has been performed. The patient's medications and allergies have been reviewed. The risks and benefits of the procedure and the sedation options and risks were discussed with the patient.  All questions were answered and informed consent was obtained.      She denies a personal or family history of anesthesia complications or bleeding disorders. No family history of CRC or IBD.    Allergies   Allergen Reactions    Amoxicillin Rash    Bees     Sulfa Antibiotics Rash    Tetracycline      Mild itching; not sure if this is true allergy      Allergy to Latex: NO   Allergy to tape: NO   Intolerances: NO     Current Outpatient Medications   Medication Sig Dispense Refill    Calcium Carbonate-Vitamin D (CALCIUM 500 + D PO) Take 1 tablet by mouth daily.      simvastatin (ZOCOR) 20 MG tablet Take 1 tablet (20 mg) by mouth at bedtime Needs an office appointment with labs for further refills 90 tablet 3    hydroquinone (KAVIN) 4 % external cream Use bid up to 2 months to the affected areas on the right side of your face 30 g 1    tretinoin (RETIN-A) 0.025 % external cream Use nightly on entire face 45 g 3    zolpidem (AMBIEN) 10 MG tablet Take 1 tablet (10 mg) by mouth nightly as needed for sleep ++NEEDS APPOINTMENT++ 30 tablet 0     Current Facility-Administered Medications   Medication Dose Route Frequency Provider Last Rate Last Admin    lidocaine (LMX4) kit   Topical Q1H PRMicheline Shaver MD        lidocaine 1 % 0.1-1 mL  0.1-1 mL Other Q1H Micheline Lombardi MD         ondansetron (ZOFRAN) injection 4 mg  4 mg Intravenous Once PRN Micheline Stapleton MD        sodium chloride (PF) 0.9% PF flush 3 mL  3 mL Intracatheter Q8H Micheline Stapleton MD        sodium chloride (PF) 0.9% PF flush 3 mL  3 mL Intracatheter q1 min prn Micheline Stapleton MD           Patient Active Problem List   Diagnosis    Insomnia    Recurrent UTI    Hyperlipidemia LDL goal <160    Vaginal atrophy    Weight gain    Pain of finger of left hand    Plantar fasciitis        Past Medical History:   Diagnosis Date    Recurrent UTI 2009    Tobacco use disorder     quit in         Past Surgical History:   Procedure Laterality Date    COLONOSCOPY  2013    GENITOURINARY SURGERY  1988    burned uretha, chronic urinary tract infections before    SURGICAL HISTORY OF -       urethral dilation from UTI       Social History     Tobacco Use    Smoking status: Former     Current packs/day: 0.00     Average packs/day: 0.5 packs/day for 15.0 years (7.5 ttl pk-yrs)     Types: Cigarettes     Start date: 1993     Quit date: 2008     Years since quittin.3    Smokeless tobacco: Never   Substance Use Topics    Alcohol use: Yes     Comment: OCCAS       Family History   Problem Relation Age of Onset    Skin Cancer Mother     Melanoma Mother     Hypertension Father     Hyperlipidemia Father     Cancer - colorectal Maternal Grandmother     Cerebrovascular Disease Maternal Grandmother         TIAs    Skin Cancer Maternal Grandmother     Heart Disease Maternal Grandfather         cardiomegaly;  at 98    Breast Cancer Maternal Aunt         mom's half sister    Breast Cancer Cousin         paternal     Breast Cancer Cousin     C.A.D. No family hx of         no deaths at young age, bypass, etc.    Diabetes No family hx of         father is borderline       REVIEW OF SYSTEMS:     5 point ROS negative except as noted above in HPI, including Gen., Resp., CV, GI &  system review.      PHYSICAL EXAM:   BP  "110/80   Pulse 92   Temp 97.3  F (36.3  C) (Temporal)   Resp 16   LMP 07/13/2007   SpO2 100%  Estimated body mass index is 23.78 kg/m  as calculated from the following:    Height as of 3/24/23: 1.753 m (5' 9\").    Weight as of 11/17/23: 73 kg (161 lb).   All Vitals have been reviewed.    GENERAL APPEARANCE: healthy and alert  MENTAL STATUS: alert  AIRWAY EXAM: Mallampatti Class I (visualization of the soft palate, fauces, uvula, anterior and posterior pillars)  RESP: lungs clear to auscultation - no rales, rhonchi or wheezes  CV: regular rates and rhythm      IMPRESSION   ASA Class 2 - Mild systemic disease        PLAN:     Plan for colonoscopy. We discussed the risks, benefits and alternatives and the patient wished to proceed.    The above has been forwarded to the consulting provider.    Micheline Stapleton MD, MS, FACS, FASCRS  [facility]  Office: [phone]  5/20/2024 8:01 AM         "

## 2024-05-22 LAB
PATH REPORT.COMMENTS IMP SPEC: NORMAL
PATH REPORT.COMMENTS IMP SPEC: NORMAL
PATH REPORT.FINAL DX SPEC: NORMAL
PATH REPORT.GROSS SPEC: NORMAL
PATH REPORT.RELEVANT HX SPEC: NORMAL
PHOTO IMAGE: NORMAL

## 2024-05-28 ENCOUNTER — PATIENT OUTREACH (OUTPATIENT)
Dept: CARE COORDINATION | Facility: CLINIC | Age: 59
End: 2024-05-28
Payer: COMMERCIAL

## 2024-06-03 ENCOUNTER — PATIENT OUTREACH (OUTPATIENT)
Dept: GASTROENTEROLOGY | Facility: CLINIC | Age: 59
End: 2024-06-03
Payer: COMMERCIAL

## 2024-06-11 ENCOUNTER — PATIENT OUTREACH (OUTPATIENT)
Dept: CARE COORDINATION | Facility: CLINIC | Age: 59
End: 2024-06-11
Payer: COMMERCIAL

## 2024-07-15 ENCOUNTER — PATIENT OUTREACH (OUTPATIENT)
Dept: CARE COORDINATION | Facility: CLINIC | Age: 59
End: 2024-07-15
Payer: COMMERCIAL

## 2024-08-04 ENCOUNTER — HEALTH MAINTENANCE LETTER (OUTPATIENT)
Age: 59
End: 2024-08-04

## 2024-08-12 ENCOUNTER — PATIENT OUTREACH (OUTPATIENT)
Dept: CARE COORDINATION | Facility: CLINIC | Age: 59
End: 2024-08-12
Payer: COMMERCIAL

## 2024-08-16 ENCOUNTER — ANCILLARY PROCEDURE (OUTPATIENT)
Dept: MAMMOGRAPHY | Facility: CLINIC | Age: 59
End: 2024-08-16
Attending: STUDENT IN AN ORGANIZED HEALTH CARE EDUCATION/TRAINING PROGRAM
Payer: COMMERCIAL

## 2024-08-16 DIAGNOSIS — Z12.31 VISIT FOR SCREENING MAMMOGRAM: ICD-10-CM

## 2024-08-16 PROCEDURE — 77067 SCR MAMMO BI INCL CAD: CPT | Mod: GC | Performed by: RADIOLOGY

## 2024-08-16 PROCEDURE — 77063 BREAST TOMOSYNTHESIS BI: CPT | Mod: GC | Performed by: RADIOLOGY

## 2024-08-22 ENCOUNTER — OFFICE VISIT (OUTPATIENT)
Dept: OBGYN | Facility: CLINIC | Age: 59
End: 2024-08-22
Payer: COMMERCIAL

## 2024-08-22 VITALS
HEART RATE: 89 BPM | SYSTOLIC BLOOD PRESSURE: 130 MMHG | TEMPERATURE: 97.7 F | OXYGEN SATURATION: 99 % | DIASTOLIC BLOOD PRESSURE: 81 MMHG | WEIGHT: 165.9 LBS | BODY MASS INDEX: 24.5 KG/M2

## 2024-08-22 DIAGNOSIS — Z12.4 SCREENING FOR CERVICAL CANCER: ICD-10-CM

## 2024-08-22 DIAGNOSIS — D12.6 TUBULAR ADENOMA OF COLON: ICD-10-CM

## 2024-08-22 DIAGNOSIS — Z00.00 ROUTINE GENERAL MEDICAL EXAMINATION AT A HEALTH CARE FACILITY: Primary | ICD-10-CM

## 2024-08-22 PROCEDURE — G0145 SCR C/V CYTO,THINLAYER,RESCR: HCPCS | Performed by: OBSTETRICS & GYNECOLOGY

## 2024-08-22 PROCEDURE — 99396 PREV VISIT EST AGE 40-64: CPT | Performed by: OBSTETRICS & GYNECOLOGY

## 2024-08-22 PROCEDURE — 87624 HPV HI-RISK TYP POOLED RSLT: CPT | Performed by: OBSTETRICS & GYNECOLOGY

## 2024-08-22 ASSESSMENT — ANXIETY QUESTIONNAIRES
5. BEING SO RESTLESS THAT IT IS HARD TO SIT STILL: NOT AT ALL
GAD7 TOTAL SCORE: 2
1. FEELING NERVOUS, ANXIOUS, OR ON EDGE: SEVERAL DAYS
3. WORRYING TOO MUCH ABOUT DIFFERENT THINGS: NOT AT ALL
IF YOU CHECKED OFF ANY PROBLEMS ON THIS QUESTIONNAIRE, HOW DIFFICULT HAVE THESE PROBLEMS MADE IT FOR YOU TO DO YOUR WORK, TAKE CARE OF THINGS AT HOME, OR GET ALONG WITH OTHER PEOPLE: NOT DIFFICULT AT ALL
6. BECOMING EASILY ANNOYED OR IRRITABLE: NOT AT ALL
7. FEELING AFRAID AS IF SOMETHING AWFUL MIGHT HAPPEN: NOT AT ALL
GAD7 TOTAL SCORE: 2
2. NOT BEING ABLE TO STOP OR CONTROL WORRYING: NOT AT ALL

## 2024-08-22 ASSESSMENT — PATIENT HEALTH QUESTIONNAIRE - PHQ9
SUM OF ALL RESPONSES TO PHQ QUESTIONS 1-9: 0
5. POOR APPETITE OR OVEREATING: SEVERAL DAYS

## 2024-08-22 NOTE — PROGRESS NOTES
"Isela is a 59 year old  who presents for annual exam.   Postmenopausal since/years?.  She is having hot flashes, mild. No vaginal bleeding noted.   Sleeping at her baseline.    Besides routine health maintenance, Pap, No STD/Cholesterol and Is Not Fastin today re:Labs, No Medication Refills/No Referrals.  She will have her labs done by Vivian Cain   Feeling well.  Still working in the [employer] system for [employer].    Wt Readings from Last 4 Encounters:   24 75.3 kg (165 lb 14.4 oz)   23 73 kg (161 lb)   23 71.8 kg (158 lb 6.4 oz)   22 70.8 kg (156 lb)      discussed weight changes.  She will begin aerobics this winter.   GYNECOLOGIC HISTORY:  Menarche: 14   Age at first intercourse: 16  Number of lifetime partners: <6  She is sexually active with /only/male partner(s) and she is currently in monogamous relationship with .    History sexually transmitted infections:No STD history  STI testing offered?  No  Estrogen replacement therapy: No  JOHN exposure: No    History of abnormal Pap smear: YES - reflected in Problem List and Health Maintenance accordingly  Family history of breast CA: Yes (Please explain): Pcousin  Family history of uterine/ovarian CA: Yes (Please explain): MGM?  Family history of colon CA: No    HEALTH MAINTENANCE:  Cholesterol: (No components found for: \"CHOL2\" ) History of abnormal lipids: Yes  Mammo: 2024 . History of abnormal Mammo: No  Regular Self Breast Exams: Yes  Colonoscopy: 2024 History of abnormal Colonoscopy: No  Dexa: N/A History of abnormal Dexa: No  Calcium/Vitamin D intake: source:  dairy, dietary supplement(s) Adequate? Yes  TSH: (No components found for: \"TSH1\" )  Pap; (  Lab Results   Component Value Date    PAP NIL 2019    PAP NIL 2015    PAP NIL 07/10/2012    )    HISTORY:  OB History    Para Term  AB Living   1 0 0 0 1 0   SAB IAB Ectopic Multiple Live Births   0 1 0 0 0      # Outcome " Date GA Lbr Piyush/2nd Weight Sex Type Anes PTL Lv   1 IAB              Past Medical History:   Diagnosis Date    Recurrent UTI 2009    Tobacco use disorder     quit in      Past Surgical History:   Procedure Laterality Date    COLONOSCOPY      COLONOSCOPY N/A 2024    Procedure: COLONOSCOPY, WITH POLYPECTOMY AND BIOPSY;  Surgeon: Micheline Stapleton MD;  Location: [facility] OR    COLONOSCOPY WITH CO2 INSUFFLATION N/A 2024    Procedure: Colonoscopy with CO2 insufflation;  Surgeon: Micheline Stapleton MD;  Location: [facility] OR    GENITOURINARY SURGERY      burned uretha, chronic urinary tract infections before    SURGICAL HISTORY OF -       urethral dilation from UTI     Family History   Problem Relation Age of Onset    Skin Cancer Mother     Melanoma Mother     Hypertension Father     Hyperlipidemia Father     Cancer - colorectal Maternal Grandmother     Cerebrovascular Disease Maternal Grandmother         TIAs    Skin Cancer Maternal Grandmother     Heart Disease Maternal Grandfather         cardiomegaly;  at 98    Breast Cancer Maternal Aunt         mom's half sister    Breast Cancer Cousin         paternal     Breast Cancer Cousin     C.A.D. No family hx of         no deaths at young age, bypass, etc.    Diabetes No family hx of         father is borderline     Social History     Socioeconomic History    Marital status: Single   Tobacco Use    Smoking status: Former     Current packs/day: 0.00     Average packs/day: 0.5 packs/day for 15.0 years (7.5 ttl pk-yrs)     Types: Cigarettes     Start date: 1993     Quit date: 2008     Years since quittin.6    Smokeless tobacco: Never   Substance and Sexual Activity    Alcohol use: Yes     Comment: OCCAS    Drug use: No    Sexual activity: Not Currently     Partners: Male     Birth control/protection: None   Other Topics Concern    Parent/sibling w/ CABG, MI or angioplasty before 65F 55M? No     Social Determinants of Health     Financial  Resource Strain: Low Risk  (11/10/2023)    Financial Resource Strain     Within the past 12 months, have you or your family members you live with been unable to get utilities (heat, electricity) when it was really needed?: No   Food Insecurity: Low Risk  (11/10/2023)    Food Insecurity     Within the past 12 months, did you worry that your food would run out before you got money to buy more?: No     Within the past 12 months, did the food you bought just not last and you didn t have money to get more?: No   Transportation Needs: Low Risk  (11/10/2023)    Transportation Needs     Within the past 12 months, has lack of transportation kept you from medical appointments, getting your medicines, non-medical meetings or appointments, work, or from getting things that you need?: No   Interpersonal Safety: Low Risk  (11/17/2023)    Interpersonal Safety     Do you feel physically and emotionally safe where you currently live?: Yes     Within the past 12 months, have you been hit, slapped, kicked or otherwise physically hurt by someone?: No     Within the past 12 months, have you been humiliated or emotionally abused in other ways by your partner or ex-partner?: No   Housing Stability: Low Risk  (11/10/2023)    Housing Stability     Do you have housing? : Yes     Are you worried about losing your housing?: No       Current Outpatient Medications:     Calcium Carbonate-Vitamin D (CALCIUM 500 + D PO), Take 1 tablet by mouth daily., Disp: , Rfl:     hydroquinone (KAVIN) 4 % external cream, Use bid up to 2 months to the affected areas on the right side of your face, Disp: 30 g, Rfl: 1    simvastatin (ZOCOR) 20 MG tablet, Take 1 tablet (20 mg) by mouth at bedtime Needs an office appointment with labs for further refills, Disp: 90 tablet, Rfl: 3    tretinoin (RETIN-A) 0.025 % external cream, Use nightly on entire face, Disp: 45 g, Rfl: 3    zolpidem (AMBIEN) 10 MG tablet, Take 1 tablet (10 mg) by mouth nightly as needed for sleep  ++NEEDS APPOINTMENT++, Disp: 30 tablet, Rfl: 0     Allergies   Allergen Reactions    Amoxicillin Rash    Bees     Sulfa Antibiotics Rash    Tetracycline      Mild itching; not sure if this is true allergy       Past medical, surgical, social and family history were reviewed and updated in EPIC.    ROS:   C:       NEGATIVE for fever, chills, ++ change in weight  I:         NEGATIVE for worrisome rashes, moles or lesions  E:       NEGATIVE for vision changes or irritation  E/M:   NEGATIVE for ear, mouth and throat problems  R:       NEGATIVE for significant cough or SOB  CV:     NEGATIVE for chest pain, palpitations or peripheral edema  GI:      NEGATIVE for nausea, abdominal pain, heartburn, or change in bowel habits  :    NEGATIVE for frequency, dysuria, hematuria, vaginal discharge, or bleeding  M:       NEGATIVE for significant arthralgias or myalgia  N:       NEGATIVE for weakness, dizziness or paresthesias  E:       NEGATIVE for temperature intolerance, skin/hair changes  P:       NEGATIVE for changes in mood or affect    EXAM:  /81   Pulse 89   Temp 97.7  F (36.5  C) (Oral)   Wt 75.3 kg (165 lb 14.4 oz)   LMP 07/13/2007   SpO2 99%   Breastfeeding No   BMI 24.50 kg/m     BMI: Body mass index is 24.5 kg/m .  Constitutional: healthy, alert and no distress  Head: Normocephalic. No masses, lesions, tenderness or abnormalities  Neck: Neck supple. Trachea midline. No adenopathy. Thyroid symmetric, normal size.   Cardiovascular: RRR.   Respiratory: Negative.   Breast: No nodularity, asymmetry or nipple discharge bilaterally.  Gastrointestinal: Abdomen soft, non-tender, non-distended. No masses, organomegaly  :  Vulva:  No external lesions, normal female hair distribution   Urethra:  Midline, non-tender, well supported, no discharge  Vagina:  Atrophic, no abnormal discharge, no lesions  Uterus:  BME deferred per patient request,  hurt too much last time  Rectal Exam: deferred  Musculoskeletal:  extremities normal  Skin: no suspicious lesions or rashes  Psychiatric: Affect appropriate, cooperative,mentation appears normal.     COUNSELING:   Reviewed preventive health counseling, as reflected in patient instructions       Regular exercise       Healthy diet/nutrition       Vision screening       Alcohol Use       Folic Acid       Osteoporosis prevention/bone health       Safe sex practices/STD prevention       Colorectal Cancer Screening       ([place])menopause management       Advance Care Planning   reports that she quit smoking about 16 years ago. Her smoking use included cigarettes. She started smoking about 31 years ago. She has a 7.5 pack-year smoking history. She has never used smokeless tobacco.    Body mass index is 24.5 kg/m .      FRAX Risk Assessment  ASSESSMENT:  59 year old  with satisfactory annual exam  (Z00.00) Routine general medical examination at a health care facility  (primary encounter diagnosis)  Comment: discussed routine  health care maintenance   Plan: up to date on colonoscopy, mammogram, pap today  Reviewed weight gain, diet, lifestyle.       (D12.6) Tubular adenoma of colon  Comment:  one polyp on colonoscopy this May 2024  Plan: repeat in 3 yrs.   discussed diet to promote healthy colon.       (Z12.4) Screening for cervical cancer  Comment:    Plan: HPV and Gynecologic Cytology Panel -         Recommended Age 30-65 Years           Elizabeth Wall MD

## 2024-08-23 LAB
HPV HR 12 DNA CVX QL NAA+PROBE: NEGATIVE
HPV16 DNA CVX QL NAA+PROBE: NEGATIVE
HPV18 DNA CVX QL NAA+PROBE: NEGATIVE
HUMAN PAPILLOMA VIRUS FINAL DIAGNOSIS: NORMAL

## 2024-08-28 LAB
BKR AP ASSOCIATED HPV REPORT: NORMAL
BKR LAB AP GYN ADEQUACY: NORMAL
BKR LAB AP GYN INTERPRETATION: NORMAL
BKR LAB AP PREVIOUS ABNORMAL: NORMAL
PATH REPORT.COMMENTS IMP SPEC: NORMAL
PATH REPORT.COMMENTS IMP SPEC: NORMAL
PATH REPORT.RELEVANT HX SPEC: NORMAL

## 2024-12-23 ENCOUNTER — OFFICE VISIT (OUTPATIENT)
Dept: FAMILY MEDICINE | Facility: CLINIC | Age: 59
End: 2024-12-23
Payer: COMMERCIAL

## 2024-12-23 VITALS
DIASTOLIC BLOOD PRESSURE: 83 MMHG | SYSTOLIC BLOOD PRESSURE: 130 MMHG | WEIGHT: 168.8 LBS | HEART RATE: 85 BPM | HEIGHT: 69 IN | RESPIRATION RATE: 16 BRPM | OXYGEN SATURATION: 98 % | BODY MASS INDEX: 25 KG/M2 | TEMPERATURE: 97.7 F

## 2024-12-23 DIAGNOSIS — E78.5 HYPERLIPIDEMIA LDL GOAL <130: Primary | ICD-10-CM

## 2024-12-23 DIAGNOSIS — G47.00 INSOMNIA, UNSPECIFIED TYPE: ICD-10-CM

## 2024-12-23 DIAGNOSIS — Z87.891 PERSONAL HISTORY OF TOBACCO USE: ICD-10-CM

## 2024-12-23 LAB
ALBUMIN SERPL BCG-MCNC: 4.4 G/DL (ref 3.5–5.2)
ALP SERPL-CCNC: 88 U/L (ref 40–150)
ALT SERPL W P-5'-P-CCNC: 23 U/L (ref 0–50)
ANION GAP SERPL CALCULATED.3IONS-SCNC: 10 MMOL/L (ref 7–15)
AST SERPL W P-5'-P-CCNC: 26 U/L (ref 0–45)
BILIRUB SERPL-MCNC: 0.4 MG/DL
BUN SERPL-MCNC: 16.1 MG/DL (ref 8–23)
CALCIUM SERPL-MCNC: 9.8 MG/DL (ref 8.8–10.4)
CHLORIDE SERPL-SCNC: 108 MMOL/L (ref 98–107)
CHOLEST SERPL-MCNC: 219 MG/DL
CREAT SERPL-MCNC: 0.65 MG/DL (ref 0.51–0.95)
EGFRCR SERPLBLD CKD-EPI 2021: >90 ML/MIN/1.73M2
FASTING STATUS PATIENT QL REPORTED: YES
FASTING STATUS PATIENT QL REPORTED: YES
GLUCOSE SERPL-MCNC: 101 MG/DL (ref 70–99)
HCO3 SERPL-SCNC: 24 MMOL/L (ref 22–29)
HDLC SERPL-MCNC: 52 MG/DL
LDLC SERPL CALC-MCNC: 131 MG/DL
NONHDLC SERPL-MCNC: 167 MG/DL
POTASSIUM SERPL-SCNC: 4.8 MMOL/L (ref 3.4–5.3)
PROT SERPL-MCNC: 7 G/DL (ref 6.4–8.3)
SODIUM SERPL-SCNC: 142 MMOL/L (ref 135–145)
TRIGL SERPL-MCNC: 181 MG/DL

## 2024-12-23 PROCEDURE — 36415 COLL VENOUS BLD VENIPUNCTURE: CPT | Performed by: STUDENT IN AN ORGANIZED HEALTH CARE EDUCATION/TRAINING PROGRAM

## 2024-12-23 PROCEDURE — G0296 VISIT TO DETERM LDCT ELIG: HCPCS | Performed by: STUDENT IN AN ORGANIZED HEALTH CARE EDUCATION/TRAINING PROGRAM

## 2024-12-23 PROCEDURE — 80061 LIPID PANEL: CPT | Performed by: STUDENT IN AN ORGANIZED HEALTH CARE EDUCATION/TRAINING PROGRAM

## 2024-12-23 PROCEDURE — 80053 COMPREHEN METABOLIC PANEL: CPT | Performed by: STUDENT IN AN ORGANIZED HEALTH CARE EDUCATION/TRAINING PROGRAM

## 2024-12-23 PROCEDURE — 90480 ADMN SARSCOV2 VAC 1/ONLY CMP: CPT | Performed by: STUDENT IN AN ORGANIZED HEALTH CARE EDUCATION/TRAINING PROGRAM

## 2024-12-23 PROCEDURE — 91320 SARSCV2 VAC 30MCG TRS-SUC IM: CPT | Performed by: STUDENT IN AN ORGANIZED HEALTH CARE EDUCATION/TRAINING PROGRAM

## 2024-12-23 PROCEDURE — 99213 OFFICE O/P EST LOW 20 MIN: CPT | Performed by: STUDENT IN AN ORGANIZED HEALTH CARE EDUCATION/TRAINING PROGRAM

## 2024-12-23 RX ORDER — SIMVASTATIN 20 MG
20 TABLET ORAL AT BEDTIME
Qty: 90 TABLET | Refills: 3 | Status: SHIPPED | OUTPATIENT
Start: 2024-12-23

## 2024-12-23 ASSESSMENT — PAIN SCALES - GENERAL: PAINLEVEL_OUTOF10: NO PAIN (0)

## 2024-12-23 NOTE — PROGRESS NOTES
Assessment & Plan     Hyperlipidemia LDL goal <130  Recheck lipid panel. May need zocor  dose adjusted if LDL remains elevated. Tolerates this well.   - simvastatin (ZOCOR) 20 MG tablet; Take 1 tablet (20 mg) by mouth at bedtime.  - Comprehensive metabolic panel (BMP + Alb, Alk Phos, ALT, AST, Total. Bili, TP); Future  - Lipid panel reflex to direct LDL Fasting; Future    Insomnia, unspecified type  Uses ambien rarely with travel, doesn't need a refill at this time. Last filled over a year ago.     Personal history of tobacco use  Around 15 years since quitting smoking, she isn't sure of exact date. New 2 mm benign appearing nodule noted last year on CT that wasn't there on previous so recommend repeating CT screening exam.   - Prof fee: Shared Decision Making for Lung Cancer Screening  - CT Chest Lung Cancer Scrn Low Dose wo; Future                Subjective   Isela is a 59 year old, presenting for the following health issues:  Hyperlipidemia and Imm/Inj (Covid vaccine )        12/23/2024     6:58 AM   Additional Questions   Roomed by Kate   Accompanied by none         12/23/2024     6:58 AM   Patient Reported Additional Medications   Patient reports taking the following new medications none     History of Present Illness       Hyperlipidemia:  She presents for follow up of hyperlipidemia.   She is taking medication to lower cholesterol. She is not having myalgia or other side effects to statin medications.    She eats 2-3 servings of fruits and vegetables daily.She consumes 0 sweetened beverage(s) daily.She exercises with enough effort to increase her heart rate 20 to 29 minutes per day.  She exercises with enough effort to increase her heart rate 3 or less days per week.   She is taking medications regularly.     Fasting     Pt quit smoking around age 45-46, smoked for 20 years for 1-1.5 PPD.     Doing an aerobic class once/week, shoveling.   No SOB, WALTERS, wheezing, coughing, Chest pain.     Has been on statin  "for 10 + years.     Dad - HTN, HLD   Mom - RA  Maternal aunt - osteoporosis.     Uses ambien as needed for travel, rarely uses it .    Lot of vegetables in the summer especially. Tries to eat heart healthy diet.   Not a lot of water.   Drinks 2 coffees/morning.   Glass of wine per day         The 10-year ASCVD risk score (Viktoria HODGE, et al., 2019) is: 3.5%    Values used to calculate the score:      Age: 59 years      Sex: Female      Is Non- : No      Diabetic: No      Tobacco smoker: No      Systolic Blood Pressure: 130 mmHg      Is BP treated: No      HDL Cholesterol: 71 mg/dL      Total Cholesterol: 229 mg/dL              Review of Systems  Constitutional, HEENT, cardiovascular, pulmonary, gi and gu systems are negative, except as otherwise noted.      Objective    /83 (BP Location: Right arm, Patient Position: Sitting, Cuff Size: Adult Regular)   Pulse 85   Temp 97.7  F (36.5  C) (Oral)   Resp 16   Ht 1.753 m (5' 9\")   Wt 76.6 kg (168 lb 12.8 oz)   LMP 07/13/2007   SpO2 98%   BMI 24.93 kg/m    Body mass index is 24.93 kg/m .  Physical Exam   GENERAL: alert and no distress  EYES: Eyes grossly normal to inspection, PERRL and conjunctivae and sclerae normal  HENT: ear canals and TM's normal, nose and mouth without ulcers or lesions  NECK: no adenopathy, no asymmetry, masses, or scars  RESP: lungs clear to auscultation - no rales, rhonchi or wheezes  CV: regular rate and rhythm, normal S1 S2, no S3 or S4, no murmur, click or rub, no peripheral edema  ABDOMEN: soft, nontender, no hepatosplenomegaly, no masses and bowel sounds normal  MS: no gross musculoskeletal defects noted, no edema  SKIN: no suspicious lesions or rashes  NEURO: Normal strength and tone, mentation intact and speech normal  PSYCH: mentation appears normal, affect normal/bright            Signed Electronically by: Vivian Cain, DO  Lung Cancer Screening Shared Decision Making Visit     Isela Cartwright, a 59 " year old female, is eligible for lung cancer screening    History   Smoking Status    Former    Types: Cigarettes   Smokeless Tobacco    Never       I have discussed with patient the risks and benefits of screening for lung cancer with low-dose CT.     The risks include:    radiation exposure: one low dose chest CT has as much ionizing radiation as about 15 chest x-rays, or 6 months of background radiation living in [place]      false positives: most findings/nodules are NOT cancer, but some might still require additional diagnostic evaluation, including biopsy    over-diagnosis: some slow growing cancers that might never have been clinically significant will be detected and treated unnecessarily     The benefit of early detection of lung cancer is contingent upon adherence to annual screening or more frequent follow up if indicated.     Furthermore, to benefit from screening, Isela must be willing and able to undergo diagnostic procedures, if indicated. Although no specific guide is available for determining severity of comorbidities, it is reasonable to withhold screening in patients who have greater mortality risk from other diseases.     We did discuss that the best way to prevent lung cancer is to not smoke.    Some patients may value a numeric estimation of lung cancer risk when evaluating if lung cancer screening is right for them, here is one calculator:    ShouldIScreen

## 2024-12-23 NOTE — PATIENT INSTRUCTIONS
Lung Cancer Screening   Frequently Asked Questions  If you are at high-risk for lung cancer, getting screened with low-dose computed tomography (LDCT) every year can help save your life. This handout offers answers to some of the most common questions about lung cancer screening. If you have other questions, please call [phone] ([phone]).     What is it?  Lung cancer screening uses special X-ray technology to create an image of your lung tissue. The exam is quick and easy and takes less than 10 seconds. We don t give you any medicine or use any needles. You can eat before and after the exam. You don t need to change your clothes as long as the clothing on your chest doesn t contain metal. But, you do need to be able to hold your breath for at least 6 seconds during the exam.    What is the goal of lung cancer screening?  The goal of lung cancer screening is to save lives. Many times, lung cancer is not found until a person starts having physical symptoms. Lung cancer screening can help detect lung cancer in the earliest stages when it may be easier to treat.    Who should be screened for lung cancer?  We suggest lung cancer screening for anyone who is at high-risk for lung cancer. You are in the high-risk group if you:      are between the ages of 55 and 79, and    have smoked at least 1 pack of cigarettes a day for 20 or more years, and    still smoke or have quit within the past 15 years.    However, if you have a new cough or shortness of breath, you should talk to your doctor before being screened.    Why does it matter if I have symptoms?  Certain symptoms can be a sign that you have a condition in your lungs that should be checked and treated by your doctor. These symptoms include fever, chest pain, a new or changing cough, shortness of breath that you have never felt before, coughing up blood or unexplained weight loss. Having any of these symptoms can greatly affect the results of lung  cancer screening.       Should all smokers get an LDCT lung cancer screening exam?  It depends. Lung cancer screening is for a very specific group of men and women who have a history of heavy smoking over a long period of time (see  Who should be screened for lung cancer  above).  I am in the high-risk group, but have been diagnosed with cancer in the past. Is LDCT lung cancer screening right for me?  In some cases, you should not have LDCT lung screening, such as when your doctor is already following your cancer with CT scan studies. Your doctor will help you decide if LDCT lung screening is right for you.  Do I need to have a screening exam every year?  Yes. If you are in the high-risk group described earlier, you should get an LDCT lung cancer screening exam every year until you are 79, or are no longer willing or able to undergo screening and possible procedures to diagnose and treat lung cancer.  How effective is LDCT at preventing death from lung cancer?  Studies have shown that LDCT lung cancer screening can lower the risk of death from lung cancer by 20 percent in people who are at high-risk.  What are the risks?  There are some risks and limitations of LDCT lung cancer screening. We want to make sure you understand the risks and benefits, so please let us know if you have any questions. Your doctor may want to talk with you more about these risks.    Radiation exposure: As with any exam that uses radiation, there is a very small increased risk of cancer. The amount of radiation in LDCT is small--about the same amount a person would get from a mammogram. Your doctor orders the exam when he or she feels the potential benefits outweigh the risks.    False negatives: No test is perfect, including LDCT. It is possible that you may have a medical condition, including lung cancer, that is not found during your exam. This is called a false negative result.    False positives and more testing: LDCT very often finds  something in the lung that could be cancer, but in fact is not. This is called a false positive result. False positive tests often cause anxiety. To make sure these findings are not cancer, you may need to have more tests. These tests will be done only if you give us permission. Sometimes patients need a treatment that can have side effects, such as a biopsy. For more information on false positives, see  What can I expect from the results?     Findings not related to lung cancer: Your LDCT exam also takes pictures of areas of your body next to your lungs. In a very small number of cases, the CT scan will show an abnormal finding in one of these areas, such as your kidneys, adrenal glands, liver or thyroid. This finding may not be serious, but you may need more tests. Your doctor can help you decide what other tests you may need, if any.  What can I expect from the results?  About 1 out of 4 LDCT exams will find something that may need more tests. Most of the time, these findings are lung nodules. Lung nodules are very small collections of tissue in the lung. These nodules are very common, and the vast majority--more than 97 percent--are not cancer (benign). Most are normal lymph nodes or small areas of scarring from past infections.  But, if a small lung nodule is found to be cancer, the cancer can be cured more than 90 percent of the time. To know if the nodule is cancer, we may need to get more images before your next yearly screening exam. If the nodule has suspicious features (for example, it is large, has an odd shape or grows over time), we will refer you to a specialist for further testing.  Will my doctor also get the results?  Yes. Your doctor will get a copy of your results.  Is it okay to keep smoking now that there s a cancer screening exam?  No. Tobacco is one of the strongest cancer-causing agents. It causes not only lung cancer, but other cancers and cardiovascular (heart) diseases as well. The damage  caused by smoking builds over time. This means that the longer you smoke, the higher your risk of disease. While it is never too late to quit, the sooner you quit, the better.  Where can I find help to quit smoking?  The best way to prevent lung cancer is to stop smoking. If you have already quit smoking, congratulations and keep it up! For help on quitting smoking, please call [employer] at 0-735-QUITNOW (1-659.865.8566) or the American [employer] Society at 1-560.837.2922 to find local resources near you.  One-on-one health coaching:  If you d prefer to work individually with a health care provider on tobacco cessation, we offer:      Medication Therapy Management:  Our specially trained pharmacists work closely with you and your doctor to help you quit smoking.  Call [phone] or [phone] (toll free).

## 2024-12-23 NOTE — NURSING NOTE
Prior to immunization administration, verified patients identity using patient s name and date of birth. Please see Immunization Activity for additional information.     Screening Questionnaire for Adult Immunization    Are you sick today?   No   Do you have allergies to medications, food, a vaccine component or latex?   No   Have you ever had a serious reaction after receiving a vaccination?   No   Do you have a long-term health problem with heart, lung, kidney, or metabolic disease (e.g., diabetes), asthma, a blood disorder, no spleen, complement component deficiency, a cochlear implant, or a spinal fluid leak?  Are you on long-term aspirin therapy?   No   Do you have cancer, leukemia, HIV/AIDS, or any other immune system problem?   No   Do you have a parent, brother, or sister with an immune system problem?   No   In the past 3 months, have you taken medications that affect  your immune system, such as prednisone, other steroids, or anticancer drugs; drugs for the treatment of rheumatoid arthritis, Crohn s disease, or psoriasis; or have you had radiation treatments?   No   Have you had a seizure, or a brain or other nervous system problem?   No   During the past year, have you received a transfusion of blood or blood    products, or been given immune (gamma) globulin or antiviral drug?   No   For women: Are you pregnant or is there a chance you could become       pregnant during the next month?   No   Have you received any vaccinations in the past 4 weeks?   No     Immunization questionnaire answers were all negative.      Patient instructed to remain in clinic for 15 minutes afterwards, and to report any adverse reactions.     Screening performed by Kate Hyde MA on 12/23/2024 at 6:59 AM.

## 2025-01-27 ENCOUNTER — ANCILLARY PROCEDURE (OUTPATIENT)
Dept: CT IMAGING | Facility: CLINIC | Age: 60
End: 2025-01-27
Attending: STUDENT IN AN ORGANIZED HEALTH CARE EDUCATION/TRAINING PROGRAM
Payer: COMMERCIAL

## 2025-01-27 DIAGNOSIS — Z87.891 PERSONAL HISTORY OF TOBACCO USE: ICD-10-CM

## 2025-01-27 PROCEDURE — 71271 CT THORAX LUNG CANCER SCR C-: CPT | Performed by: RADIOLOGY

## 2025-01-29 ENCOUNTER — MYC MEDICAL ADVICE (OUTPATIENT)
Dept: FAMILY MEDICINE | Facility: CLINIC | Age: 60
End: 2025-01-29
Payer: COMMERCIAL

## 2025-01-29 DIAGNOSIS — E78.5 HYPERLIPIDEMIA LDL GOAL <130: Primary | ICD-10-CM

## 2025-01-29 RX ORDER — SIMVASTATIN 40 MG
40 TABLET ORAL AT BEDTIME
Qty: 90 TABLET | Refills: 3 | Status: SHIPPED | OUTPATIENT
Start: 2025-01-29

## 2025-01-29 NOTE — TELEPHONE ENCOUNTER
Routing to PCP    Patient had CT on 1/27 and is wondering if she needs to do anything further or any next steps    AC JjN  Oscar Triage RN  [facility]

## 2025-04-28 ENCOUNTER — OFFICE VISIT (OUTPATIENT)
Dept: DERMATOLOGY | Facility: CLINIC | Age: 60
End: 2025-04-28
Attending: PHYSICIAN ASSISTANT
Payer: COMMERCIAL

## 2025-04-28 DIAGNOSIS — L57.0 ACTINIC KERATOSIS: ICD-10-CM

## 2025-04-28 DIAGNOSIS — L81.4 SOLAR LENTIGINOSIS: ICD-10-CM

## 2025-04-28 DIAGNOSIS — Z80.8 FAMILY HISTORY OF MELANOMA: ICD-10-CM

## 2025-04-28 DIAGNOSIS — Z12.83 SKIN CANCER SCREENING: ICD-10-CM

## 2025-04-28 DIAGNOSIS — D18.01 CHERRY ANGIOMA: ICD-10-CM

## 2025-04-28 DIAGNOSIS — L81.4 SOLAR LENTIGO: ICD-10-CM

## 2025-04-28 DIAGNOSIS — D49.2 NEOPLASM OF UNSPECIFIED BEHAVIOR OF BONE, SOFT TISSUE, AND SKIN: Primary | ICD-10-CM

## 2025-04-28 DIAGNOSIS — D23.9 DERMATOFIBROMA: ICD-10-CM

## 2025-04-28 DIAGNOSIS — L82.1 SEBORRHEIC KERATOSES: ICD-10-CM

## 2025-04-28 DIAGNOSIS — L82.0 INFLAMED SEBORRHEIC KERATOSIS: ICD-10-CM

## 2025-04-28 DIAGNOSIS — D22.9 MULTIPLE BENIGN NEVI: ICD-10-CM

## 2025-04-28 PROCEDURE — 88312 SPECIAL STAINS GROUP 1: CPT | Mod: 26 | Performed by: PATHOLOGY

## 2025-04-28 PROCEDURE — 88312 SPECIAL STAINS GROUP 1: CPT | Mod: TC | Performed by: PHYSICIAN ASSISTANT

## 2025-04-28 PROCEDURE — 88305 TISSUE EXAM BY PATHOLOGIST: CPT | Mod: 26 | Performed by: PATHOLOGY

## 2025-04-28 ASSESSMENT — PAIN SCALES - GENERAL: PAINLEVEL_OUTOF10: NO PAIN (0)

## 2025-04-28 NOTE — PATIENT INSTRUCTIONS
Wound Care After a Biopsy    What is a skin biopsy?  A skin biopsy allows the doctor to examine a very small piece of tissue under the microscope to determine the diagnosis and the best treatment for the skin condition. A local anesthetic (numbing medicine) is injected with a very small needle into the skin area to be tested. A small piece of skin is taken from the area. Sometimes a suture (stitch) is used.     What are the risks of a skin biopsy?  I will experience scar, bleeding, swelling, pain, crusting and redness. I may experience incomplete removal or recurrence. Risks of this procedure are excessive bleeding, bruising, infection, nerve damage, numbness, thick (hypertrophic or keloidal) scar and non-diagnostic biopsy.    How should I care for my wound for the first 24 hours?  Keep the wound dry and covered for 24 hours  If it bleeds, hold direct pressure on the area for 15 minutes. If bleeding does not stop, call us or go to the emergency room  Avoid strenuous exercise the first 1-2 days or as your doctor instructs you    How should I care for the wound after 24 hours?  After 24 hours, remove the bandage  You may bathe or shower as normal  If you had a scalp biopsy, you can shampoo as usual and can use shower water to clean the biopsy site daily  Clean the wound once a day with gentle soap and water  Do not scrub, be gentle  Apply white petroleum/Vaseline after cleaning the wound with a cotton swab or a clean finger, and keep the site covered with a Bandaid /bandage. Bandages are not necessary with a scalp biopsy  If you are unable to cover the site with a Bandaid /bandage, re-apply ointment 2-3 times a day to keep the site moist. Moisture will help with healing  Avoid strenuous activity for first 1-2 days  Avoid lakes, rivers, pools, and oceans until the stitches are removed or the site is healed    How do I clean my wound?  Wash hands thoroughly with soap or use hand  before all wound care  Clean  the wound with gentle soap and water  Apply white petroleum/Vaseline  to wound after it is clean  Replace the Bandaid /bandage to keep the wound covered for the first few days or as instructed by your doctor  If you had a scalp biopsy, warm shower water to the area on a daily basis should suffice    What should I use to clean my wound?   Cotton-tipped applicators (Qtips )  White petroleum jelly (Vaseline ). Use a clean new container and use Q-tips to apply.  Bandaids  as needed  Gentle soap     How should I care for my wound long term?  Do not get your wound dirty  Keep up with wound care for one week or until the area is healed.    A small scab will form and fall off by itself when the area is completely healed. The area will be red and will become pink in color as it heals. Sun protection is very important for how your scar will turn out. Sunscreen with an SPF 30 or greater is recommended once the area is healed.  You should have some soreness but it should be mild and slowly go away over several days. Talk to your doctor about using tylenol for pain,    When should I call my doctor?  If you have increased:   Pain or swelling  Pus or drainage (clear or slightly yellow drainage is ok)  Temperature over 100F  Spreading redness or warmth around wound    When will I hear about my results?  The biopsy results can take 2 weeks to come back.  Your results will automatically release to [employer] before your provider has even reviewed them.  The clinic will call you with the results, send you a [employer] message, or have you schedule a follow-up clinic or phone time to discuss the results.  Contact our clinics if you do not hear from us in 2 weeks.    Who should I call with questions?  [facility]: [phone]  [facility]: [phone]  For urgent needs outside of business hours call the [facility] at [phone] and ask for the dermatology resident on  call     Cryotherapy    What is it?  Use of a very cold liquid, such as liquid nitrogen, to freeze and destroy abnormal skin cells that need to be removed    What should I expect?  Tenderness and redness  A small blister that might grow and fill with dark purple blood. There may be crusting.  More than one treatment may be needed if the lesions do not go away.    How do I care for the treated area?  Gently wash the area with your hands when bathing.  Use a thin layer of Vaseline to help with healing. You may use a Band-Aid.   The area should heal within 7-10 days and may leave behind a pink or lighter color.   Do not use an antibiotic or Neosporin ointment.   You may take acetaminophen (Tylenol) for pain.     Call your doctor if you have:  Severe pain  Signs of infection (warmth, redness, cloudy yellow drainage, and or a bad smell)  Questions or concerns    Who should I call with questions?      [facility]: [phone]      [facility]: [phone]      For urgent needs outside of business hours call the [facility] at [phone] and ask for the dermatology resident on call       Checking for Skin Cancer  You can help find cancer early by checking your skin each month. There are 3 main kinds of skin cancer: melanoma, basal cell carcinoma, and squamous cell carcinoma. Doing monthly skin checks is the best way to find new marks, sores, or skin changes. Follow these instructions for checking your skin.   The ABCDEs of checking moles for melanoma   Check your moles or growths for signs of melanoma using ABCDE:   Asymmetry: The sides of the mole or growth don t match.  Border: The edges are ragged, notched, or blurred.  Color: The color within the mole or growth varies. It could be black, brown, tan, white, or shades of red, gray, or blue.  Diameter: The mole or growth is larger than   inch or 6 mm (size of a pencil eraser).  Evolving: The size,  shape, texture, or color of the mole or growth is changing.     ABCDE's of moles on light skin.        ABCDE's of moles on dark skin may be harder to identify.     Checking for other types of skin cancer  Basal cell carcinoma or squamous cell carcinoma cause symptoms like:     A spot or mole that looks different from all other marks on your skin  Changes in how an area feels, such as itching, tenderness, or pain  Changes in the skin's surface, such as oozing, bleeding, or scaliness  A sore that doesn't heal  New swelling, redness, or spread of color beyond the border of a mole    Who s at risk?  Anyone of any skin color can get skin cancer. But you're at greater risk if you have:   Fair skin that freckles easily and burns instead of tanning  Light-colored or red hair  Light-colored eyes  Many moles or abnormal moles on your skin  A long history of unprotected exposure to sunlight or tanning beds  A history of many blistering sunburns as a child or teen  A family history of skin cancer  Been exposed to radiation or chemicals  A weakened immune system  Been exposed to arsenic  If you've had skin cancer in the past, you're at high risk of having it again.   How to check your skin  Do your monthly skin checkups in front of a full-length mirror. Use a room with good lighting so it's easier to see. Use a hand mirror to look at hard-to-see places like your buttocks and back. You can also have a trusted friend or family member help you with these checks. Check every part of your body, including your:   Head (ears, face, neck, and scalp)  Torso (front, back, sides, and under breasts)  Arms (tops, undersides, and armpits)  Hands (palms, backs, and fingers, including under the nails)  Lower back, buttocks, and genitals  Legs (front, back, and sides)  Feet (tops, soles, toes, including under the nails, and between toes)  Watch for new spots on your skin or a spot that's changing in color, shape, size.   If you have a lot of  moles, take digital photos of them each month. Make sure to take photos both up close and from a distance. These can help you see if any moles change over time.   Know your skin  Most skin changes aren't cancer. But if you see any changes in your skin, call your healthcare provider right away. Only they can tell you if a change is a problem. If you have skin cancer, seeing your provider can be the first step to getting the treatment that could save your life.   [employer] last reviewed this educational content on 10/1/2021    3324-1124 [employer]. All rights reserved. This information is not intended as a substitute for professional medical care. Always follow your healthcare professional's instructions.

## 2025-04-28 NOTE — PROGRESS NOTES
Lidocaine-epinephrine 1-1:453608 % injection   0.2mL once for one use, starting 4/28/2025 ending 4/28/2025,  2mL disp, R-0, injection  Injected by Claudia Cole CA

## 2025-04-28 NOTE — LETTER
4/28/2025       RE: Isela Cartwright  [address]  [facility] [address]     Dear Colleague,    Thank you for referring your patient, Isela Cartwright, to the [facility] [place] [facility]. Please see a copy of my visit note below.      [facility] Dermatology Note  Encounter Date: Apr 28, 2025  Office Visit     Dermatology Problem List:  Last skin check 4/28/25  0. NUB, R lateral thigh, s/p bx 4/28/25  1. Acne Vulgaris   - previous tx: tretinoin 0.025% cream   2. Family history of melanoma and NMSC (mother)  3. Solar lentigines  - Hydroquinone 4% cream   4. AKs s/p cryo   5. ISKs, s/p cryo     LTM: R upper abdomen, 7 mm brown macule    ____________________________________________    Assessment & Plan:    # Neoplasm of unspecified behavior of the skin (D49.2) on the R lateral thigh. The differential diagnosis includes amelanotic melanoma vs clear cell acanthoma vs other. .   - Shave biopsy performed today. See procedure section.    # Lesion to monitor, right upper abdomen (7 mm brown macule)  - Stable today. Continue to clinically monitor    # Actinic keratosis, R upper forehead  - Cryotherapy performed today. See procedure section.    # Seborrheic keratosis, inflamed, central forehead  - Cryotherapy performed today. See procedure section.     # Acne vulgaris, will monitor clinically  - discontinue tretinoin 0.025% cream.    # Family history of Melanoma and NMSC (mother)  - Recommend yearly skin exams.     # Skin cancer screening with multiple benign nevi, solar lentigines  - ABCDEs: Counseled ABCDEs of melanoma: Asymmetry, Border (irregularity), Color (not uniform, changes in color), Diameter (greater than 6 mm which is about the size of a pencil eraser), and Evolving (any changes in preexisting moles).  - Sun protection: Counseled SPF30+ sunscreen, UPF clothing, sun avoidance, tanning bed avoidance.  - continue  Hydroquinone 4% cream     # Cherry angiomas and seborrheic keratoses, dermatofibromas  Benign, reassurance given.     Procedures Performed:   - Cryotherapy procedure note, location(s): R upper forehead, central forehead. After verbal consent and discussion of risks and benefits including, but not limited to, dyspigmentation/scar, blister, and pain, 2 lesion(s) was(were) treated with 1-2 mm freeze border for 1-2 cycles with liquid nitrogen. Post cryotherapy instructions were provided.  - Shave biopsy procedure note, location(s): R lateral thigh. After discussion of benefits and risks including but not limited to bleeding, infection, scar, incomplete removal, recurrence, and non-diagnostic biopsy, verbal consent and photographs were obtained. The area was cleaned with isopropyl alcohol. 0.5mL of 1% lidocaine with epinephrine was injected to obtain adequate anesthesia of lesion(s). Shave biopsy at site(s) performed. Hemostasis was achieved with aluminium chloride. Petrolatum ointment and a sterile dressing were applied. The patient tolerated the procedure and no complications were noted. The patient was provided with verbal and written post care instructions.     Follow-up: 1 year(s) in-person, or earlier for new or changing lesions    Staff and Scribe:     Scribe Disclosure:   ESTEFANIA, Kelsea Ceballos, am serving as a scribe to document services personally performed by Nidhi Trevino PA-C based on data collection and the provider's statements to me.     Provider Disclosure:   The documentation recorded by the scribe accurately reflects the services I personally performed and the decisions made by me.    All risks, benefits and alternatives were discussed with patient.  Patient is in agreement and understands the assessment and plan.  All questions were answered.  Sun Screen Education was given.   Return to Clinic annually or sooner as needed.   Nidhi Tervino PA-C   [facility] Dermat[facility]     ____________________________________________    CC: Skin Check (FBSE - pt reports spot on forehead that is very irritating)    HPI:  Ms. Isela Cartwright is a(n) 59 year old female who presents today as a return patient for skin check. Last seen in dermatology 4/29/24 by me for skin check.    Today, patient reports very irritating spot on the forehead.       Patient is otherwise feeling well, without additional skin concerns.    Labs Reviewed:  N/A    Physical Exam:  Vitals: LMP 07/13/2007   SKIN: Total skin excluding the undergarment areas was performed. The exam included the head/face, neck, both arms, chest, back, abdomen, both legs, digits and/or nails.   - 7 mm brown macule on the R upper abdomen  - 5 mm pink macule on the R lateral thigh.   - There are dome shaped bright red papules on the trunk and extremities .   - Multiple regular brown pigmented macules and papules are identified on the trunk and extremities. .   - Scattered brown macules on sun exposed areas.  - Waxy stuck on papules and plaques on trunk and extremities.    - There is an erythematous macule with overyling adherent scale on the R upper forehead..   - There is a tan to brown waxy stuck on papule with surrounding erythema on the central forehead. .   - No other lesions of concern on areas examined.     Medications:  Current Outpatient Medications   Medication Sig Dispense Refill     Calcium Carbonate-Vitamin D (CALCIUM 500 + D PO) Take 1 tablet by mouth daily.       simvastatin (ZOCOR) 40 MG tablet Take 1 tablet (40 mg) by mouth at bedtime. 90 tablet 3     zolpidem (AMBIEN) 10 MG tablet Take 1 tablet (10 mg) by mouth nightly as needed for sleep ++NEEDS APPOINTMENT++ 30 tablet 0     No current facility-administered medications for this visit.      Past Medical History:   Patient Active Problem List   Diagnosis     Insomnia     Recurrent UTI     Hyperlipidemia LDL goal <130     Vaginal atrophy     Weight gain     Pain of finger of left hand      Plantar fasciitis     Tubular adenoma of colon     Routine general medical examination at a health care facility     Past Medical History:   Diagnosis Date     Recurrent UTI 11/19/2009     Tobacco use disorder     quit in 2008        CC Nidhi Trevino PA-C  [address] [place] [phone] on close of this encounter.      Lidocaine-epinephrine 1-1:466042 % injection   0.2mL once for one use, starting 4/28/2025 ending 4/28/2025,  2mL disp, R-0, injection  Injected by WINSTON Bey      Again, thank you for allowing me to participate in the care of your patient.      Sincerely,    Nidhi Trevino PA-C

## 2025-04-28 NOTE — PROGRESS NOTES
[facility] Dermatology Note  Encounter Date: Apr 28, 2025  Office Visit     Dermatology Problem List:  Last skin check 4/28/25  0. NUB, R lateral thigh, s/p bx 4/28/25  1. Acne Vulgaris   - previous tx: tretinoin 0.025% cream   2. Family history of melanoma and NMSC (mother)  3. Solar lentigines  - Hydroquinone 4% cream   4. AKs s/p cryo   5. ISKs, s/p cryo     LTM: R upper abdomen, 7 mm brown macule    ____________________________________________    Assessment & Plan:    # Neoplasm of unspecified behavior of the skin (D49.2) on the R lateral thigh. The differential diagnosis includes amelanotic melanoma vs clear cell acanthoma vs other. .   - Shave biopsy performed today. See procedure section.    # Lesion to monitor, right upper abdomen (7 mm brown macule)  - Stable today. Continue to clinically monitor    # Actinic keratosis, R upper forehead  - Cryotherapy performed today. See procedure section.    # Seborrheic keratosis, inflamed, central forehead  - Cryotherapy performed today. See procedure section.      # Family history of Melanoma and NMSC (mother)  - Recommend yearly skin exams.     # Skin cancer screening with multiple benign nevi, solar lentigines  - ABCDEs: Counseled ABCDEs of melanoma: Asymmetry, Border (irregularity), Color (not uniform, changes in color), Diameter (greater than 6 mm which is about the size of a pencil eraser), and Evolving (any changes in preexisting moles).  - Sun protection: Counseled SPF30+ sunscreen, UPF clothing, sun avoidance, tanning bed avoidance.  - continue Hydroquinone 4% cream     # Cherry angiomas and seborrheic keratoses, dermatofibromas  Benign, reassurance given.     Procedures Performed:   - Cryotherapy procedure note, location(s): R upper forehead, central forehead. After verbal consent and discussion of risks and benefits including, but not limited to, dyspigmentation/scar, blister, and pain, 2 lesion(s) was(were) treated with 1-2 mm freeze  border for 1-2 cycles with liquid nitrogen. Post cryotherapy instructions were provided.  - Shave biopsy procedure note, location(s): R lateral thigh. After discussion of benefits and risks including but not limited to bleeding, infection, scar, incomplete removal, recurrence, and non-diagnostic biopsy, verbal consent and photographs were obtained. The area was cleaned with isopropyl alcohol. 0.5mL of 1% lidocaine with epinephrine was injected to obtain adequate anesthesia of lesion(s). Shave biopsy at site(s) performed. Hemostasis was achieved with aluminium chloride. Petrolatum ointment and a sterile dressing were applied. The patient tolerated the procedure and no complications were noted. The patient was provided with verbal and written post care instructions.     Follow-up: 1 year(s) in-person, or earlier for new or changing lesions    Staff and Scribe:     Scribe Disclosure:   Kelsea MAC, am serving as a scribe to document services personally performed by Nidhi Trevino PA-C based on data collection and the provider's statements to me.     Provider Disclosure:   The documentation recorded by the scribe accurately reflects the services I personally performed and the decisions made by me.    All risks, benefits and alternatives were discussed with patient.  Patient is in agreement and understands the assessment and plan.  All questions were answered.  Sun Screen Education was given.   Return to Clinic annually or sooner as needed.   Nidhi Trevino PA-C   [facility]    ____________________________________________    CC: Skin Check (FBSE - pt reports spot on forehead that is very irritating)    HPI:  Ms. Isela Cartwright is a(n) 59 year old female who presents today as a return patient for skin check. Last seen in dermatology 4/29/24 by me for skin check.    Today, patient reports very irritating spot on the forehead.       Patient is otherwise feeling well, without  additional skin concerns.    Labs Reviewed:  N/A    Physical Exam:  Vitals: LMP 07/13/2007   SKIN: Total skin excluding the undergarment areas was performed. The exam included the head/face, neck, both arms, chest, back, abdomen, both legs, digits and/or nails.   - 7 mm brown macule on the R upper abdomen  - 5 mm pink macule on the R lateral thigh.   - There are dome shaped bright red papules on the trunk and extremities .   - Multiple regular brown pigmented macules and papules are identified on the trunk and extremities. .   - Scattered brown macules on sun exposed areas.  - Waxy stuck on papules and plaques on trunk and extremities.    - There is an erythematous macule with overyling adherent scale on the R upper forehead..   - There is a tan to brown waxy stuck on papule with surrounding erythema on the central forehead. .   - No other lesions of concern on areas examined.     Medications:  Current Outpatient Medications   Medication Sig Dispense Refill    Calcium Carbonate-Vitamin D (CALCIUM 500 + D PO) Take 1 tablet by mouth daily.      simvastatin (ZOCOR) 40 MG tablet Take 1 tablet (40 mg) by mouth at bedtime. 90 tablet 3    zolpidem (AMBIEN) 10 MG tablet Take 1 tablet (10 mg) by mouth nightly as needed for sleep ++NEEDS APPOINTMENT++ 30 tablet 0     No current facility-administered medications for this visit.      Past Medical History:   Patient Active Problem List   Diagnosis    Insomnia    Recurrent UTI    Hyperlipidemia LDL goal <130    Vaginal atrophy    Weight gain    Pain of finger of left hand    Plantar fasciitis    Tubular adenoma of colon    Routine general medical examination at a health care facility     Past Medical History:   Diagnosis Date    Recurrent UTI 11/19/2009    Tobacco use disorder     quit in 2008        CC Nidhi Trevino PA-C  [address] [place] [phone] on close of this encounter.

## 2025-04-28 NOTE — NURSING NOTE
Dermatology Rooming Note    Isela Cartwright's goals for this visit include:   Chief Complaint   Patient presents with    Skin Check     FBSE - pt reports spot on forehead that is very irritating     Claudia Cole CA

## 2025-04-30 LAB
PATH REPORT.COMMENTS IMP SPEC: NORMAL
PATH REPORT.FINAL DX SPEC: NORMAL
PATH REPORT.GROSS SPEC: NORMAL
PATH REPORT.MICROSCOPIC SPEC OTHER STN: NORMAL
PATH REPORT.RELEVANT HX SPEC: NORMAL

## 2025-07-23 ENCOUNTER — PATIENT OUTREACH (OUTPATIENT)
Dept: CARE COORDINATION | Facility: CLINIC | Age: 60
End: 2025-07-23
Payer: COMMERCIAL

## 2025-08-18 ENCOUNTER — ANCILLARY PROCEDURE (OUTPATIENT)
Dept: MAMMOGRAPHY | Facility: CLINIC | Age: 60
End: 2025-08-18
Attending: STUDENT IN AN ORGANIZED HEALTH CARE EDUCATION/TRAINING PROGRAM
Payer: COMMERCIAL

## 2025-08-18 DIAGNOSIS — Z12.31 VISIT FOR SCREENING MAMMOGRAM: ICD-10-CM

## 2025-08-18 PROCEDURE — 77063 BREAST TOMOSYNTHESIS BI: CPT | Mod: GC

## 2025-08-18 PROCEDURE — 77067 SCR MAMMO BI INCL CAD: CPT | Mod: GC

## (undated) DEVICE — KIT ENDO FIRST STEP DISINFECTANT 200ML W/POUCH EP-4

## (undated) DEVICE — PAD CHUX UNDERPAD 23X24" 7136

## (undated) DEVICE — PREP CHLORAPREP 26ML TINTED ORANGE  260815

## (undated) RX ORDER — FENTANYL CITRATE 50 UG/ML
INJECTION, SOLUTION INTRAMUSCULAR; INTRAVENOUS
Status: DISPENSED
Start: 2024-05-20